# Patient Record
Sex: FEMALE | Race: WHITE | Employment: OTHER
[De-identification: names, ages, dates, MRNs, and addresses within clinical notes are randomized per-mention and may not be internally consistent; named-entity substitution may affect disease eponyms.]

---

## 2017-01-19 RX ORDER — ATORVASTATIN CALCIUM 10 MG/1
TABLET, FILM COATED ORAL
Qty: 90 TABLET | Refills: 1 | Status: SHIPPED | OUTPATIENT
Start: 2017-01-19 | End: 2017-07-18 | Stop reason: SDUPTHER

## 2017-02-01 ENCOUNTER — TELEPHONE (OUTPATIENT)
Dept: CASE MANAGEMENT | Age: 62
End: 2017-02-01

## 2017-03-01 ENCOUNTER — OFFICE VISIT (OUTPATIENT)
Dept: GYNECOLOGIC ONCOLOGY | Facility: CLINIC | Age: 62
End: 2017-03-01

## 2017-03-01 ENCOUNTER — TELEPHONE (OUTPATIENT)
Dept: CASE MANAGEMENT | Age: 62
End: 2017-03-01

## 2017-03-01 VITALS
WEIGHT: 166.2 LBS | HEART RATE: 85 BPM | SYSTOLIC BLOOD PRESSURE: 102 MMHG | OXYGEN SATURATION: 98 % | BODY MASS INDEX: 32.63 KG/M2 | DIASTOLIC BLOOD PRESSURE: 66 MMHG | TEMPERATURE: 97.8 F | HEIGHT: 60 IN | RESPIRATION RATE: 16 BRPM

## 2017-03-01 DIAGNOSIS — C54.1 ENDOMETRIAL CANCER (HCC): Primary | ICD-10-CM

## 2017-03-01 DIAGNOSIS — R91.1 PULMONARY NODULE SEEN ON IMAGING STUDY: ICD-10-CM

## 2017-03-01 PROCEDURE — 3014F SCREEN MAMMO DOC REV: CPT | Performed by: NURSE PRACTITIONER

## 2017-03-01 PROCEDURE — G8484 FLU IMMUNIZE NO ADMIN: HCPCS | Performed by: NURSE PRACTITIONER

## 2017-03-01 PROCEDURE — 1036F TOBACCO NON-USER: CPT | Performed by: NURSE PRACTITIONER

## 2017-03-01 PROCEDURE — G8427 DOCREV CUR MEDS BY ELIG CLIN: HCPCS | Performed by: NURSE PRACTITIONER

## 2017-03-01 PROCEDURE — 3017F COLORECTAL CA SCREEN DOC REV: CPT | Performed by: NURSE PRACTITIONER

## 2017-03-01 PROCEDURE — G8417 CALC BMI ABV UP PARAM F/U: HCPCS | Performed by: NURSE PRACTITIONER

## 2017-03-01 PROCEDURE — 99213 OFFICE O/P EST LOW 20 MIN: CPT | Performed by: NURSE PRACTITIONER

## 2017-03-01 ASSESSMENT — ENCOUNTER SYMPTOMS
CONSTIPATION: 0
ABDOMINAL PAIN: 0
ANAL BLEEDING: 0
COUGH: 0
BLOOD IN STOOL: 0
TROUBLE SWALLOWING: 0
ABDOMINAL DISTENTION: 0
SHORTNESS OF BREATH: 0

## 2017-04-03 ENCOUNTER — TELEPHONE (OUTPATIENT)
Dept: CASE MANAGEMENT | Age: 62
End: 2017-04-03

## 2017-04-18 ENCOUNTER — HOSPITAL ENCOUNTER (OUTPATIENT)
Dept: ULTRASOUND IMAGING | Age: 62
Discharge: HOME | End: 2017-04-18
Payer: COMMERCIAL

## 2017-04-18 DIAGNOSIS — D51.3 OTHER DIETARY VITAMIN B12 DEFICIENCY ANEMIA: ICD-10-CM

## 2017-04-18 DIAGNOSIS — Z79.899 POLYPHARMACY: ICD-10-CM

## 2017-04-18 DIAGNOSIS — R97.8 ABNORMAL TUMOR MARKERS: ICD-10-CM

## 2017-04-18 DIAGNOSIS — K90.89: ICD-10-CM

## 2017-04-18 DIAGNOSIS — C38.0: ICD-10-CM

## 2017-04-18 PROCEDURE — 76536 US EXAM OF HEAD AND NECK: CPT

## 2017-05-05 ENCOUNTER — HOSPITAL ENCOUNTER (OUTPATIENT)
Dept: ULTRASOUND IMAGING | Age: 62
Discharge: HOME | End: 2017-05-05
Payer: COMMERCIAL

## 2017-05-05 VITALS
HEART RATE: 78 BPM | RESPIRATION RATE: 13 BRPM | DIASTOLIC BLOOD PRESSURE: 81 MMHG | OXYGEN SATURATION: 96 % | SYSTOLIC BLOOD PRESSURE: 138 MMHG

## 2017-05-05 DIAGNOSIS — E04.1 THYROID NODULE: ICD-10-CM

## 2017-05-05 PROCEDURE — 88172 CYTP DX EVAL FNA 1ST EA SITE: CPT

## 2017-05-05 PROCEDURE — 88173 CYTOPATH EVAL FNA REPORT: CPT

## 2017-05-05 PROCEDURE — 60100 BIOPSY OF THYROID: CPT

## 2017-05-05 PROCEDURE — 88305 TISSUE EXAM BY PATHOLOGIST: CPT

## 2017-05-05 PROCEDURE — 88177 CYTP FNA EVAL EA ADDL: CPT

## 2017-05-08 LAB — SURGICAL PATHOLOGY REPORT: NORMAL

## 2017-06-08 ENCOUNTER — HOSPITAL ENCOUNTER (OUTPATIENT)
Age: 62
Setting detail: SPECIMEN
Discharge: HOME | End: 2017-06-08
Payer: COMMERCIAL

## 2017-06-08 ENCOUNTER — OFFICE VISIT (OUTPATIENT)
Dept: GYNECOLOGIC ONCOLOGY | Age: 62
End: 2017-06-08
Payer: COMMERCIAL

## 2017-06-08 VITALS
DIASTOLIC BLOOD PRESSURE: 84 MMHG | BODY MASS INDEX: 33.42 KG/M2 | HEIGHT: 61 IN | OXYGEN SATURATION: 97 % | WEIGHT: 177 LBS | TEMPERATURE: 98 F | SYSTOLIC BLOOD PRESSURE: 119 MMHG | RESPIRATION RATE: 18 BRPM | HEART RATE: 81 BPM

## 2017-06-08 DIAGNOSIS — Z12.31 ENCOUNTER FOR SCREENING MAMMOGRAM FOR BREAST CANCER: ICD-10-CM

## 2017-06-08 DIAGNOSIS — Z85.42 HISTORY OF CANCER OF UTERINE BODY: Primary | ICD-10-CM

## 2017-06-08 PROCEDURE — 3014F SCREEN MAMMO DOC REV: CPT | Performed by: OBSTETRICS & GYNECOLOGY

## 2017-06-08 PROCEDURE — 3017F COLORECTAL CA SCREEN DOC REV: CPT | Performed by: OBSTETRICS & GYNECOLOGY

## 2017-06-08 PROCEDURE — 1036F TOBACCO NON-USER: CPT | Performed by: OBSTETRICS & GYNECOLOGY

## 2017-06-08 PROCEDURE — G8417 CALC BMI ABV UP PARAM F/U: HCPCS | Performed by: OBSTETRICS & GYNECOLOGY

## 2017-06-08 PROCEDURE — 99213 OFFICE O/P EST LOW 20 MIN: CPT | Performed by: OBSTETRICS & GYNECOLOGY

## 2017-06-08 PROCEDURE — 57100 BIOPSY VAGINAL MUCOSA SIMPLE: CPT | Performed by: OBSTETRICS & GYNECOLOGY

## 2017-06-08 PROCEDURE — G8427 DOCREV CUR MEDS BY ELIG CLIN: HCPCS | Performed by: OBSTETRICS & GYNECOLOGY

## 2017-06-08 ASSESSMENT — ENCOUNTER SYMPTOMS
GASTROINTESTINAL NEGATIVE: 1
ABDOMINAL PAIN: 0
EYES NEGATIVE: 1
BLOOD IN STOOL: 0
ABDOMINAL DISTENTION: 0
SHORTNESS OF BREATH: 0
RESPIRATORY NEGATIVE: 1
ALLERGIC/IMMUNOLOGIC NEGATIVE: 1

## 2017-06-12 ENCOUNTER — TELEPHONE (OUTPATIENT)
Dept: GYNECOLOGIC ONCOLOGY | Age: 62
End: 2017-06-12

## 2017-06-12 LAB — SURGICAL PATHOLOGY REPORT: NORMAL

## 2017-07-11 ENCOUNTER — HOSPITAL ENCOUNTER (OUTPATIENT)
Dept: MAMMOGRAPHY | Age: 62
Discharge: HOME | End: 2017-07-11
Payer: COMMERCIAL

## 2017-07-11 DIAGNOSIS — Z85.42 HISTORY OF CANCER OF UTERINE BODY: ICD-10-CM

## 2017-07-11 DIAGNOSIS — Z12.31 ENCOUNTER FOR SCREENING MAMMOGRAM FOR BREAST CANCER: ICD-10-CM

## 2017-07-11 PROCEDURE — 77063 BREAST TOMOSYNTHESIS BI: CPT

## 2017-07-18 RX ORDER — ATORVASTATIN CALCIUM 10 MG/1
TABLET, FILM COATED ORAL
Qty: 90 TABLET | Refills: 0 | Status: SHIPPED | OUTPATIENT
Start: 2017-07-18 | End: 2017-10-17 | Stop reason: SDUPTHER

## 2017-10-02 RX ORDER — RANITIDINE 150 MG/1
TABLET ORAL
Qty: 180 TABLET | Refills: 1 | Status: SHIPPED | OUTPATIENT
Start: 2017-10-02 | End: 2018-03-06 | Stop reason: SDUPTHER

## 2017-10-02 RX ORDER — IBUPROFEN 800 MG/1
TABLET ORAL
Qty: 270 TABLET | Refills: 1 | Status: SHIPPED | OUTPATIENT
Start: 2017-10-02 | End: 2018-03-06 | Stop reason: SDUPTHER

## 2017-10-17 RX ORDER — ATORVASTATIN CALCIUM 10 MG/1
10 TABLET, FILM COATED ORAL DAILY
Qty: 90 TABLET | Refills: 3 | Status: SHIPPED | OUTPATIENT
Start: 2017-10-17 | End: 2018-03-06 | Stop reason: SDUPTHER

## 2017-11-10 ENCOUNTER — OFFICE VISIT (OUTPATIENT)
Dept: GYNECOLOGIC ONCOLOGY | Age: 62
End: 2017-11-10
Payer: COMMERCIAL

## 2017-11-10 VITALS
HEIGHT: 61 IN | BODY MASS INDEX: 35.61 KG/M2 | OXYGEN SATURATION: 96 % | SYSTOLIC BLOOD PRESSURE: 135 MMHG | HEART RATE: 79 BPM | DIASTOLIC BLOOD PRESSURE: 83 MMHG | WEIGHT: 188.6 LBS | TEMPERATURE: 97.3 F

## 2017-11-10 DIAGNOSIS — Z90.722 STATUS POST TOTAL HYSTERECTOMY AND BILATERAL SALPINGO-OOPHORECTOMY: ICD-10-CM

## 2017-11-10 DIAGNOSIS — Z08 ENCOUNTER FOR ROUTINE CANCER FOLLOW-UP: Primary | ICD-10-CM

## 2017-11-10 DIAGNOSIS — Z85.42 HISTORY OF CANCER OF UTERINE BODY: ICD-10-CM

## 2017-11-10 DIAGNOSIS — Z90.710 STATUS POST TOTAL HYSTERECTOMY AND BILATERAL SALPINGO-OOPHORECTOMY: ICD-10-CM

## 2017-11-10 DIAGNOSIS — R91.1 PULMONARY NODULE SEEN ON IMAGING STUDY: ICD-10-CM

## 2017-11-10 DIAGNOSIS — Z13.89 SCREENING FOR GENITOURINARY CONDITION: ICD-10-CM

## 2017-11-10 DIAGNOSIS — Z90.79 STATUS POST TOTAL HYSTERECTOMY AND BILATERAL SALPINGO-OOPHORECTOMY: ICD-10-CM

## 2017-11-10 PROCEDURE — 99213 OFFICE O/P EST LOW 20 MIN: CPT | Performed by: NURSE PRACTITIONER

## 2017-11-10 PROCEDURE — 3014F SCREEN MAMMO DOC REV: CPT | Performed by: NURSE PRACTITIONER

## 2017-11-10 PROCEDURE — G8427 DOCREV CUR MEDS BY ELIG CLIN: HCPCS | Performed by: NURSE PRACTITIONER

## 2017-11-10 PROCEDURE — 1036F TOBACCO NON-USER: CPT | Performed by: NURSE PRACTITIONER

## 2017-11-10 PROCEDURE — G8484 FLU IMMUNIZE NO ADMIN: HCPCS | Performed by: NURSE PRACTITIONER

## 2017-11-10 PROCEDURE — G8417 CALC BMI ABV UP PARAM F/U: HCPCS | Performed by: NURSE PRACTITIONER

## 2017-11-10 PROCEDURE — 3017F COLORECTAL CA SCREEN DOC REV: CPT | Performed by: NURSE PRACTITIONER

## 2017-11-10 ASSESSMENT — ENCOUNTER SYMPTOMS
CONSTIPATION: 0
SHORTNESS OF BREATH: 0
ABDOMINAL PAIN: 0
ABDOMINAL DISTENTION: 0
ANAL BLEEDING: 0
BLOOD IN STOOL: 0
TROUBLE SWALLOWING: 0
COUGH: 0

## 2017-11-10 NOTE — PROGRESS NOTES
Cheli Jarquin is a 57 y.o. female that presents today for:  No chief complaint on file. HPI:  HPI  57 y.o.   2 Para 2 woman, seen initially 10/6/16 at the request of Dr. Guero Brown for endometrial cancer. The patient developed postmenopausal bleeding which had persisted for 2 months. It started as a very light, sporadic blood tinged discharge that she noted when wiping after urination, ultimately she developed heavier bleeding. A pelvic ultrasound was obtained on 2016. The uterus measured 7.8 x 3.2 x 5.3 cm but the endometrial stripe was thickened at 2.5 cm. The ovaries were normal and there was no significant free fluid within the pelvis. An endometrial biopsy was obtained on 2016. This showed a high-grade mixed cell carcinoma comprised of 60% serous carcinoma and 40% clear cell carcinoma. A CT of the chest abdomen and pelvis was obtained on 2016. There was no evidence of metastatic disease in the chest, abdomen or pelvis. There was a nonspecific less than 4 mm left lower lobe pulmonary nodule and surveillance was recommended. She was counseled to undergo surgical staging.   On 10/19/16 she underwent a total laparoscopy hysterectomy, bilateral salpingo-oophorectomy,pelvic and para-aortic lymphadenectomy,omental biopsy, appendectomy, small bowel resection and extensive lysis of adhesions. 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      The patient had consultation with medical oncology and has completed six cycles of adjuvant chemotherapy. She  declined vaginal cuff brachytherapy. She has completed her adjuvant chemotherapy and underwent a CT of the chest, abdomen and pelvis on 2017.   Pulmonary and thyroid nodules were stable and there was no evidence of recurrent or persistent endometrial cancer.     An ultrasound guided biopsy of the thyroid nodule was children: N/A    Years of education: N/A     Social History Main Topics    Smoking status: Former Smoker     Types: Cigarettes     Quit date: 1/1/1991    Smokeless tobacco: Never Used    Alcohol use Yes      Comment: rare    Drug use: No    Sexual activity: Not on file     Other Topics Concern    Not on file     Social History Narrative    No narrative on file       Past [address] does contribute to today's presenting complaint. Current Outpatient Prescriptions   Medication Sig Dispense Refill    atorvastatin (LIPITOR) 10 MG tablet Take 1 tablet by mouth daily 90 tablet 3    ibuprofen (ADVIL;MOTRIN) 800 MG tablet TAKE 1 TABLET EVERY 8 HOURS AS NEEDED FOR PAIN 270 tablet 1    ranitidine (ZANTAC) 150 MG tablet TAKE 1 TABLET TWICE A  tablet 1     No current facility-administered medications for this visit. No Known Allergies    There were no vitals filed for this visit. Physical Examination:  Physical Exam   Constitutional: She is oriented to person, place, and time. She appears well-developed. No distress. HENT:   Head: Normocephalic and atraumatic. Cardiovascular: Normal rate and regular rhythm. No murmur heard. Pulmonary/Chest: Effort normal and breath sounds normal. She has no wheezes. Abdominal: Soft. Bowel sounds are normal. She exhibits no distension. There is no tenderness. Genitourinary:   Genitourinary Comments: Ovaries, tubes, uterus, cervix surgically removed, no palpable vaginal nodules, pelvic masses or tenderness, vaginal cuff is intact without erythema, induration, separation or granulation tissue, rectal-vaginal exam is unremarkable, the pt has normal female genitalia including vulva, urethra,vagina, Bartholin's and Goss's glands   Musculoskeletal: She exhibits no edema or deformity. Neurological: She is alert and oriented to person, place, and time. Skin: Skin is warm and dry. No rash noted. No erythema. Psychiatric: She has a normal mood and affect.  Her behavior is normal.         Assessment:     1. Encounter for routine cancer follow-up     2. History of cancer of uterine body     3. Pulmonary nodule seen on imaging study     4. Status post total hysterectomy and bilateral salpingo-oophorectomy            Plan:    No evidence of recurrent or persistent endometrial cancer found on exam, pt to continue with routine surveillance follow ups every 3-4 months. Repeat low dose Ct of the chest to monitor lung nodule before next follow up. The pt denies any unanswered questions and voices understanding and agreement with Plan of Care. Return in about 3 months (around 2/10/2018) for routine surveillance. No orders of the defined types were placed in this encounter. No orders of the defined types were placed in this encounter.          Electronically signed by Ki Haynes CNP on 11/10/2017 at 1:04 PM

## 2018-03-06 ENCOUNTER — OFFICE VISIT (OUTPATIENT)
Dept: FAMILY MEDICINE CLINIC | Age: 63
End: 2018-03-06
Payer: MEDICARE

## 2018-03-06 VITALS
HEIGHT: 61 IN | WEIGHT: 182 LBS | HEART RATE: 76 BPM | BODY MASS INDEX: 34.36 KG/M2 | SYSTOLIC BLOOD PRESSURE: 132 MMHG | DIASTOLIC BLOOD PRESSURE: 70 MMHG

## 2018-03-06 DIAGNOSIS — E78.00 PURE HYPERCHOLESTEROLEMIA: ICD-10-CM

## 2018-03-06 DIAGNOSIS — I10 ESSENTIAL HYPERTENSION: Primary | ICD-10-CM

## 2018-03-06 DIAGNOSIS — R55 NEAR SYNCOPE: ICD-10-CM

## 2018-03-06 DIAGNOSIS — M19.90 OSTEOARTHRITIS, UNSPECIFIED OSTEOARTHRITIS TYPE, UNSPECIFIED SITE: ICD-10-CM

## 2018-03-06 PROCEDURE — 1036F TOBACCO NON-USER: CPT | Performed by: FAMILY MEDICINE

## 2018-03-06 PROCEDURE — 4040F PNEUMOC VAC/ADMIN/RCVD: CPT | Performed by: FAMILY MEDICINE

## 2018-03-06 PROCEDURE — G8417 CALC BMI ABV UP PARAM F/U: HCPCS | Performed by: FAMILY MEDICINE

## 2018-03-06 PROCEDURE — 1123F ACP DISCUSS/DSCN MKR DOCD: CPT | Performed by: FAMILY MEDICINE

## 2018-03-06 PROCEDURE — G8400 PT W/DXA NO RESULTS DOC: HCPCS | Performed by: FAMILY MEDICINE

## 2018-03-06 PROCEDURE — 99213 OFFICE O/P EST LOW 20 MIN: CPT | Performed by: FAMILY MEDICINE

## 2018-03-06 PROCEDURE — 1090F PRES/ABSN URINE INCON ASSESS: CPT | Performed by: FAMILY MEDICINE

## 2018-03-06 PROCEDURE — G8484 FLU IMMUNIZE NO ADMIN: HCPCS | Performed by: FAMILY MEDICINE

## 2018-03-06 PROCEDURE — 3017F COLORECTAL CA SCREEN DOC REV: CPT | Performed by: FAMILY MEDICINE

## 2018-03-06 PROCEDURE — 3014F SCREEN MAMMO DOC REV: CPT | Performed by: FAMILY MEDICINE

## 2018-03-06 PROCEDURE — G8427 DOCREV CUR MEDS BY ELIG CLIN: HCPCS | Performed by: FAMILY MEDICINE

## 2018-03-06 RX ORDER — RANITIDINE 150 MG/1
TABLET ORAL
Qty: 180 TABLET | Refills: 3 | Status: SHIPPED | OUTPATIENT
Start: 2018-03-06 | End: 2019-02-07 | Stop reason: SDUPTHER

## 2018-03-06 RX ORDER — ATORVASTATIN CALCIUM 10 MG/1
10 TABLET, FILM COATED ORAL DAILY
Qty: 90 TABLET | Refills: 3 | Status: SHIPPED | OUTPATIENT
Start: 2018-03-06 | End: 2019-02-07 | Stop reason: SDUPTHER

## 2018-03-06 RX ORDER — IBUPROFEN 800 MG/1
TABLET ORAL
Qty: 270 TABLET | Refills: 3 | Status: SHIPPED | OUTPATIENT
Start: 2018-03-06 | End: 2019-02-07 | Stop reason: SDUPTHER

## 2018-03-06 ASSESSMENT — ENCOUNTER SYMPTOMS
SORE THROAT: 0
NAUSEA: 0
SHORTNESS OF BREATH: 0
SINUS PRESSURE: 0
COUGH: 0
VOMITING: 0
DIARRHEA: 0
BACK PAIN: 1

## 2018-03-06 ASSESSMENT — PATIENT HEALTH QUESTIONNAIRE - PHQ9
2. FEELING DOWN, DEPRESSED OR HOPELESS: 0
SUM OF ALL RESPONSES TO PHQ QUESTIONS 1-9: 0
SUM OF ALL RESPONSES TO PHQ9 QUESTIONS 1 & 2: 0
1. LITTLE INTEREST OR PLEASURE IN DOING THINGS: 0

## 2018-03-06 NOTE — PROGRESS NOTES
colonoscopy  02/01/2021    Lipid screen  03/29/2021    DTaP/Tdap/Td vaccine (2 - Td) 03/29/2026       Past Medical History:   Diagnosis Date    Hyperlipidemia     Hypertension     Umbilical hernia       Past Surgical History:   Procedure Laterality Date    ABDOMINAL EXPLORATION SURGERY  10/19/2016    small bowel resection    APPENDECTOMY  10/19/2016    [facility]    ENDOMETRIAL BIOPSY  09/12/146    high grade carcinoma-ref to     HYSTERECTOMY      SMALL INTESTINE SURGERY      DAWSON AND BSO  10/19/2016    with pelvic and aorta lymph node dissection, omenectomy    TONSILLECTOMY      UMBILICAL HERNIA REPAIR  2010    UPPER GASTROINTESTINAL ENDOSCOPY  2011     Family History   Problem Relation Age of Onset    Alzheimer's Disease Mother     Dementia Father     Alcohol Abuse Father     Alcohol Abuse Paternal Grandfather      Social History   Substance Use Topics    Smoking status: Former Smoker     Types: Cigarettes     Quit date: 1/1/1991    Smokeless tobacco: Never Used    Alcohol use Yes      Comment: rare      Current Outpatient Prescriptions   Medication Sig Dispense Refill    atorvastatin (LIPITOR) 10 MG tablet Take 1 tablet by mouth daily 90 tablet 3    ibuprofen (ADVIL;MOTRIN) 800 MG tablet TAKE 1 TABLET EVERY 8 HOURS AS NEEDED FOR PAIN 270 tablet 3    ranitidine (ZANTAC) 150 MG tablet TAKE 1 TABLET TWICE A  tablet 3    Cyanocobalamin (B-12 COMPLIANCE INJECTION IJ) Inject as directed       No current facility-administered medications for this visit. No Known Allergies      Subjective:   Review of Systems   Constitutional: Positive for fatigue and unexpected weight change. Negative for chills, diaphoresis and fever. HENT: Negative for congestion, sinus pressure and sore throat. Eyes: Negative for visual disturbance. Respiratory: Negative for cough and shortness of breath. Cardiovascular: Negative for chest pain and leg swelling. improve.     Orders Placed This Encounter   Procedures    XR FINGER LEFT (MIN 2 VIEWS)     Standing Status:   Future     Standing Expiration Date:   3/6/2019     Order Specific Question:   Reason for exam:     Answer:   Thumb MP joint pain    CBC Auto Differential     Standing Status:   Future     Standing Expiration Date:   3/6/2019    Comprehensive Metabolic Panel     Standing Status:   Future     Standing Expiration Date:   3/6/2019    Lipid Panel     Standing Status:   Future     Standing Expiration Date:   3/6/2019     Order Specific Question:   Is Patient Fasting?/# of Hours     Answer:   12 hour fast    Magnesium     Standing Status:   Future     Standing Expiration Date:   3/6/2019    External Referral To Hand Surgery     Referral Priority:   Routine     Referral Type:   Consult for Advice and Opinion     Referral Reason:   Specialty Services Required     Requested Specialty:   Hand Surgery     Number of Visits Requested:   1     Orders Placed This Encounter   Medications    atorvastatin (LIPITOR) 10 MG tablet     Sig: Take 1 tablet by mouth daily     Dispense:  90 tablet     Refill:  3    ibuprofen (ADVIL;MOTRIN) 800 MG tablet     Sig: TAKE 1 TABLET EVERY 8 HOURS AS NEEDED FOR PAIN     Dispense:  270 tablet     Refill:  3    ranitidine (ZANTAC) 150 MG tablet     Sig: TAKE 1 TABLET TWICE A DAY     Dispense:  180 tablet     Refill:  3

## 2018-03-08 ENCOUNTER — TELEPHONE (OUTPATIENT)
Dept: GYNECOLOGIC ONCOLOGY | Age: 63
End: 2018-03-08

## 2018-03-09 ENCOUNTER — OFFICE VISIT (OUTPATIENT)
Dept: GYNECOLOGIC ONCOLOGY | Age: 63
End: 2018-03-09
Payer: MEDICARE

## 2018-03-09 VITALS
HEIGHT: 61 IN | OXYGEN SATURATION: 100 % | WEIGHT: 182.4 LBS | BODY MASS INDEX: 34.44 KG/M2 | SYSTOLIC BLOOD PRESSURE: 129 MMHG | TEMPERATURE: 97.2 F | HEART RATE: 90 BPM | DIASTOLIC BLOOD PRESSURE: 90 MMHG

## 2018-03-09 DIAGNOSIS — Z90.710 S/P TOTAL HYSTERECTOMY AND BILATERAL SALPINGO-OOPHORECTOMY: Primary | ICD-10-CM

## 2018-03-09 DIAGNOSIS — Z08 ENCOUNTER FOR ROUTINE CANCER FOLLOW-UP: ICD-10-CM

## 2018-03-09 DIAGNOSIS — Z90.722 S/P TOTAL HYSTERECTOMY AND BILATERAL SALPINGO-OOPHORECTOMY: Primary | ICD-10-CM

## 2018-03-09 DIAGNOSIS — Z90.79 S/P TOTAL HYSTERECTOMY AND BILATERAL SALPINGO-OOPHORECTOMY: Primary | ICD-10-CM

## 2018-03-09 DIAGNOSIS — Z85.42 PERSONAL HISTORY OF MALIGNANT NEOPLASM OF UTERUS: ICD-10-CM

## 2018-03-09 PROCEDURE — 1036F TOBACCO NON-USER: CPT | Performed by: NURSE PRACTITIONER

## 2018-03-09 PROCEDURE — G8427 DOCREV CUR MEDS BY ELIG CLIN: HCPCS | Performed by: NURSE PRACTITIONER

## 2018-03-09 PROCEDURE — 4040F PNEUMOC VAC/ADMIN/RCVD: CPT | Performed by: NURSE PRACTITIONER

## 2018-03-09 PROCEDURE — 3014F SCREEN MAMMO DOC REV: CPT | Performed by: NURSE PRACTITIONER

## 2018-03-09 PROCEDURE — G8484 FLU IMMUNIZE NO ADMIN: HCPCS | Performed by: NURSE PRACTITIONER

## 2018-03-09 PROCEDURE — 1090F PRES/ABSN URINE INCON ASSESS: CPT | Performed by: NURSE PRACTITIONER

## 2018-03-09 PROCEDURE — 3017F COLORECTAL CA SCREEN DOC REV: CPT | Performed by: NURSE PRACTITIONER

## 2018-03-09 PROCEDURE — G8400 PT W/DXA NO RESULTS DOC: HCPCS | Performed by: NURSE PRACTITIONER

## 2018-03-09 PROCEDURE — G8417 CALC BMI ABV UP PARAM F/U: HCPCS | Performed by: NURSE PRACTITIONER

## 2018-03-09 PROCEDURE — 99213 OFFICE O/P EST LOW 20 MIN: CPT | Performed by: NURSE PRACTITIONER

## 2018-03-09 PROCEDURE — 1123F ACP DISCUSS/DSCN MKR DOCD: CPT | Performed by: NURSE PRACTITIONER

## 2018-03-09 ASSESSMENT — ENCOUNTER SYMPTOMS
TROUBLE SWALLOWING: 0
SHORTNESS OF BREATH: 0
COUGH: 0
BLOOD IN STOOL: 0
ABDOMINAL PAIN: 0
ANAL BLEEDING: 0
ABDOMINAL DISTENTION: 0
CONSTIPATION: 0

## 2018-03-09 NOTE — PROGRESS NOTES
Bernardo Pérez is a 70 y.o. female that presents today for:    Chief Complaint   Patient presents with    Follow-up     endometrial cancer surveillance       HPI:  HPI  70 y.o.   2 Para 2 woman, seen initially 10/6/16 at the request of Dr. Lynda Aguilar for endometrial cancer. The patient developed postmenopausal bleeding which had persisted for 2 months. It started as a very light, sporadic blood tinged discharge that she noted when wiping after urination, ultimately she developed heavier bleeding. A pelvic ultrasound was obtained on 2016. The uterus measured 7.8 x 3.2 x 5.3 cm but the endometrial stripe was thickened at 2.5 cm. The ovaries were normal and there was no significant free fluid within the pelvis. An endometrial biopsy was obtained on 2016. This showed a high-grade mixed cell carcinoma comprised of 60% serous carcinoma and 40% clear cell carcinoma. A CT of the chest abdomen and pelvis was obtained on 2016. There was no evidence of metastatic disease in the chest, abdomen or pelvis. There was a nonspecific less than 4 mm left lower lobe pulmonary nodule and surveillance was recommended. She was counseled to undergo surgical staging.     On 10/19/16 she underwent a total laparoscopy hysterectomy, bilateral salpingo-oophorectomy,pelvic and para-aortic lymphadenectomy,omental biopsy, appendectomy, small bowel resection and extensive lysis of adhesions. 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      The patient had consultation with medical oncology and has completed six cycles of adjuvant chemotherapy. She  declined vaginal cuff brachytherapy.  She has completed her adjuvant chemotherapy and underwent a CT of the chest, abdomen and pelvis on 2017.  Pulmonary and thyroid nodules were stable and there was no evidence of recurrent or persistent endometrial cancer.     An ultrasound guided biopsy of the thyroid nodule was performed on May 5, 2017.  This was benign, consistent with a benign follicular nodule.      Her last mammogram was in 7/11/17 and was negative her last colonoscopy was in 2011 and was negative.     She had a repeat Ct of the chest 12/05/17 and  That showed no changes. She was last seen 11/10/17 and is here today for a routine surveillance follow up. She reports 2 episode of light headiness and difficulty walking recently. She has already met with Dr. Laureano Salvador and is getting bloodwork done. She reports he believes she is having episodes of low BP. She has no GYN concerns or complaints. ROS:  Review of Systems   Constitutional: Negative for chills, fatigue and fever. HENT: Negative for congestion and trouble swallowing. Respiratory: Negative for cough and shortness of breath. Cardiovascular: Negative for chest pain and leg swelling. Gastrointestinal: Negative for abdominal distention, abdominal pain, anal bleeding, blood in stool and constipation. Genitourinary: Negative for difficulty urinating, dysuria, urgency, vaginal bleeding and vaginal discharge. Neurological: Positive for light-headedness (2 recent episodes). Negative for weakness. Psychiatric/Behavioral: Negative for confusion and dysphoric mood.        Past Medical History:   Diagnosis Date    Hyperlipidemia     Hypertension     Umbilical hernia        Past Surgical History:   Procedure Laterality Date    ABDOMINAL EXPLORATION SURGERY  10/19/2016    small bowel resection    APPENDECTOMY  10/19/2016    COLONOSCOPY  2011    [facility] ENDOMETRIAL BIOPSY  09/12/146    high grade carcinoma-ref to     HYSTERECTOMY      SMALL INTESTINE SURGERY      DAWSON AND BSO  10/19/2016    with pelvic and aorta lymph node dissection, omenectomy    TONSILLECTOMY      UMBILICAL HERNIA REPAIR  2010    UPPER GASTROINTESTINAL ENDOSCOPY  2011       Family History

## 2018-03-13 ENCOUNTER — HOSPITAL ENCOUNTER (OUTPATIENT)
Dept: GENERAL RADIOLOGY | Age: 63
Discharge: HOME | End: 2018-03-15
Payer: MEDICARE

## 2018-03-13 ENCOUNTER — HOSPITAL ENCOUNTER (OUTPATIENT)
Age: 63
Discharge: HOME | End: 2018-03-13
Payer: MEDICARE

## 2018-03-13 ENCOUNTER — HOSPITAL ENCOUNTER (OUTPATIENT)
Age: 63
Discharge: HOME | End: 2018-03-15
Payer: MEDICARE

## 2018-03-13 DIAGNOSIS — M19.90 OSTEOARTHRITIS, UNSPECIFIED OSTEOARTHRITIS TYPE, UNSPECIFIED SITE: ICD-10-CM

## 2018-03-13 DIAGNOSIS — I10 ESSENTIAL HYPERTENSION: ICD-10-CM

## 2018-03-13 DIAGNOSIS — E78.00 PURE HYPERCHOLESTEROLEMIA: ICD-10-CM

## 2018-03-13 DIAGNOSIS — R55 NEAR SYNCOPE: ICD-10-CM

## 2018-03-13 LAB
ABSOLUTE EOS #: 0.1 K/UL (ref 0–0.4)
ABSOLUTE IMMATURE GRANULOCYTE: ABNORMAL K/UL (ref 0–0.3)
ABSOLUTE LYMPH #: 1.9 K/UL (ref 1–4.8)
ABSOLUTE MONO #: 0.5 K/UL (ref 0.2–0.8)
ALBUMIN SERPL-MCNC: 4.2 G/DL (ref 3.5–5.2)
ALBUMIN/GLOBULIN RATIO: ABNORMAL (ref 1–2.5)
ALP BLD-CCNC: 30 U/L (ref 35–104)
ALT SERPL-CCNC: 15 U/L (ref 5–33)
ANION GAP SERPL CALCULATED.3IONS-SCNC: 10 MMOL/L (ref 9–17)
AST SERPL-CCNC: 17 U/L
BASOPHILS # BLD: 1 % (ref 0–2)
BASOPHILS ABSOLUTE: 0 K/UL (ref 0–0.2)
BILIRUB SERPL-MCNC: 0.36 MG/DL (ref 0.3–1.2)
BUN BLDV-MCNC: 27 MG/DL (ref 8–23)
BUN/CREAT BLD: 40 (ref 9–20)
CALCIUM SERPL-MCNC: 9.3 MG/DL (ref 8.6–10.4)
CHLORIDE BLD-SCNC: 103 MMOL/L (ref 98–107)
CHOLESTEROL/HDL RATIO: 3.1
CHOLESTEROL: 184 MG/DL
CO2: 29 MMOL/L (ref 20–31)
CREAT SERPL-MCNC: 0.68 MG/DL (ref 0.5–0.9)
DIFFERENTIAL TYPE: ABNORMAL
EOSINOPHILS RELATIVE PERCENT: 1 % (ref 1–4)
GFR AFRICAN AMERICAN: >60 ML/MIN
GFR NON-AFRICAN AMERICAN: >60 ML/MIN
GFR SERPL CREATININE-BSD FRML MDRD: ABNORMAL ML/MIN/{1.73_M2}
GFR SERPL CREATININE-BSD FRML MDRD: ABNORMAL ML/MIN/{1.73_M2}
GLUCOSE BLD-MCNC: 85 MG/DL (ref 70–99)
HCT VFR BLD CALC: 42.8 % (ref 36–46)
HDLC SERPL-MCNC: 59 MG/DL
HEMOGLOBIN: 14.1 G/DL (ref 12–16)
IMMATURE GRANULOCYTES: ABNORMAL %
LDL CHOLESTEROL: 94 MG/DL (ref 0–130)
LYMPHOCYTES # BLD: 35 % (ref 24–44)
MAGNESIUM: 2.2 MG/DL (ref 1.6–2.6)
MCH RBC QN AUTO: 30.8 PG (ref 26–34)
MCHC RBC AUTO-ENTMCNC: 32.9 G/DL (ref 31–37)
MCV RBC AUTO: 93.7 FL (ref 80–100)
MONOCYTES # BLD: 9 % (ref 1–7)
NRBC AUTOMATED: ABNORMAL PER 100 WBC
PDW BLD-RTO: 14.6 % (ref 11.5–14.5)
PLATELET # BLD: 244 K/UL (ref 130–400)
PLATELET ESTIMATE: ABNORMAL
PMV BLD AUTO: 8.1 FL (ref 6–12)
POTASSIUM SERPL-SCNC: 4.6 MMOL/L (ref 3.7–5.3)
RBC # BLD: 4.57 M/UL (ref 4–5.2)
RBC # BLD: ABNORMAL 10*6/UL
SEG NEUTROPHILS: 54 % (ref 36–66)
SEGMENTED NEUTROPHILS ABSOLUTE COUNT: 2.9 K/UL (ref 1.8–7.7)
SODIUM BLD-SCNC: 142 MMOL/L (ref 135–144)
TOTAL PROTEIN: 6.7 G/DL (ref 6.4–8.3)
TRIGL SERPL-MCNC: 154 MG/DL
VLDLC SERPL CALC-MCNC: ABNORMAL MG/DL (ref 1–30)
WBC # BLD: 5.4 K/UL (ref 3.5–11)
WBC # BLD: ABNORMAL 10*3/UL

## 2018-03-13 PROCEDURE — 73140 X-RAY EXAM OF FINGER(S): CPT

## 2018-03-13 PROCEDURE — 83735 ASSAY OF MAGNESIUM: CPT

## 2018-03-13 PROCEDURE — 80053 COMPREHEN METABOLIC PANEL: CPT

## 2018-03-13 PROCEDURE — 85025 COMPLETE CBC W/AUTO DIFF WBC: CPT

## 2018-03-13 PROCEDURE — 36415 COLL VENOUS BLD VENIPUNCTURE: CPT

## 2018-03-13 PROCEDURE — 80061 LIPID PANEL: CPT

## 2018-07-10 ENCOUNTER — OFFICE VISIT (OUTPATIENT)
Dept: GYNECOLOGIC ONCOLOGY | Age: 63
End: 2018-07-10
Payer: MEDICARE

## 2018-07-10 VITALS
DIASTOLIC BLOOD PRESSURE: 77 MMHG | SYSTOLIC BLOOD PRESSURE: 125 MMHG | HEIGHT: 60 IN | TEMPERATURE: 97.3 F | BODY MASS INDEX: 35.81 KG/M2 | OXYGEN SATURATION: 97 % | HEART RATE: 76 BPM | WEIGHT: 182.4 LBS

## 2018-07-10 DIAGNOSIS — Z12.31 ENCOUNTER FOR SCREENING MAMMOGRAM FOR BREAST CANCER: ICD-10-CM

## 2018-07-10 DIAGNOSIS — Z85.42 PERSONAL HISTORY OF MALIGNANT NEOPLASM OF UTERUS: ICD-10-CM

## 2018-07-10 DIAGNOSIS — Z90.722 S/P TOTAL HYSTERECTOMY AND BILATERAL SALPINGO-OOPHORECTOMY: ICD-10-CM

## 2018-07-10 DIAGNOSIS — Z08 ENCOUNTER FOR ROUTINE CANCER FOLLOW-UP: Primary | ICD-10-CM

## 2018-07-10 DIAGNOSIS — Z90.79 S/P TOTAL HYSTERECTOMY AND BILATERAL SALPINGO-OOPHORECTOMY: ICD-10-CM

## 2018-07-10 DIAGNOSIS — Z87.42 HISTORY OF ABNORMAL CERVICAL PAP SMEAR: ICD-10-CM

## 2018-07-10 DIAGNOSIS — Z90.710 S/P TOTAL HYSTERECTOMY AND BILATERAL SALPINGO-OOPHORECTOMY: ICD-10-CM

## 2018-07-10 PROCEDURE — 1036F TOBACCO NON-USER: CPT | Performed by: NURSE PRACTITIONER

## 2018-07-10 PROCEDURE — 99213 OFFICE O/P EST LOW 20 MIN: CPT | Performed by: NURSE PRACTITIONER

## 2018-07-10 PROCEDURE — 4040F PNEUMOC VAC/ADMIN/RCVD: CPT | Performed by: NURSE PRACTITIONER

## 2018-07-10 PROCEDURE — 1123F ACP DISCUSS/DSCN MKR DOCD: CPT | Performed by: NURSE PRACTITIONER

## 2018-07-10 PROCEDURE — 3017F COLORECTAL CA SCREEN DOC REV: CPT | Performed by: NURSE PRACTITIONER

## 2018-07-10 PROCEDURE — G8400 PT W/DXA NO RESULTS DOC: HCPCS | Performed by: NURSE PRACTITIONER

## 2018-07-10 PROCEDURE — G8417 CALC BMI ABV UP PARAM F/U: HCPCS | Performed by: NURSE PRACTITIONER

## 2018-07-10 PROCEDURE — 1090F PRES/ABSN URINE INCON ASSESS: CPT | Performed by: NURSE PRACTITIONER

## 2018-07-10 PROCEDURE — G8427 DOCREV CUR MEDS BY ELIG CLIN: HCPCS | Performed by: NURSE PRACTITIONER

## 2018-07-10 RX ORDER — LANOLIN ALCOHOL/MO/W.PET/CERES
1000 CREAM (GRAM) TOPICAL DAILY
COMMUNITY

## 2018-07-10 RX ORDER — M-VIT,TX,IRON,MINS/CALC/FOLIC 27MG-0.4MG
1 TABLET ORAL DAILY
COMMUNITY
End: 2021-10-21

## 2018-07-10 ASSESSMENT — ENCOUNTER SYMPTOMS
ABDOMINAL PAIN: 0
CONSTIPATION: 0
ABDOMINAL DISTENTION: 0
COUGH: 0
ANAL BLEEDING: 0
TROUBLE SWALLOWING: 0
SHORTNESS OF BREATH: 0
BLOOD IN STOOL: 0

## 2018-07-10 NOTE — PROGRESS NOTES
2010    UPPER GASTROINTESTINAL ENDOSCOPY  2011       Family History   Problem Relation Age of Onset    Alzheimer's Disease Mother     Dementia Father     Alcohol Abuse Father     Alcohol Abuse Paternal Grandfather        Social History     Social History    Marital status:      Spouse name: N/A    Number of children: N/A    Years of education: N/A     Social History Main Topics    Smoking status: Former Smoker     Types: Cigarettes     Quit date: 1/1/1991    Smokeless tobacco: Never Used    Alcohol use Yes      Comment: rare    Drug use: No    Sexual activity: Not Asked     Other Topics Concern    None     Social History Narrative    None       Past [address] does contribute to today's presenting complaint. Current Outpatient Prescriptions   Medication Sig Dispense Refill    Multiple Vitamins-Minerals (THERAPEUTIC MULTIVITAMIN-MINERALS) tablet Take 1 tablet by mouth daily      vitamin B-12 (CYANOCOBALAMIN) 1000 MCG tablet Take 1,000 mcg by mouth daily      atorvastatin (LIPITOR) 10 MG tablet Take 1 tablet by mouth daily 90 tablet 3    ibuprofen (ADVIL;MOTRIN) 800 MG tablet TAKE 1 TABLET EVERY 8 HOURS AS NEEDED FOR PAIN 270 tablet 3    ranitidine (ZANTAC) 150 MG tablet TAKE 1 TABLET TWICE A DAY (Patient taking differently: nightly TAKE 1 TABLET TWICE A DAY) 180 tablet 3     No current facility-administered medications for this visit. No Known Allergies    Vitals:    07/10/18 1049   BP: 125/77   Pulse: 76   Temp: 97.3 °F (36.3 °C)   TempSrc: Oral   SpO2: 97%   Weight: 182 lb 6.4 oz (82.7 kg)   Height: 5' (1.524 m)       Physical Examination:  Physical Exam   Constitutional: She is oriented to person, place, and time. She appears well-developed. No distress. HENT:   Head: Normocephalic and atraumatic. Cardiovascular: Normal rate and regular rhythm. No murmur heard. Pulmonary/Chest: Effort normal and breath sounds normal. She has no wheezes. Abdominal: Soft.  Bowel sounds are

## 2018-07-30 ENCOUNTER — HOSPITAL ENCOUNTER (OUTPATIENT)
Dept: MAMMOGRAPHY | Age: 63
Discharge: HOME | End: 2018-08-01
Payer: MEDICARE

## 2018-07-30 DIAGNOSIS — Z12.31 ENCOUNTER FOR SCREENING MAMMOGRAM FOR BREAST CANCER: ICD-10-CM

## 2018-07-30 PROCEDURE — 77063 BREAST TOMOSYNTHESIS BI: CPT

## 2019-01-04 ENCOUNTER — OFFICE VISIT (OUTPATIENT)
Dept: GYNECOLOGIC ONCOLOGY | Age: 64
End: 2019-01-04
Payer: MEDICARE

## 2019-01-04 VITALS
DIASTOLIC BLOOD PRESSURE: 101 MMHG | HEART RATE: 99 BPM | SYSTOLIC BLOOD PRESSURE: 188 MMHG | WEIGHT: 182.6 LBS | TEMPERATURE: 97.1 F | HEIGHT: 60 IN | BODY MASS INDEX: 35.85 KG/M2

## 2019-01-04 DIAGNOSIS — Z85.42 PERSONAL HISTORY OF MALIGNANT NEOPLASM OF UTERUS: Primary | ICD-10-CM

## 2019-01-04 PROCEDURE — G8417 CALC BMI ABV UP PARAM F/U: HCPCS | Performed by: OBSTETRICS & GYNECOLOGY

## 2019-01-04 PROCEDURE — G8484 FLU IMMUNIZE NO ADMIN: HCPCS | Performed by: OBSTETRICS & GYNECOLOGY

## 2019-01-04 PROCEDURE — 99214 OFFICE O/P EST MOD 30 MIN: CPT | Performed by: OBSTETRICS & GYNECOLOGY

## 2019-01-04 PROCEDURE — 4040F PNEUMOC VAC/ADMIN/RCVD: CPT | Performed by: OBSTETRICS & GYNECOLOGY

## 2019-01-04 PROCEDURE — 1123F ACP DISCUSS/DSCN MKR DOCD: CPT | Performed by: OBSTETRICS & GYNECOLOGY

## 2019-01-04 PROCEDURE — 1090F PRES/ABSN URINE INCON ASSESS: CPT | Performed by: OBSTETRICS & GYNECOLOGY

## 2019-01-04 PROCEDURE — G8400 PT W/DXA NO RESULTS DOC: HCPCS | Performed by: OBSTETRICS & GYNECOLOGY

## 2019-01-04 PROCEDURE — 3017F COLORECTAL CA SCREEN DOC REV: CPT | Performed by: OBSTETRICS & GYNECOLOGY

## 2019-01-04 PROCEDURE — G8427 DOCREV CUR MEDS BY ELIG CLIN: HCPCS | Performed by: OBSTETRICS & GYNECOLOGY

## 2019-01-04 PROCEDURE — 1101F PT FALLS ASSESS-DOCD LE1/YR: CPT | Performed by: OBSTETRICS & GYNECOLOGY

## 2019-01-04 PROCEDURE — 1036F TOBACCO NON-USER: CPT | Performed by: OBSTETRICS & GYNECOLOGY

## 2019-01-04 ASSESSMENT — ENCOUNTER SYMPTOMS
EYES NEGATIVE: 1
COUGH: 1
GASTROINTESTINAL NEGATIVE: 1

## 2019-02-07 ENCOUNTER — OFFICE VISIT (OUTPATIENT)
Dept: FAMILY MEDICINE CLINIC | Age: 64
End: 2019-02-07
Payer: MEDICARE

## 2019-02-07 VITALS
RESPIRATION RATE: 12 BRPM | HEART RATE: 76 BPM | WEIGHT: 185 LBS | BODY MASS INDEX: 36.13 KG/M2 | DIASTOLIC BLOOD PRESSURE: 88 MMHG | SYSTOLIC BLOOD PRESSURE: 160 MMHG

## 2019-02-07 DIAGNOSIS — E78.00 PURE HYPERCHOLESTEROLEMIA: ICD-10-CM

## 2019-02-07 DIAGNOSIS — I10 ESSENTIAL HYPERTENSION: Primary | ICD-10-CM

## 2019-02-07 DIAGNOSIS — H61.21 IMPACTED CERUMEN OF RIGHT EAR: ICD-10-CM

## 2019-02-07 PROCEDURE — G8417 CALC BMI ABV UP PARAM F/U: HCPCS | Performed by: FAMILY MEDICINE

## 2019-02-07 PROCEDURE — 1090F PRES/ABSN URINE INCON ASSESS: CPT | Performed by: FAMILY MEDICINE

## 2019-02-07 PROCEDURE — 1036F TOBACCO NON-USER: CPT | Performed by: FAMILY MEDICINE

## 2019-02-07 PROCEDURE — 3017F COLORECTAL CA SCREEN DOC REV: CPT | Performed by: FAMILY MEDICINE

## 2019-02-07 PROCEDURE — 99213 OFFICE O/P EST LOW 20 MIN: CPT | Performed by: FAMILY MEDICINE

## 2019-02-07 PROCEDURE — 1101F PT FALLS ASSESS-DOCD LE1/YR: CPT | Performed by: FAMILY MEDICINE

## 2019-02-07 PROCEDURE — G8428 CUR MEDS NOT DOCUMENT: HCPCS | Performed by: FAMILY MEDICINE

## 2019-02-07 PROCEDURE — G8400 PT W/DXA NO RESULTS DOC: HCPCS | Performed by: FAMILY MEDICINE

## 2019-02-07 PROCEDURE — G8484 FLU IMMUNIZE NO ADMIN: HCPCS | Performed by: FAMILY MEDICINE

## 2019-02-07 PROCEDURE — 4040F PNEUMOC VAC/ADMIN/RCVD: CPT | Performed by: FAMILY MEDICINE

## 2019-02-07 PROCEDURE — 1123F ACP DISCUSS/DSCN MKR DOCD: CPT | Performed by: FAMILY MEDICINE

## 2019-02-07 RX ORDER — RANITIDINE 150 MG/1
TABLET ORAL
Qty: 180 TABLET | Refills: 3 | Status: SHIPPED | OUTPATIENT
Start: 2019-02-07 | End: 2021-10-21

## 2019-02-07 RX ORDER — ATORVASTATIN CALCIUM 10 MG/1
10 TABLET, FILM COATED ORAL DAILY
Qty: 90 TABLET | Refills: 3 | Status: SHIPPED | OUTPATIENT
Start: 2019-02-07 | End: 2020-03-24 | Stop reason: SDUPTHER

## 2019-02-07 RX ORDER — IBUPROFEN 800 MG/1
TABLET ORAL
Qty: 270 TABLET | Refills: 3 | Status: SHIPPED | OUTPATIENT
Start: 2019-02-07 | End: 2020-05-21 | Stop reason: SDUPTHER

## 2019-02-07 ASSESSMENT — ENCOUNTER SYMPTOMS
SORE THROAT: 0
SHORTNESS OF BREATH: 0
NAUSEA: 0
COUGH: 0
SINUS PRESSURE: 0
DIARRHEA: 0
VOMITING: 0
BACK PAIN: 0
TROUBLE SWALLOWING: 1

## 2019-02-13 ENCOUNTER — HOSPITAL ENCOUNTER (OUTPATIENT)
Age: 64
Setting detail: SPECIMEN
Discharge: HOME | End: 2019-02-13
Payer: MEDICARE

## 2019-02-13 DIAGNOSIS — E78.00 PURE HYPERCHOLESTEROLEMIA: ICD-10-CM

## 2019-02-13 DIAGNOSIS — I10 ESSENTIAL HYPERTENSION: ICD-10-CM

## 2019-02-13 LAB
ABSOLUTE EOS #: 0.1 K/UL (ref 0–0.44)
ABSOLUTE IMMATURE GRANULOCYTE: <0.03 K/UL (ref 0–0.3)
ABSOLUTE LYMPH #: 2.54 K/UL (ref 1.1–3.7)
ABSOLUTE MONO #: 0.64 K/UL (ref 0.1–1.2)
ALBUMIN SERPL-MCNC: 4.1 G/DL (ref 3.5–5.2)
ALBUMIN/GLOBULIN RATIO: 1.4 (ref 1–2.5)
ALP BLD-CCNC: 26 U/L (ref 35–104)
ALT SERPL-CCNC: 16 U/L (ref 5–33)
ANION GAP SERPL CALCULATED.3IONS-SCNC: 11 MMOL/L (ref 9–17)
AST SERPL-CCNC: 18 U/L
BASOPHILS # BLD: 1 % (ref 0–2)
BASOPHILS ABSOLUTE: 0.05 K/UL (ref 0–0.2)
BILIRUB SERPL-MCNC: 0.55 MG/DL (ref 0.3–1.2)
BUN BLDV-MCNC: 26 MG/DL (ref 8–23)
BUN/CREAT BLD: ABNORMAL (ref 9–20)
CALCIUM SERPL-MCNC: 9.6 MG/DL (ref 8.6–10.4)
CHLORIDE BLD-SCNC: 109 MMOL/L (ref 98–107)
CHOLESTEROL/HDL RATIO: 2.9
CHOLESTEROL: 187 MG/DL
CO2: 26 MMOL/L (ref 20–31)
CREAT SERPL-MCNC: 0.65 MG/DL (ref 0.5–0.9)
DIFFERENTIAL TYPE: NORMAL
EOSINOPHILS RELATIVE PERCENT: 2 % (ref 1–4)
GFR AFRICAN AMERICAN: >60 ML/MIN
GFR NON-AFRICAN AMERICAN: >60 ML/MIN
GFR SERPL CREATININE-BSD FRML MDRD: ABNORMAL ML/MIN/{1.73_M2}
GFR SERPL CREATININE-BSD FRML MDRD: ABNORMAL ML/MIN/{1.73_M2}
GLUCOSE BLD-MCNC: 91 MG/DL (ref 70–99)
HCT VFR BLD CALC: 46.9 % (ref 36.3–47.1)
HDLC SERPL-MCNC: 65 MG/DL
HEMOGLOBIN: 14.5 G/DL (ref 11.9–15.1)
IMMATURE GRANULOCYTES: 0 %
LDL CHOLESTEROL: 98 MG/DL (ref 0–130)
LYMPHOCYTES # BLD: 39 % (ref 24–43)
MCH RBC QN AUTO: 31.4 PG (ref 25.2–33.5)
MCHC RBC AUTO-ENTMCNC: 30.9 G/DL (ref 28.4–34.8)
MCV RBC AUTO: 101.5 FL (ref 82.6–102.9)
MONOCYTES # BLD: 10 % (ref 3–12)
NRBC AUTOMATED: 0 PER 100 WBC
PDW BLD-RTO: 13.3 % (ref 11.8–14.4)
PLATELET # BLD: 272 K/UL (ref 138–453)
PLATELET ESTIMATE: NORMAL
PMV BLD AUTO: 11.7 FL (ref 8.1–13.5)
POTASSIUM SERPL-SCNC: 5.1 MMOL/L (ref 3.7–5.3)
RBC # BLD: 4.62 M/UL (ref 3.95–5.11)
RBC # BLD: NORMAL 10*6/UL
SEG NEUTROPHILS: 48 % (ref 36–65)
SEGMENTED NEUTROPHILS ABSOLUTE COUNT: 3.25 K/UL (ref 1.5–8.1)
SODIUM BLD-SCNC: 146 MMOL/L (ref 135–144)
TOTAL PROTEIN: 7 G/DL (ref 6.4–8.3)
TRIGL SERPL-MCNC: 122 MG/DL
VLDLC SERPL CALC-MCNC: NORMAL MG/DL (ref 1–30)
WBC # BLD: 6.6 K/UL (ref 3.5–11.3)
WBC # BLD: NORMAL 10*3/UL

## 2019-05-07 ENCOUNTER — OFFICE VISIT (OUTPATIENT)
Dept: FAMILY MEDICINE CLINIC | Age: 64
End: 2019-05-07
Payer: MEDICARE

## 2019-05-07 VITALS
DIASTOLIC BLOOD PRESSURE: 82 MMHG | HEART RATE: 64 BPM | WEIGHT: 189.2 LBS | BODY MASS INDEX: 36.95 KG/M2 | RESPIRATION RATE: 18 BRPM | OXYGEN SATURATION: 98 % | SYSTOLIC BLOOD PRESSURE: 130 MMHG

## 2019-05-07 DIAGNOSIS — M75.52 BURSITIS OF LEFT SHOULDER: Primary | ICD-10-CM

## 2019-05-07 PROCEDURE — 1123F ACP DISCUSS/DSCN MKR DOCD: CPT | Performed by: NURSE PRACTITIONER

## 2019-05-07 PROCEDURE — 3017F COLORECTAL CA SCREEN DOC REV: CPT | Performed by: NURSE PRACTITIONER

## 2019-05-07 PROCEDURE — 1036F TOBACCO NON-USER: CPT | Performed by: NURSE PRACTITIONER

## 2019-05-07 PROCEDURE — 4040F PNEUMOC VAC/ADMIN/RCVD: CPT | Performed by: NURSE PRACTITIONER

## 2019-05-07 PROCEDURE — 3288F FALL RISK ASSESSMENT DOCD: CPT | Performed by: NURSE PRACTITIONER

## 2019-05-07 PROCEDURE — 99213 OFFICE O/P EST LOW 20 MIN: CPT | Performed by: NURSE PRACTITIONER

## 2019-05-07 PROCEDURE — G8400 PT W/DXA NO RESULTS DOC: HCPCS | Performed by: NURSE PRACTITIONER

## 2019-05-07 PROCEDURE — G8510 SCR DEP NEG, NO PLAN REQD: HCPCS | Performed by: NURSE PRACTITIONER

## 2019-05-07 PROCEDURE — G8427 DOCREV CUR MEDS BY ELIG CLIN: HCPCS | Performed by: NURSE PRACTITIONER

## 2019-05-07 PROCEDURE — G8417 CALC BMI ABV UP PARAM F/U: HCPCS | Performed by: NURSE PRACTITIONER

## 2019-05-07 PROCEDURE — 1090F PRES/ABSN URINE INCON ASSESS: CPT | Performed by: NURSE PRACTITIONER

## 2019-05-07 RX ORDER — PREDNISONE 20 MG/1
TABLET ORAL
Qty: 18 TABLET | Refills: 0 | Status: SHIPPED | OUTPATIENT
Start: 2019-05-07 | End: 2020-09-16

## 2019-05-07 ASSESSMENT — ENCOUNTER SYMPTOMS
COLOR CHANGE: 0
COUGH: 0
NAUSEA: 0
ALLERGIC/IMMUNOLOGIC NEGATIVE: 1
RESPIRATORY NEGATIVE: 1
EYES NEGATIVE: 1
ABDOMINAL PAIN: 0
GASTROINTESTINAL NEGATIVE: 1
VOMITING: 0
SHORTNESS OF BREATH: 0

## 2019-05-07 ASSESSMENT — PATIENT HEALTH QUESTIONNAIRE - PHQ9
SUM OF ALL RESPONSES TO PHQ QUESTIONS 1-9: 0
SUM OF ALL RESPONSES TO PHQ9 QUESTIONS 1 & 2: 0
SUM OF ALL RESPONSES TO PHQ QUESTIONS 1-9: 0
1. LITTLE INTEREST OR PLEASURE IN DOING THINGS: 0
2. FEELING DOWN, DEPRESSED OR HOPELESS: 0

## 2019-05-07 NOTE — PROGRESS NOTES
[facility]  [address]  Dept: [phone]    Visit Information    Have you changed or started any medications since your last visit including any over-the-counter medicines, vitamins, or herbal medicines? no   Are you having any side effects from any of your medications? -  no  Have you stopped taking any of your medications? Is so, why? -  no    Have you seen any other physician or provider since your last visit? No  Have you had any other diagnostic tests since your last visit? No  Have you been seen in the emergency room and/or had an admission to a hospital since we last saw you? No  Have you had your routine dental cleaning in the past 6 months? no    Have you activated your [employer] account? If not, what are your barriers? Yes     Patient Care Team:  Abby Christy MD as PCP - General (Family Medicine)  Abby Christy MD as PCP - S Attributed Provider  Duy Covarrubias MD as Obstetrician (Gynecologic Oncology)  Hallie Ching DO as Consulting Physician (General Surgery)  Humble Forrest RN as   GARTH Ames CNP as Nurse Practitioner (Certified Nurse Practitioner)    Medical History Review  Past Medical, Family, and Social History reviewed and does not contribute to the patient presenting condition    Health Maintenance   Topic Date Due    Hepatitis C screen  1955    Shingles Vaccine (1 of 2) 01/18/2003    DEXA (modify frequency per FRAX score)  01/18/2018    Pneumococcal 65+ years Vaccine (1 of 2 - PCV13) 01/18/2018    Flu vaccine (Season Ended) 12/31/2020 (Originally 9/1/2019)    Breast cancer screen  07/30/2020    Colon cancer screen colonoscopy  02/01/2021    Lipid screen  02/13/2024    DTaP/Tdap/Td vaccine (2 - Td) 03/29/2026           Tere Hendricks is a 75 y.o. female who presents today for her medical conditions/complaintsas noted below.       Tere Hendricks is here today c/o Shoulder Pain (left dull/achy pain )      Past Medical History:   Diagnosis Date    Hyperlipidemia     Hypertension     Umbilical hernia       Past Surgical History:   Procedure Laterality Date    ABDOMINAL EXPLORATION SURGERY  10/19/2016    small bowel resection    APPENDECTOMY  10/19/2016    [facility]    ENDOMETRIAL BIOPSY      high grade carcinoma-ref to     HYSTERECTOMY      SMALL INTESTINE SURGERY      DAWSON AND BSO  10/19/2016    with pelvic and aorta lymph node dissection, omenectomy    TONSILLECTOMY      UMBILICAL HERNIA REPAIR      UPPER GASTROINTESTINAL ENDOSCOPY         Family History   Problem Relation Age of Onset    Alzheimer's Disease Mother     Dementia Father     Alcohol Abuse Father     Alcohol Abuse Paternal Grandfather        Social History     Tobacco Use    Smoking status: Former Smoker     Types: Cigarettes     Last attempt to quit: 1991     Years since quittin.3    Smokeless tobacco: Never Used   Substance Use Topics    Alcohol use: Yes     Comment: rare      Current Outpatient Medications   Medication Sig Dispense Refill    predniSONE (DELTASONE) 20 MG tablet 3 tabs po daily for 3 days, 2 tabs po daily for 3 days, 1 tab po daily for 3 days 18 tablet 0    atorvastatin (LIPITOR) 10 MG tablet Take 1 tablet by mouth daily 90 tablet 3    ibuprofen (ADVIL;MOTRIN) 800 MG tablet TAKE 1 TABLET EVERY 8 HOURS AS NEEDED FOR PAIN 270 tablet 3    ranitidine (ZANTAC) 150 MG tablet TAKE 1 TABLET TWICE A  tablet 3    Multiple Vitamins-Minerals (THERAPEUTIC MULTIVITAMIN-MINERALS) tablet Take 1 tablet by mouth daily      vitamin B-12 (CYANOCOBALAMIN) 1000 MCG tablet Take 1,000 mcg by mouth daily       No current facility-administered medications for this visit. No Known Allergies      HPI:     Shoulder Pain    The pain is present in the left shoulder. This is a new problem. The current episode started 1 to 4 weeks ago (2 weeks).  There has been no history of extremity trauma. The problem occurs daily. The problem has been unchanged. The quality of the pain is described as aching and dull. The pain is at a severity of 8/10. Pain severity now: pain does not radiate down arm or into chest. Pertinent negatives include no fever, inability to bear weight, itching, joint locking, joint swelling, limited range of motion, numbness, stiffness or tingling. Exacerbated by: certain movements bending over feels like it's pulling, reaching across  She has tried NSAIDS (Aleve / Ibuprofen ) for the symptoms. Her past medical history is significant for osteoarthritis. No history of surgery on shoulder in the past     Health Maintenance:      Subjective:     Review of Systems   Constitutional: Negative. Negative for appetite change, chills, fever and unexpected weight change. HENT: Negative. Eyes: Negative. Respiratory: Negative. Negative for cough and shortness of breath. Cardiovascular: Negative. Negative for chest pain. Gastrointestinal: Negative. Negative for abdominal pain, nausea and vomiting. Endocrine: Negative. Genitourinary: Negative. Musculoskeletal: Positive for arthralgias. Negative for joint swelling, neck pain, neck stiffness and stiffness. Skin: Negative. Negative for color change, itching, pallor, rash and wound. Allergic/Immunologic: Negative. Neurological: Negative. Negative for tingling, weakness and numbness. Hematological: Negative. Objective:     Vitals:    05/07/19 0918   BP: 130/82   Pulse: 64   Resp: 18   SpO2: 98%       Body mass index is 36.95 kg/m². Physical Exam   Constitutional: She is oriented to person, place, and time. She appears well-developed and well-nourished. Non-toxic appearance. She does not have a sickly appearance. No distress. HENT:   Head: Normocephalic and atraumatic.    Right Ear: External ear normal.   Left Ear: External ear normal.   Nose: Nose normal. Mouth/Throat: Oropharynx is clear and moist.   Eyes: Pupils are equal, round, and reactive to light. Conjunctivae are normal. Right eye exhibits no discharge. Left eye exhibits no discharge. No scleral icterus. Neck: Normal range of motion. Neck supple. No tracheal deviation present. Cardiovascular: Normal rate, regular rhythm, normal heart sounds and intact distal pulses. Exam reveals no gallop and no friction rub. No murmur heard. Pulses:       Radial pulses are 2+ on the right side, and 2+ on the left side. Pulmonary/Chest: Effort normal and breath sounds normal. No accessory muscle usage or stridor. No tachypnea. No respiratory distress. She has no decreased breath sounds. She has no wheezes. She has no rhonchi. She has no rales. Abdominal: Soft. Musculoskeletal: Normal range of motion. She exhibits no edema or deformity. Left shoulder: She exhibits tenderness (bursa) and pain. She exhibits normal range of motion, no bony tenderness, no swelling, no effusion, no deformity, no laceration, normal pulse and normal strength. Cervical back: She exhibits normal range of motion, no tenderness, no bony tenderness, no swelling and no pain. ROM appears normal  Drop arm/empty can negative  Hawkin's + for pain   Resisted external rotation + for posterior pain    Neurological: She is alert and oriented to person, place, and time. She has normal strength. She displays no atrophy and no tremor. She displays no seizure activity. Gait normal. GCS eye subscore is 4. GCS verbal subscore is 5. GCS motor subscore is 6. Skin: Skin is warm, dry and intact. No rash noted. She is not diaphoretic. No erythema. No pallor. Psychiatric: She has a normal mood and affect. Her speech is normal and behavior is normal. Judgment and thought content normal. Cognition and memory are normal.         Assessment:         1. Bursitis of left shoulder        Plan:     1.  Bursitis of left shoulder    - predniSONE (DELTASONE) 20 MG tablet; 3 tabs po daily for 3 days, 2 tabs po daily for 3 days, 1 tab po daily for 3 days  Dispense: 18 tablet; Refill: 0    Bursitis/impingement   Heating pad and ice as needed  Avoid aggravating activities  Can use NSAID/Tylenol for pain   May need injection - f/u with [facility] if pain persists despite above  Offered concurrent PT but declined at present due to work schedule       Discussed use, benefit, and side effects of prescribed medications. All patient questions answered. Pt voiced understanding. Reviewed health maintenance. Instructed to continue current medications, diet and exercise. Patient agreedwith treatment plan. Follow up as directed.      Electronically signed by GARTH Robledo CNP on 5/7/2019

## 2019-05-23 ENCOUNTER — TELEPHONE (OUTPATIENT)
Dept: FAMILY MEDICINE CLINIC | Age: 64
End: 2019-05-23

## 2019-05-31 ENCOUNTER — OFFICE VISIT (OUTPATIENT)
Dept: FAMILY MEDICINE CLINIC | Age: 64
End: 2019-05-31
Payer: MEDICARE

## 2019-05-31 VITALS
SYSTOLIC BLOOD PRESSURE: 136 MMHG | HEART RATE: 83 BPM | BODY MASS INDEX: 37.69 KG/M2 | DIASTOLIC BLOOD PRESSURE: 70 MMHG | WEIGHT: 193 LBS | OXYGEN SATURATION: 97 %

## 2019-05-31 DIAGNOSIS — S16.1XXD CERVICAL MYOFASCIAL STRAIN, SUBSEQUENT ENCOUNTER: Primary | ICD-10-CM

## 2019-05-31 PROCEDURE — 3017F COLORECTAL CA SCREEN DOC REV: CPT | Performed by: FAMILY MEDICINE

## 2019-05-31 PROCEDURE — 96372 THER/PROPH/DIAG INJ SC/IM: CPT | Performed by: FAMILY MEDICINE

## 2019-05-31 PROCEDURE — G8417 CALC BMI ABV UP PARAM F/U: HCPCS | Performed by: FAMILY MEDICINE

## 2019-05-31 PROCEDURE — 1123F ACP DISCUSS/DSCN MKR DOCD: CPT | Performed by: FAMILY MEDICINE

## 2019-05-31 PROCEDURE — G8427 DOCREV CUR MEDS BY ELIG CLIN: HCPCS | Performed by: FAMILY MEDICINE

## 2019-05-31 PROCEDURE — 1036F TOBACCO NON-USER: CPT | Performed by: FAMILY MEDICINE

## 2019-05-31 PROCEDURE — 99213 OFFICE O/P EST LOW 20 MIN: CPT | Performed by: FAMILY MEDICINE

## 2019-05-31 PROCEDURE — 4040F PNEUMOC VAC/ADMIN/RCVD: CPT | Performed by: FAMILY MEDICINE

## 2019-05-31 PROCEDURE — G8400 PT W/DXA NO RESULTS DOC: HCPCS | Performed by: FAMILY MEDICINE

## 2019-05-31 PROCEDURE — 1090F PRES/ABSN URINE INCON ASSESS: CPT | Performed by: FAMILY MEDICINE

## 2019-05-31 RX ORDER — METHYLPREDNISOLONE ACETATE 80 MG/ML
80 INJECTION, SUSPENSION INTRA-ARTICULAR; INTRALESIONAL; INTRAMUSCULAR; SOFT TISSUE ONCE
Status: COMPLETED | OUTPATIENT
Start: 2019-05-31 | End: 2019-05-31

## 2019-05-31 RX ORDER — TRAMADOL HYDROCHLORIDE 50 MG/1
50 TABLET ORAL EVERY 4 HOURS PRN
Qty: 42 TABLET | Refills: 0 | Status: SHIPPED | OUTPATIENT
Start: 2019-05-31 | End: 2019-06-07

## 2019-05-31 RX ADMIN — METHYLPREDNISOLONE ACETATE 80 MG: 80 INJECTION, SUSPENSION INTRA-ARTICULAR; INTRALESIONAL; INTRAMUSCULAR; SOFT TISSUE at 12:14

## 2019-05-31 ASSESSMENT — ENCOUNTER SYMPTOMS
COUGH: 0
VOMITING: 0
DIARRHEA: 0
SINUS PRESSURE: 0
BACK PAIN: 1
SORE THROAT: 0
SHORTNESS OF BREATH: 0
NAUSEA: 0

## 2019-05-31 NOTE — PROGRESS NOTES
Kwabena Lomeli is a 75 y.o. female who presents todayfor her medical conditions/complaints as noted below. Kwabena Lomeli is here today c/o  Last two weeks having left shoulder pain not responsive to prednisone taper.    :     Visit Information    Have you changed or started any medications since your last visit including any over-the-counter medicines, vitamins, or herbal medicines? no   Are you having any side effects from any of your medications? -  no  Have you stopped taking any of your medications? Is so, why? -  no    Have you seen any other physician or provider since your last visit? No  Have you had any other diagnostic tests since your last visit? No  Have you been seen in the emergency room and/or had an admission to a hospital since we last saw you? Yes - Records Obtained  Have you had your routine dental cleaning in the past 6 months? no    Have you activated your [employer] account? If not, what are your barriers?  Yes     Patient Care Team:  Geronimo Villagomez MD as PCP - General (Family Medicine)  Geronimo Villagomez MD as PCP - [facility] Provider  Seble Bowling MD as Obstetrician (Gynecologic Oncology)  Sapna Palomares DO as Consulting Physician (General Surgery)  Cosmo Dandy, RN as   GARTH Viera CNP as Nurse Practitioner (Certified Nurse Practitioner)    Medical History Review  Past Medical, Family, and Social History reviewed and does not contribute to the patient presenting condition    Health Maintenance   Topic Date Due    Hepatitis C screen  1955    Shingles Vaccine (1 of 2) 01/18/2003    DEXA (modify frequency per FRAX score)  01/18/2018    Flu vaccine (Season Ended) 12/31/2020 (Originally 9/1/2019)    Pneumococcal 65+ years Vaccine (1 of 2 - PCV13) 05/31/2021 (Originally 1/18/2018)    Breast cancer screen  07/30/2020    Colon cancer screen colonoscopy  02/01/2021    Lipid screen  02/13/2024    DTaP/Tdap/Td vaccine (2 - Td) this visit. No Known Allergies      Subjective:   Review of Systems   Constitutional: Negative for chills, diaphoresis and fever. HENT: Negative for congestion, sinus pressure and sore throat. Eyes: Negative for visual disturbance. Respiratory: Negative for cough and shortness of breath. Cardiovascular: Negative for chest pain and leg swelling. Gastrointestinal: Negative for diarrhea, nausea and vomiting. Genitourinary: Negative for dysuria, menstrual problem, urgency and vaginal discharge. Musculoskeletal: Positive for back pain and myalgias. Negative for arthralgias. Skin: Negative for rash. Neurological: Negative for weakness, numbness and headaches. Psychiatric/Behavioral: Positive for sleep disturbance.       :   /70   Pulse 83   Wt 193 lb (87.5 kg)   LMP  (Exact Date)   SpO2 97%   BMI 37.69 kg/m²     Physical Exam   Constitutional: She is oriented to person, place, and time. She appears well-developed and well-nourished. No distress. HENT:   Head: Normocephalic and atraumatic. Mouth/Throat: No oropharyngeal exudate. Eyes: No scleral icterus. Neck: Neck supple. Carotid bruit is not present. Cardiovascular: Exam reveals no gallop and no friction rub. No murmur heard. Pulmonary/Chest: No respiratory distress. She has no wheezes. She has no rales. She exhibits no tenderness. Musculoskeletal: She exhibits tenderness (tender scapular musculature cleansed and injected trigger point with 80 mg depo medrol and 1/2 cc 2% lidocaine). She exhibits no edema. Lymphadenopathy:     She has no cervical adenopathy. Neurological: She is alert and oriented to person, place, and time. No cranial nerve deficit. Skin: No rash noted. She is not diaphoretic. Psychiatric: She has a normal mood and affect. Her behavior is normal. Judgment and thought content normal.       Assessment:       Diagnosis Orders   1.  Cervical myofascial strain, subsequent encounter  traMADol (ULTRAM) 50 MG tablet         Plan:      Return if symptoms worsen or fail to improve. No orders of the defined types were placed in this encounter. Orders Placed This Encounter   Medications    traMADol (ULTRAM) 50 MG tablet     Sig: Take 1 tablet by mouth every 4 hours as needed for Pain for up to 7 days. Intended supply: 7 days. Take lowest dose possible to manage pain     Dispense:  42 tablet     Refill:  0     Reduce doses taken as pain becomes manageable    methylPREDNISolone acetate (DEPO-MEDROL) injection 80 mg     She is leaving for vacation next week upon her return if she is still having pain she is to call us for PT consult. She is going to have the TMST completed.

## 2019-07-23 ENCOUNTER — TELEPHONE (OUTPATIENT)
Dept: GYNECOLOGIC ONCOLOGY | Age: 64
End: 2019-07-23

## 2019-09-13 ENCOUNTER — OFFICE VISIT (OUTPATIENT)
Dept: GYNECOLOGIC ONCOLOGY | Age: 64
End: 2019-09-13
Payer: MEDICARE

## 2019-09-13 VITALS
HEIGHT: 60 IN | SYSTOLIC BLOOD PRESSURE: 194 MMHG | WEIGHT: 191 LBS | BODY MASS INDEX: 37.5 KG/M2 | DIASTOLIC BLOOD PRESSURE: 89 MMHG | OXYGEN SATURATION: 97 % | HEART RATE: 76 BPM

## 2019-09-13 DIAGNOSIS — Z12.31 SCREENING MAMMOGRAM, ENCOUNTER FOR: Primary | ICD-10-CM

## 2019-09-13 PROCEDURE — 99214 OFFICE O/P EST MOD 30 MIN: CPT | Performed by: OBSTETRICS & GYNECOLOGY

## 2019-09-13 PROCEDURE — G8427 DOCREV CUR MEDS BY ELIG CLIN: HCPCS | Performed by: OBSTETRICS & GYNECOLOGY

## 2019-09-13 PROCEDURE — G8400 PT W/DXA NO RESULTS DOC: HCPCS | Performed by: OBSTETRICS & GYNECOLOGY

## 2019-09-13 PROCEDURE — 1090F PRES/ABSN URINE INCON ASSESS: CPT | Performed by: OBSTETRICS & GYNECOLOGY

## 2019-09-13 PROCEDURE — 1036F TOBACCO NON-USER: CPT | Performed by: OBSTETRICS & GYNECOLOGY

## 2019-09-13 PROCEDURE — 4040F PNEUMOC VAC/ADMIN/RCVD: CPT | Performed by: OBSTETRICS & GYNECOLOGY

## 2019-09-13 PROCEDURE — G8417 CALC BMI ABV UP PARAM F/U: HCPCS | Performed by: OBSTETRICS & GYNECOLOGY

## 2019-09-13 PROCEDURE — 1123F ACP DISCUSS/DSCN MKR DOCD: CPT | Performed by: OBSTETRICS & GYNECOLOGY

## 2019-09-13 PROCEDURE — 3017F COLORECTAL CA SCREEN DOC REV: CPT | Performed by: OBSTETRICS & GYNECOLOGY

## 2019-09-13 ASSESSMENT — ENCOUNTER SYMPTOMS
GASTROINTESTINAL NEGATIVE: 1
EYES NEGATIVE: 1
RESPIRATORY NEGATIVE: 1

## 2019-09-13 NOTE — PROGRESS NOTES
[address] [facility], [facility], [address] [facility] [address] present for her endometrial cancer surveillance visit. HPI: Mrs. Melodie Toro is a 57-year-old,  2, para 2 female who was seen originally on 2016 by [facility] gynecologic oncology for endometrial cancer. She was referred by Dr. Kyree Butler, OB/GYN. She presented with postmenopausal bleeding 2 months prior. Her graph Dr. Kyree Butler ordered pelvic ultrasound on 2016 that revealed a thickening in the endometrium at 2.5 cm. An endometrial biopsy on 2016 revealed a \"high-grade mixed papillary serous (60%) and clear cell ( 40% )adenocarcinoma. Abdominal pelvic CT scans and chest CT scan was performed on 2016 and these revealed no evidence of metastatic disease although a nonspecific 4 mm left lower lobe pulmonary nodule was noted. Surveillance was recommended. On 10/19/2016 she underwent a laparoscopic hysterectomy, BSO no, pelvic and periaortic lymphadenectomies and omental biopsy as well as an appendectomy and a subsequent small bowel resection with extensive lysis of adhesions. Final pathology revealed a stage Ia, high-grade, mixed serous 90% and clear cell 10% adenocarcinoma. The patient had consultations with both medical oncology and radiation oncology. She had 6 cycles of adjuvant chemotherapy and declined the vaginal cuff brachii therapy. Following completion the patient had CT scans of the chest abdomen and pelvis on 2017. The patient's pulmonary and thyroid nodules were stable. There was no evidence of recurrence. The patient had a thyroid nodule biopsy via ultrasound on May 5, 2017 that was benign. Her last mammogram was 2017 and was negative. Her last colonoscopy was negative in . The patient has had follow-up CTs of the chest on 2017 and May 17, 2018 and January 15, 2019.   They have all

## 2019-10-15 ENCOUNTER — HOSPITAL ENCOUNTER (OUTPATIENT)
Dept: MAMMOGRAPHY | Age: 64
Discharge: HOME | End: 2019-10-17
Payer: MEDICARE

## 2019-10-15 DIAGNOSIS — Z12.31 SCREENING MAMMOGRAM, ENCOUNTER FOR: ICD-10-CM

## 2019-10-15 PROCEDURE — 77063 BREAST TOMOSYNTHESIS BI: CPT

## 2020-03-13 ENCOUNTER — OFFICE VISIT (OUTPATIENT)
Dept: GYNECOLOGIC ONCOLOGY | Age: 65
End: 2020-03-13
Payer: MEDICARE

## 2020-03-13 VITALS
WEIGHT: 191.6 LBS | BODY MASS INDEX: 36.17 KG/M2 | SYSTOLIC BLOOD PRESSURE: 142 MMHG | DIASTOLIC BLOOD PRESSURE: 82 MMHG | HEART RATE: 68 BPM | TEMPERATURE: 97.3 F | HEIGHT: 61 IN

## 2020-03-13 PROCEDURE — 99214 OFFICE O/P EST MOD 30 MIN: CPT | Performed by: PHYSICIAN ASSISTANT

## 2020-03-13 PROCEDURE — G8427 DOCREV CUR MEDS BY ELIG CLIN: HCPCS | Performed by: PHYSICIAN ASSISTANT

## 2020-03-13 PROCEDURE — 4040F PNEUMOC VAC/ADMIN/RCVD: CPT | Performed by: PHYSICIAN ASSISTANT

## 2020-03-13 PROCEDURE — G8400 PT W/DXA NO RESULTS DOC: HCPCS | Performed by: PHYSICIAN ASSISTANT

## 2020-03-13 PROCEDURE — G8417 CALC BMI ABV UP PARAM F/U: HCPCS | Performed by: PHYSICIAN ASSISTANT

## 2020-03-13 PROCEDURE — 1123F ACP DISCUSS/DSCN MKR DOCD: CPT | Performed by: PHYSICIAN ASSISTANT

## 2020-03-13 PROCEDURE — 1090F PRES/ABSN URINE INCON ASSESS: CPT | Performed by: PHYSICIAN ASSISTANT

## 2020-03-13 PROCEDURE — 1036F TOBACCO NON-USER: CPT | Performed by: PHYSICIAN ASSISTANT

## 2020-03-13 PROCEDURE — G8484 FLU IMMUNIZE NO ADMIN: HCPCS | Performed by: PHYSICIAN ASSISTANT

## 2020-03-13 PROCEDURE — 3017F COLORECTAL CA SCREEN DOC REV: CPT | Performed by: PHYSICIAN ASSISTANT

## 2020-03-13 RX ORDER — AMOXICILLIN 250 MG/1
CAPSULE ORAL EVERY 6 HOURS
COMMUNITY
Start: 2020-03-09 | End: 2020-09-16

## 2020-03-13 ASSESSMENT — ENCOUNTER SYMPTOMS: BACK PAIN: 1

## 2020-03-13 NOTE — PATIENT INSTRUCTIONS
1. Return to clinic in 6 months for follow up surveillance  2. Will continue to follow with Dr. Morena Crenshaw with CT scans and   3. You will be due for mammogram in October 2020, future order placed  4.  Encouraged to call or return to clinic sooner with questions or concerns

## 2020-03-13 NOTE — PROGRESS NOTES
limits < 5.5 units. She continues to follow with her medical oncologist Dr. Jenna Jaimes, most recently seen 2/10/2020. Plans for repeat CT chest abdomen pelvis planned for July 2020. She has follow with our practice and done well. She had biopsy taken of vaginal cuff lesion in June 8 2017, this resulted as benign granulation tissue. Today, she states she is feeling well. Denies abdominal or pelvic pain. Denies vaginal bleeding or irritation. She denies new lumps or bumps, no new aches or pains. No changes to her urination or bowel habits. Notes changes in her bowels every since her surgery with bowel resection. Pt denies blood in urine or stool. Pt blood pressure is mildly elevated. States she was previously on medication however was taken off due to intentional weight loss and exercise, she attends weight watchers. Pt denies chest pain, shortness of breath, headache, vision changes. History of tobacco use, pt states she stopped smoking in 1991. She is up to date on mammogram in October 2019, BIRADS-1. Pt is up to date on colonoscopy screening last obtained 2011, pt will be due for repeat colon cancer screening next year 2021. ROS:  I have personally reviewed and agree with the review of systems done by my ancillary staff in the [facility] documentation. Physical Exam:    Vitals:    03/13/20 1144 03/13/20 1145   BP: (!) 144/92 (!) 142/82   Pulse: 68    Temp: 97.3 °F (36.3 °C)    TempSrc: Oral    Weight: 191 lb 9.6 oz (86.9 kg)    Height: 5' 1\" (1.549 m)        General: well- appearing, no acute distress    HEENT: Thyroid normal size. No cervical or supraclavicular lymphadenopathy. Lungs: Clear to auscultate bilaterally to the bases    No CVA tenderness present bilaterally. Heart: Auscultation reveals regular rate and rhythm. No murmurs or gallops appreciated. Abdomen: Flat, soft. Diffusely non tender to deep palpation. Midline vertical scar present, no herniations. No hepatomegaly.  No splenomegaly. No masses or ascites. No inguinal lymphadenopathy bilaterally    Extremities: No edema, deformities, or calf tenderness bilaterally. Pelvic: The patient has normal female external genitalia including, vulva, urethra, vagina, perineum, Bartholin glands. Uterus, bilateral fallopian tubes and adnexae, and cervix are  surgically absent. Speculum examination reveals no blood or discharge in vaginal vault. The vaginal cuff well-intact with no signs of erythema, induration, separation, or granulation tissue. Bimanual examination: Bladder is non-tender, without mass. There are no palpable vaginal nodules and no other pelvic masses, tenderness or pathology noted. 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 She has undergone surgical treatment and adjuvant 6 cycles of chemotherapy completed in April 2017. Pt has been stable on surveillance with physical examinations CT scans and  tumor markers. No clinical evidence of recurrence or metastasis, therefore no changes to her care plan at this time. Will continue to follow with Dr. Marita Linton for scans and lab work. Her Surveillance plan is as follows:   1. Return to clinic in 6 months for follow up surveillance    2. Advised pt to call PCP and notify of elevated blood pressure readings, she was last seen in May 2019. 3. Plan to continue care with medical oncologist Dr. Marita Linton, will follow along with CT scans and .    4. Pt to have screening mammogram in October 2020    5.  Call or return to clinic sooner with questions or concerns     I spent 25 minutes providing care to

## 2020-03-24 RX ORDER — ATORVASTATIN CALCIUM 10 MG/1
10 TABLET, FILM COATED ORAL DAILY
Qty: 90 TABLET | Refills: 3 | Status: SHIPPED | OUTPATIENT
Start: 2020-03-24 | End: 2021-04-02 | Stop reason: SDUPTHER

## 2020-05-21 RX ORDER — IBUPROFEN 800 MG/1
TABLET ORAL
Qty: 270 TABLET | Refills: 3 | Status: SHIPPED | OUTPATIENT
Start: 2020-05-21 | End: 2021-07-08 | Stop reason: SDUPTHER

## 2020-05-21 NOTE — TELEPHONE ENCOUNTER
Next Visit Date:  Future Appointments   Date Time Provider Brandyn Marla   9/16/2020 11:00 AM [place], [place] [facility] [address] Maintenance   Topic Date Due    Hepatitis C screen  1955    Shingles Vaccine (1 of 2) 01/18/2003    DEXA (modify frequency per FRAX score)  01/18/2008    Annual Wellness Visit (AWV)  05/29/2019    Lipid screen  02/13/2020    Flu vaccine (Season Ended) 12/31/2020 (Originally 9/1/2020)    Pneumococcal 65+ years Vaccine (1 of 1 - PPSV23) 05/31/2021 (Originally 1/18/2018)    Colon cancer screen colonoscopy  02/01/2021    Breast cancer screen  10/15/2021    DTaP/Tdap/Td vaccine (2 - Td) 03/29/2026    Hepatitis A vaccine  Aged Out    Hepatitis B vaccine  Aged Out    Hib vaccine  Aged Out    Meningococcal (ACWY) vaccine  Aged Out       No results found for: LABA1C          ( goal A1C is < 7)   No results found for: LABMICR  LDL Cholesterol (mg/dL)   Date Value   02/13/2019 98   03/13/2018 94       (goal LDL is <100)   AST (U/L)   Date Value   02/13/2019 18     ALT (U/L)   Date Value   02/13/2019 16     BUN (mg/dL)   Date Value   02/13/2019 26 (H)     BP Readings from Last 3 Encounters:   03/13/20 (!) 142/82   09/13/19 (!) 194/89   05/31/19 136/70          (goal 120/80)    All Future Testing planned in [facility]  Lab Frequency Next Occurrence   SUE DIGITAL SCREEN W OR WO CAD BILATERAL Once 10/16/2020               Patient Active Problem List:     Hypertension     Hyperlipidemia     Numbness and tingling of right leg     Numbness in right leg     Meralgia paresthetica of right side     Endometrial cancer determined by uterine biopsy ([facility] [address]     10/19/16 TLH/BSO, pelvic and paraaortic lymphadenectomy, omental biopsy, appendectomy, small bowel resection day(s)

## 2020-09-16 ENCOUNTER — OFFICE VISIT (OUTPATIENT)
Dept: GYNECOLOGIC ONCOLOGY | Age: 65
End: 2020-09-16
Payer: MEDICARE

## 2020-09-16 VITALS
DIASTOLIC BLOOD PRESSURE: 97 MMHG | HEART RATE: 72 BPM | HEIGHT: 62 IN | TEMPERATURE: 97.7 F | SYSTOLIC BLOOD PRESSURE: 149 MMHG | BODY MASS INDEX: 33.97 KG/M2 | WEIGHT: 184.6 LBS

## 2020-09-16 PROCEDURE — G8427 DOCREV CUR MEDS BY ELIG CLIN: HCPCS | Performed by: PHYSICIAN ASSISTANT

## 2020-09-16 PROCEDURE — G8400 PT W/DXA NO RESULTS DOC: HCPCS | Performed by: PHYSICIAN ASSISTANT

## 2020-09-16 PROCEDURE — G8417 CALC BMI ABV UP PARAM F/U: HCPCS | Performed by: PHYSICIAN ASSISTANT

## 2020-09-16 PROCEDURE — 99214 OFFICE O/P EST MOD 30 MIN: CPT | Performed by: PHYSICIAN ASSISTANT

## 2020-09-16 PROCEDURE — 3017F COLORECTAL CA SCREEN DOC REV: CPT | Performed by: PHYSICIAN ASSISTANT

## 2020-09-16 PROCEDURE — 4040F PNEUMOC VAC/ADMIN/RCVD: CPT | Performed by: PHYSICIAN ASSISTANT

## 2020-09-16 PROCEDURE — 1123F ACP DISCUSS/DSCN MKR DOCD: CPT | Performed by: PHYSICIAN ASSISTANT

## 2020-09-16 PROCEDURE — 1090F PRES/ABSN URINE INCON ASSESS: CPT | Performed by: PHYSICIAN ASSISTANT

## 2020-09-16 PROCEDURE — 1036F TOBACCO NON-USER: CPT | Performed by: PHYSICIAN ASSISTANT

## 2020-09-16 ASSESSMENT — ENCOUNTER SYMPTOMS
CONSTIPATION: 1
BACK PAIN: 1

## 2020-09-16 NOTE — PROGRESS NOTES
recent  antigen level drawn in July 2020 was within normal limits < 5.5 units. Most recent CT chest abdomen pelvis planned July 7 2020 was overall unchanged from prior. No evidence for mass or adenopathy that is now. She is a former smoker, 15 pack/year. Cessation in 1990s. Today, she has no concerns. No new lumps or bumps. She denies abdominal or pelvic pain. Denies vaginal bleeding, discharge, irritation, or odor. She notes no changes to urination or bowel habits. Stable since her hysterectomy. She denies blood in urine or stool. She is ambulating at baseline. Her appetite is stable. She is due for mammogram screening. She is aware. Pt is up to date on colonoscopy screening last obtained 2011 however her oncologist is recommending follow up with gastroenterology sooner for possible repeat colonoscopy due to narrowing of colon  noted on CT scans. She is due to follow up with her PCP. ROS:  I have personally reviewed and agree with the review of systems done by my ancillary staff in the [facility] documentation. Physical Exam:    Vitals:    09/16/20 1041 09/16/20 1109   BP: (!) 158/97 (!) 149/97   Pulse: 75 72   Temp: 97.7 °F (36.5 °C)    TempSrc: Temporal    Weight: 184 lb 9.6 oz (83.7 kg)    Height: 5' 1.5\" (1.562 m)        General: well- appearing, no acute distress    HEENT: Thyroid normal size. No cervical or supraclavicular lymphadenopathy. Lungs: Clear to auscultate bilaterally to the bases    No CVA tenderness present bilaterally. Heart: Auscultation reveals regular rate and rhythm. No murmurs or gallops appreciated. Abdomen: Flat, soft. Diffusely non tender to deep palpation. Midline vertical scar present, no herniations. No hepatomegaly. No splenomegaly. No masses or ascites. No inguinal lymphadenopathy bilaterally    Extremities: No edema, deformities, or calf tenderness bilaterally.     Pelvic: The patient has normal female external genitalia including, vulva, urethra, her and answering her questions. The patient's pertinent images, labs, and previous records and procedures were reviewed.      Electronically signed by Veronica Christian PA-C on 9/16/2020 at 11:25 AM EDT

## 2020-09-16 NOTE — PATIENT INSTRUCTIONS
1. Return to clinic in 6 months for follow up surveillance  2. Encouraged to establish and follow up with PCP regarding elevated blood pressure readings and general wellness including mammogram and colonoscopy screenings  3. Continue to follow CT scans and  blood draws with Dr. Verona Morales  4.  Call or return to clinic sooner with any questions or concerns

## 2020-09-16 NOTE — PROGRESS NOTES
Review of Systems   Gastrointestinal: Positive for constipation (not new,since hernia repair). Genitourinary: Positive for frequency (not new). Musculoskeletal: Positive for back pain (chronic ). Hematological: Bruises/bleeds easily.

## 2020-12-17 ENCOUNTER — OFFICE VISIT (OUTPATIENT)
Dept: FAMILY MEDICINE CLINIC | Age: 65
End: 2020-12-17
Payer: MEDICARE

## 2020-12-17 VITALS
WEIGHT: 181.8 LBS | TEMPERATURE: 97.2 F | HEART RATE: 84 BPM | SYSTOLIC BLOOD PRESSURE: 132 MMHG | BODY MASS INDEX: 33.79 KG/M2 | DIASTOLIC BLOOD PRESSURE: 82 MMHG | OXYGEN SATURATION: 96 %

## 2020-12-17 PROCEDURE — G8427 DOCREV CUR MEDS BY ELIG CLIN: HCPCS | Performed by: FAMILY MEDICINE

## 2020-12-17 PROCEDURE — G8484 FLU IMMUNIZE NO ADMIN: HCPCS | Performed by: FAMILY MEDICINE

## 2020-12-17 PROCEDURE — 1123F ACP DISCUSS/DSCN MKR DOCD: CPT | Performed by: FAMILY MEDICINE

## 2020-12-17 PROCEDURE — 99214 OFFICE O/P EST MOD 30 MIN: CPT | Performed by: FAMILY MEDICINE

## 2020-12-17 PROCEDURE — G8510 SCR DEP NEG, NO PLAN REQD: HCPCS | Performed by: FAMILY MEDICINE

## 2020-12-17 PROCEDURE — 3288F FALL RISK ASSESSMENT DOCD: CPT | Performed by: FAMILY MEDICINE

## 2020-12-17 PROCEDURE — 1090F PRES/ABSN URINE INCON ASSESS: CPT | Performed by: FAMILY MEDICINE

## 2020-12-17 PROCEDURE — 4040F PNEUMOC VAC/ADMIN/RCVD: CPT | Performed by: FAMILY MEDICINE

## 2020-12-17 PROCEDURE — 3017F COLORECTAL CA SCREEN DOC REV: CPT | Performed by: FAMILY MEDICINE

## 2020-12-17 PROCEDURE — G8400 PT W/DXA NO RESULTS DOC: HCPCS | Performed by: FAMILY MEDICINE

## 2020-12-17 PROCEDURE — 1036F TOBACCO NON-USER: CPT | Performed by: FAMILY MEDICINE

## 2020-12-17 PROCEDURE — G8417 CALC BMI ABV UP PARAM F/U: HCPCS | Performed by: FAMILY MEDICINE

## 2020-12-17 PROCEDURE — 96372 THER/PROPH/DIAG INJ SC/IM: CPT | Performed by: FAMILY MEDICINE

## 2020-12-17 RX ORDER — METHYLPREDNISOLONE ACETATE 80 MG/ML
80 INJECTION, SUSPENSION INTRA-ARTICULAR; INTRALESIONAL; INTRAMUSCULAR; SOFT TISSUE ONCE
Status: COMPLETED | OUTPATIENT
Start: 2020-12-17 | End: 2020-12-17

## 2020-12-17 RX ORDER — PREDNISONE 20 MG/1
20 TABLET ORAL 2 TIMES DAILY
Qty: 10 TABLET | Refills: 0 | Status: SHIPPED | OUTPATIENT
Start: 2020-12-17 | End: 2020-12-22

## 2020-12-17 RX ORDER — CYCLOBENZAPRINE HCL 10 MG
10 TABLET ORAL NIGHTLY PRN
Qty: 10 TABLET | Refills: 0 | Status: SHIPPED | OUTPATIENT
Start: 2020-12-17 | End: 2020-12-27

## 2020-12-17 RX ADMIN — METHYLPREDNISOLONE ACETATE 80 MG: 80 INJECTION, SUSPENSION INTRA-ARTICULAR; INTRALESIONAL; INTRAMUSCULAR; SOFT TISSUE at 15:25

## 2020-12-17 SDOH — ECONOMIC STABILITY: INCOME INSECURITY: HOW HARD IS IT FOR YOU TO PAY FOR THE VERY BASICS LIKE FOOD, HOUSING, MEDICAL CARE, AND HEATING?: NOT HARD AT ALL

## 2020-12-17 SDOH — ECONOMIC STABILITY: FOOD INSECURITY: WITHIN THE PAST 12 MONTHS, YOU WORRIED THAT YOUR FOOD WOULD RUN OUT BEFORE YOU GOT MONEY TO BUY MORE.: NEVER TRUE

## 2020-12-17 SDOH — ECONOMIC STABILITY: TRANSPORTATION INSECURITY
IN THE PAST 12 MONTHS, HAS THE LACK OF TRANSPORTATION KEPT YOU FROM MEDICAL APPOINTMENTS OR FROM GETTING MEDICATIONS?: NO

## 2020-12-17 SDOH — ECONOMIC STABILITY: TRANSPORTATION INSECURITY
IN THE PAST 12 MONTHS, HAS LACK OF TRANSPORTATION KEPT YOU FROM MEETINGS, WORK, OR FROM GETTING THINGS NEEDED FOR DAILY LIVING?: NO

## 2020-12-17 SDOH — ECONOMIC STABILITY: FOOD INSECURITY: WITHIN THE PAST 12 MONTHS, THE FOOD YOU BOUGHT JUST DIDN'T LAST AND YOU DIDN'T HAVE MONEY TO GET MORE.: NEVER TRUE

## 2020-12-17 ASSESSMENT — PATIENT HEALTH QUESTIONNAIRE - PHQ9
2. FEELING DOWN, DEPRESSED OR HOPELESS: 0
SUM OF ALL RESPONSES TO PHQ QUESTIONS 1-9: 0
SUM OF ALL RESPONSES TO PHQ QUESTIONS 1-9: 0
1. LITTLE INTEREST OR PLEASURE IN DOING THINGS: 0
SUM OF ALL RESPONSES TO PHQ QUESTIONS 1-9: 0
SUM OF ALL RESPONSES TO PHQ9 QUESTIONS 1 & 2: 0

## 2020-12-17 NOTE — PROGRESS NOTES
Progress Note    Gala Dougherty is a 77 y.o.  female who presents today alone for evaluation of   Chief Complaint   Patient presents with    Pain     RT leg, buttock area. Occasionally on LT.  sxs: 6 months, getting worse           HPI:   Patient is here for same day visit today. Patient states she has had right leg pain for the past 2 weeks. She states she has had left leg pain for the past 6 mos. Patient states she works at [employer] and is on her feet frequently at work. Patient states sitting and standing makes it uncomfortable. Patient states the pain alternates from her left to her right. Patient denies recent injury or trauma. Patient states she did get a massage however this did not help. She states she has not had any relief with OTC therapy. 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 Patient denies saddle anesthesia. Patient denies urinary retention. Patient denies stool incontinence. Patient denies rash or redness. Patient rates the pain as moderate. Patient denies radiation into her whole leg and locates the pain to her buttock on both sides. Patient describes the pain as a dull ache.      PHQ-9 Total Score: 0 (12/17/2020  2:23 PM)      Health Maintenance Due   Topic Date Due    Hepatitis C screen  1955    Shingles Vaccine (1 of 2) 01/18/2003    DEXA (modify frequency per FRAX score)  01/18/2008    Annual Wellness Visit (AWV)  05/29/2019    Lipid screen  02/13/2020    Flu vaccine (1) 09/01/2020        Current Medications:     Current Outpatient Medications   Medication Sig Dispense Refill    predniSONE (DELTASONE) 20 MG tablet Take 1 tablet by mouth 2 times daily for 5 days 10 tablet 0    cyclobenzaprine (FLEXERIL) 10 MG tablet Take 1 tablet by mouth nightly as needed for Muscle spasms 10 tablet 0    ibuprofen (ADVIL;MOTRIN) 800 MG tablet TAKE 1 TABLET EVERY 8 HOURS AS NEEDED FOR PAIN 270 tablet 3  atorvastatin (LIPITOR) 10 MG tablet Take 1 tablet by mouth daily 90 tablet 3    ranitidine (ZANTAC) 150 MG tablet TAKE 1 TABLET TWICE A  tablet 3    vitamin B-12 (CYANOCOBALAMIN) 1000 MCG tablet Take 1,000 mcg by mouth daily      Multiple Vitamins-Minerals (THERAPEUTIC MULTIVITAMIN-MINERALS) tablet Take 1 tablet by mouth daily       Current Facility-Administered Medications   Medication Dose Route Frequency Provider Last Rate Last Admin    methylPREDNISolone acetate (DEPO-MEDROL) injection 80 mg  80 mg Intramuscular Once Payette Sinning, DO           Allergies:   No Known Allergies     Medical History:     Past Medical History:   Diagnosis Date    Hyperlipidemia     Hypertension     Umbilical hernia        Past Surgical History:   Procedure Laterality Date    ABDOMINAL EXPLORATION SURGERY  10/19/2016    small bowel resection    APPENDECTOMY  10/19/2016    [facility]OSCOPY  2011    [facility]    ENDOMETRIAL BIOPSY  09/12/146    high grade carcinoma-ref to     HYSTERECTOMY      SMALL INTESTINE SURGERY      DAWSON AND BSO  10/19/2016    with pelvic and aorta lymph node dissection, omenectomy    TONSILLECTOMY      UMBILICAL HERNIA REPAIR  2010    UPPER GASTROINTESTINAL ENDOSCOPY  2011       Family History   Problem Relation Age of Onset    Alzheimer's Disease Mother     Dementia Father     Alcohol Abuse Father     Alcohol Abuse Paternal Grandfather         Social History:     Social History     Socioeconomic History    Marital status:       Spouse name: Not on file    Number of children: Not on file    Years of education: Not on file    Highest education level: Not on file   Occupational History    Not on file   Social Needs    Financial resource strain: Not hard at all    Food insecurity     Worry: Never true     Inability: Never true   [employer] needs     Medical: No     Non-medical: No   Tobacco Use    Smoking status: Former Smoker     Types: Cigarettes Quit date: 1991     Years since quittin.9    Smokeless tobacco: Never Used   Substance and Sexual Activity    Alcohol use: Yes     Comment: rare    Drug use: No    Sexual activity: Yes   Lifestyle    Physical activity     Days per week: Not on file     Minutes per session: Not on file    Stress: Not on file   Relationships    Social connections     Talks on phone: Not on file     Gets together: Not on file     Attends Gnosticism service: Not on file     Active member of club or organization: Not on file     Attends meetings of clubs or organizations: Not on file     Relationship status: Not on file    Intimate partner violence     Fear of current or ex partner: Not on file     Emotionally abused: Not on file     Physically abused: Not on file     Forced sexual activity: Not on file   Other Topics Concern    Not on file   Social History Narrative    Not on file        ROS:     Constitutional: No fevers, chills, fatigue. ENT: No nasal congestion or sore throat  Respiratory: No difficulty in breathing or cough. Cardiovascular: No chest pain, palpitations or shortness of breath  Gastrointestinal: No abdominal pain or change in bowel movements. Genitourinary: No change in urinary frequency or dysuria. Skin: No rashes or skin lesions. Neurological: No weakness. No headaches. MSK: +left & right buttock pain         Last Filed Vitals:  /82   Pulse 84   Temp 97.2 °F (36.2 °C) (Temporal)   Wt 181 lb 12.8 oz (82.5 kg)   SpO2 96%   BMI 33.79 kg/m²      Physical Examination:     GENERAL APPEARANCE: in no acute distress, well developed, well nourished. HEAD: normocephalic, atraumatic. EYES: extraocular movement intact (EOMI), pupils equal, round, reactive to light and accommodation. EARS: normal, tympanic membrane intact, clear, auditory canal clear. NOSE: nares patent, no erythema, sinuses nontender bilaterally, no rhinorrhea. ORAL CAVITY: mucosa moist, no lesions. THROAT: clear, no mass, no exudate. NECK/THYROID: neck supple, full range of motion, no thyromegaly. HEART: no murmurs, regular rate and rhythm, S1, S2 normal.   LUNGS: clear to auscultation bilaterally, no wheezes, rales, rhonchi.    ABDOMEN: normal, bowel sounds present, soft, nontender, nondistended, no rebound guarding or rigidity  Musculoskeletal:   Hip: Inspection: no apparent swelling, ecchymosis         Palpation: no bone, groin or bursa tenderness          Range of Motion: Full without pain          Strength: 5/5 equal bilaterally; Piriformis: +tender to palpation on the right         Gait: no pain with weight bearing, able to toe and heel walk with good strength  Reflexes: 2/4      Recent Labs/ In Office Testing/ Radiograph review:     Hospital Outpatient Visit on 02/13/2019   Component Date Value Ref Range Status    WBC 02/13/2019 6.6  3.5 - 11.3 k/uL Final    RBC 02/13/2019 4.62  3.95 - 5.11 m/uL Final    Hemoglobin 02/13/2019 14.5  11.9 - 15.1 g/dL Final    Hematocrit 02/13/2019 46.9  36.3 - 47.1 % Final    MCV 02/13/2019 101.5  82.6 - 102.9 fL Final    MCH 02/13/2019 31.4  25.2 - 33.5 pg Final    MCHC 02/13/2019 30.9  28.4 - 34.8 g/dL Final    RDW 02/13/2019 13.3  11.8 - 14.4 % Final    Platelets 62/75/2558 272  138 - 453 k/uL Final    MPV 02/13/2019 11.7  8.1 - 13.5 fL Final    NRBC Automated 02/13/2019 0.0  0.0 per 100 WBC Final    Differential Type 02/13/2019 NOT REPORTED   Final    Seg Neutrophils 02/13/2019 48  36 - 65 % Final    Lymphocytes 02/13/2019 39  24 - 43 % Final    Monocytes 02/13/2019 10  3 - 12 % Final    Eosinophils % 02/13/2019 2  1 - 4 % Final    Basophils 02/13/2019 1  0 - 2 % Final    Immature Granulocytes 02/13/2019 0  0 % Final    Segs Absolute 02/13/2019 3.25  1.50 - 8.10 k/uL Final    Absolute Lymph # 02/13/2019 2.54  1.10 - 3.70 k/uL Final    Absolute Mono # 02/13/2019 0.64  0.10 - 1.20 k/uL Final  Absolute Eos # 02/13/2019 0.10  0.00 - 0.44 k/uL Final    Basophils Absolute 02/13/2019 0.05  0.00 - 0.20 k/uL Final    Absolute Immature Granulocyte 02/13/2019 <0.03  0.00 - 0.30 k/uL Final    WBC Morphology 02/13/2019 NOT REPORTED   Final    RBC Morphology 02/13/2019 NOT REPORTED   Final    Platelet Estimate 22/18/8052 NOT REPORTED   Final    Glucose 02/13/2019 91  70 - 99 mg/dL Final    BUN 02/13/2019 26* 8 - 23 mg/dL Final    CREATININE 02/13/2019 0.65  0.50 - 0.90 mg/dL Final    Bun/Cre Ratio 02/13/2019 NOT REPORTED  9 - 20 Final    Calcium 02/13/2019 9.6  8.6 - 10.4 mg/dL Final    Sodium 02/13/2019 146* 135 - 144 mmol/L Final    Potassium 02/13/2019 5.1  3.7 - 5.3 mmol/L Final    Chloride 02/13/2019 109* 98 - 107 mmol/L Final    CO2 02/13/2019 26  20 - 31 mmol/L Final    Anion Gap 02/13/2019 11  9 - 17 mmol/L Final    Alkaline Phosphatase 02/13/2019 26* 35 - 104 U/L Final    ALT 02/13/2019 16  5 - 33 U/L Final    AST 02/13/2019 18  <32 U/L Final    Total Bilirubin 02/13/2019 0.55  0.3 - 1.2 mg/dL Final    Total Protein 02/13/2019 7.0  6.4 - 8.3 g/dL Final    Alb 02/13/2019 4.1  3.5 - 5.2 g/dL Final    Albumin/Globulin Ratio 02/13/2019 1.4  1.0 - 2.5 Final    GFR Non- 02/13/2019 >60  >60 mL/min Final    GFR  02/13/2019 >60  >60 mL/min Final    GFR Comment 02/13/2019        Final    Comment: Average GFR for 61-76 years old:   80 mL/min/1.73sq m  Chronic Kidney Disease:   <60 mL/min/1.73sq m  Kidney failure:   <15 mL/min/1.73sq m              eGFR calculated using average adult body mass.  Additional eGFR calculator available at:        [employer]            GFR Staging 02/13/2019 NOT REPORTED   Final    Cholesterol 02/13/2019 187  <200 mg/dL Final    Comment:    Cholesterol Guidelines:      <200  Desirable   200-240  Borderline      >240  Undesirable         HDL 02/13/2019 65  >40 mg/dL Final    Comment: HDL Guidelines:    <40     Undesirable   40-59    Borderline    >59     Desirable         LDL Cholesterol 02/13/2019 98  0 - 130 mg/dL Final    Comment:    LDL Guidelines:     <100    Desirable   100-129   Near to/above Desirable   130-159   Borderline      >159   Undesirable     Direct (measured) LDL and calculated LDL are not interchangeable tests.  Chol/HDL Ratio 02/13/2019 2.9  <5 Final            Triglycerides 02/13/2019 122  <150 mg/dL Final    Comment:    Triglyceride Guidelines:     <150   Desirable   150-199  Borderline   200-499  High     >499   Very high   Based on AHA Guidelines for fasting triglyceride, October 2012.  VLDL 02/13/2019 NOT REPORTED  1 - 30 mg/dL Final       No results found for this visit on 12/17/20. Assessment/Plan:     Keaton was seen today for pain. Diagnoses and all orders for this visit:    Piriformis syndrome of right side  -     methylPREDNISolone acetate (DEPO-MEDROL) injection 80 mg  -     predniSONE (DELTASONE) 20 MG tablet; Take 1 tablet by mouth 2 times daily for 5 days  -     cyclobenzaprine (FLEXERIL) 10 MG tablet; Take 1 tablet by mouth nightly as needed for Muscle spasms    Buttock pain  -     XR HIP BILATERAL W AP PELVIS (2 VIEWS); Future  -     predniSONE (DELTASONE) 20 MG tablet; Take 1 tablet by mouth 2 times daily for 5 days  -     cyclobenzaprine (FLEXERIL) 10 MG tablet; Take 1 tablet by mouth nightly as needed for Muscle spasms    RX as above. Follow up on imaging. RTC precautions provided. All questions answered and addressed to patient satisfaction. Patient understands and agrees to the plan. The patient was evaluated and treated today based on the osteopathic principle that each person is a unit of body, mind, and spirit, the body is capable of self-regulation, self-healing, and health maintenance and that structure and function are reciprocally interrelated.      Follow-up: Return in about 6 weeks (around 1/28/2021) for AMV; 40 min appt.       Artem Voss D.O.

## 2020-12-26 ENCOUNTER — HOSPITAL ENCOUNTER (OUTPATIENT)
Dept: MAMMOGRAPHY | Age: 65
Discharge: HOME | End: 2020-12-28
Payer: MEDICARE

## 2020-12-26 PROCEDURE — 77063 BREAST TOMOSYNTHESIS BI: CPT

## 2020-12-28 ENCOUNTER — HOSPITAL ENCOUNTER (OUTPATIENT)
Age: 65
Discharge: HOME | End: 2020-12-30
Payer: MEDICARE

## 2020-12-28 ENCOUNTER — HOSPITAL ENCOUNTER (OUTPATIENT)
Dept: GENERAL RADIOLOGY | Age: 65
Discharge: HOME | End: 2020-12-30
Payer: MEDICARE

## 2020-12-28 PROCEDURE — 73521 X-RAY EXAM HIPS BI 2 VIEWS: CPT

## 2021-02-12 ENCOUNTER — OFFICE VISIT (OUTPATIENT)
Dept: FAMILY MEDICINE CLINIC | Age: 66
End: 2021-02-12
Payer: MEDICARE

## 2021-02-12 VITALS
DIASTOLIC BLOOD PRESSURE: 83 MMHG | HEART RATE: 92 BPM | WEIGHT: 186 LBS | OXYGEN SATURATION: 97 % | SYSTOLIC BLOOD PRESSURE: 130 MMHG | BODY MASS INDEX: 34.58 KG/M2 | TEMPERATURE: 97.2 F

## 2021-02-12 DIAGNOSIS — E78.5 DYSLIPIDEMIA: ICD-10-CM

## 2021-02-12 DIAGNOSIS — C54.1 ENDOMETRIAL CANCER DETERMINED BY UTERINE BIOPSY (HCC): ICD-10-CM

## 2021-02-12 DIAGNOSIS — Z71.82 EXERCISE COUNSELING: ICD-10-CM

## 2021-02-12 DIAGNOSIS — Z00.00 ROUTINE GENERAL MEDICAL EXAMINATION AT A HEALTH CARE FACILITY: Primary | ICD-10-CM

## 2021-02-12 DIAGNOSIS — G62.0 DRUG-INDUCED POLYNEUROPATHY (HCC): ICD-10-CM

## 2021-02-12 DIAGNOSIS — Z13.89 MULTIPHASIC SCREENING: ICD-10-CM

## 2021-02-12 DIAGNOSIS — Z71.3 DIETARY COUNSELING: ICD-10-CM

## 2021-02-12 DIAGNOSIS — N39.41 URGE INCONTINENCE: ICD-10-CM

## 2021-02-12 DIAGNOSIS — R73.9 HYPERGLYCEMIA: ICD-10-CM

## 2021-02-12 DIAGNOSIS — E55.9 VITAMIN D DEFICIENCY: ICD-10-CM

## 2021-02-12 PROCEDURE — 99214 OFFICE O/P EST MOD 30 MIN: CPT | Performed by: FAMILY MEDICINE

## 2021-02-12 PROCEDURE — G0438 PPPS, INITIAL VISIT: HCPCS | Performed by: FAMILY MEDICINE

## 2021-02-12 PROCEDURE — 1123F ACP DISCUSS/DSCN MKR DOCD: CPT | Performed by: FAMILY MEDICINE

## 2021-02-12 PROCEDURE — 4040F PNEUMOC VAC/ADMIN/RCVD: CPT | Performed by: FAMILY MEDICINE

## 2021-02-12 PROCEDURE — 3017F COLORECTAL CA SCREEN DOC REV: CPT | Performed by: FAMILY MEDICINE

## 2021-02-12 PROCEDURE — G8417 CALC BMI ABV UP PARAM F/U: HCPCS | Performed by: FAMILY MEDICINE

## 2021-02-12 PROCEDURE — G8484 FLU IMMUNIZE NO ADMIN: HCPCS | Performed by: FAMILY MEDICINE

## 2021-02-12 ASSESSMENT — PATIENT HEALTH QUESTIONNAIRE - PHQ9
SUM OF ALL RESPONSES TO PHQ QUESTIONS 1-9: 1
SUM OF ALL RESPONSES TO PHQ9 QUESTIONS 1 & 2: 1
1. LITTLE INTEREST OR PLEASURE IN DOING THINGS: 0

## 2021-02-12 ASSESSMENT — LIFESTYLE VARIABLES
HOW OFTEN DO YOU HAVE A DRINK CONTAINING ALCOHOL: 1
HAVE YOU OR SOMEONE ELSE BEEN INJURED AS A RESULT OF YOUR DRINKING: 0
HOW OFTEN DURING THE LAST YEAR HAVE YOU FOUND THAT YOU WERE NOT ABLE TO STOP DRINKING ONCE YOU HAD STARTED: 0
HOW OFTEN DURING THE LAST YEAR HAVE YOU BEEN UNABLE TO REMEMBER WHAT HAPPENED THE NIGHT BEFORE BECAUSE YOU HAD BEEN DRINKING: 0
AUDIT-C TOTAL SCORE: 1
HOW OFTEN DURING THE LAST YEAR HAVE YOU FAILED TO DO WHAT WAS NORMALLY EXPECTED FROM YOU BECAUSE OF DRINKING: 0
AUDIT TOTAL SCORE: 1

## 2021-02-12 NOTE — LETTER
[facility]  [address] [facility] [address]  Phone: [phone]  Fax: [phone]    Stephanie Bullock DO        February 12, 2021     Patient: Briana Reece   YOB: 1955   Date of Visit: 2/12/2021       To Whom It May Concern: It is my medical opinion that Favian Guzmán needs a stool for breaks at work. Patient should use this stool at least for 15 minuntes every hour at work. Dx: osteoarthritis    If you have any questions or concerns, please don't hesitate to call.     Sincerely,          Stephanie Bullock DO

## 2021-02-12 NOTE — PATIENT INSTRUCTIONS
A living will, also called a declaration, is a legal form. It tells your family and your doctor your wishes when you can't speak for yourself. It's used by the health professionals who will treat you as you near the end of your life or if you get seriously hurt or ill. If you put your wishes in writing, your loved ones and others will know what kind of care you want. They won't need to guess. This can ease your mind and be helpful to others. And you can change or cancel your living will at any time. A living will is not the same as an estate or property will. An estate will explains what you want to happen with your money and property after you die. How do you use it? A living will is used to describe the kinds of treatment or life support you want as you near the end of your life or if you get seriously hurt or ill. Keep these facts in mind about living kiran. Your living will is used only if you can't speak or make decisions for yourself. Most often, one or more doctors must certify that you can't speak or decide for yourself before your living will takes effect. If you get better and can speak for yourself again, you can accept or refuse any treatment. It doesn't matter what you said in your living will. Some states may limit your right to refuse treatment in certain cases. For example, you may need to clearly state in your living will that you don't want artificial hydration and nutrition, such as being fed through a tube. Is a living will a legal document? A living will is a legal document. Each state has its own laws about living kiran. And a living will may be called something else in your state. Here are some things to know about living kiran. You don't need an  to complete a living will. But legal advice can be helpful if your state's laws are unclear. It can also help if your health history is complicated or your family can't agree on what should be in your living will. Make sure that your family members and your health care agent have copies of your living will (also called a declaration). Give your doctor a copy of your living will. Ask him or her to keep it as part of your medical record. If you have more than one doctor, make sure that each one has a copy. Put a copy of your living will where it can be easily found. For example, some people may put a copy on their refrigerator door. If you are using a digital copy, be sure your doctor, family members, and health care agent know how to find and access it. Where can you learn more? Go to https://chpepiceweb.[employer]. org and sign in to your Crocs account. Enter X597 in the ObsEva box to learn more about \"Learning About Living Perroy. \"     If you do not have an account, please click on the \"Sign Up Now\" link. Current as of: December 9, 2019               Content Version: 12.6  © 3547-7857 [employer]. Care instructions adapted under license by [facility] ([facility]). If you have questions about a medical condition or this instruction, always ask your healthcare professional. [place] [address] any warranty or liability for your use of this information. Patient Education        Advance Directives: Care Instructions  Overview  An advance directive is a legal way to state your wishes at the end of your life. It tells your family and your doctor what to do if you can't say what you want. There are two main types of advance directives. You can change them any time your wishes change. Living will. This form tells your family and your doctor your wishes about life support and other treatment. The form is also called a declaration. Medical power of . This form lets you name a person to make treatment decisions for you when you can't speak for yourself. This person is called a health care agent (health care proxy, health care surrogate). The form is also called a durable power of  for health care. If you do not have an advance directive, decisions about your medical care may be made by a family member, or by a doctor or a  who doesn't know you. It may help to think of an advance directive as a gift to the people who care for you. If you have one, they won't have to make tough decisions by themselves. Follow-up care is a key part of your treatment and safety. Be sure to make and go to all appointments, and call your doctor if you are having problems. It's also a good idea to know your test results and keep a list of the medicines you take. What should you include in an advance directive? Many states have a unique advance directive form. (It may ask you to address specific issues.) Or you might use a universal form that's approved by many states. If your form doesn't tell you what to address, it may be hard to know what to include in your advance directive. Use the questions below to help you get started. Who do you want to make decisions about your medical care if you are not able to? What life-support measures do you want if you have a serious illness that gets worse over time or can't be cured? What are you most afraid of that might happen? (Maybe you're afraid of having pain, losing your independence, or being kept alive by machines.)  Where would you prefer to die? (Your home? A hospital? A nursing home?)  Do you want to donate your organs when you die? Do you want certain Church practices performed before you die? When should you call for help? Be sure to contact your doctor if you have any questions. Where can you learn more? Go to https://chpepiceweb.healthSmart Hydro Power. org and sign in to your [employer]ce account. Enter R264 in the eVendor Checkhire box to learn more about \"Advance Directives: Care Instructions. \"     If you do not have an account, please click on the \"Sign Up Now\" link. Current as of: December 9, 2019               Content Version: 12.6  © 2116-7716 Splendia. Care instructions adapted under license by [facility] ([facility]). If you have questions about a medical condition or this instruction, always ask your healthcare professional. [address] any warranty or liability for your use of this information. Learning About Obesity  What is obesity? Obesity means having a body mass index (BMI) of 30 or above. BMI is a number that is calculated from your weight and your height. How do you know if your weight is in the obesity range? To know if your weight is in the obesity range, your doctor looks at your body mass index (BMI) and waist size. BMI is a number that is calculated from your weight and your height. To figure out your BMI for yourself, you can use an online tool, such as http://www.severino.com/ on the [employer]. If your BMI is 30.0 or higher, it falls within the obesity range. Keep in mind that BMI and waist size are only guides. They are not tools to determine your ideal body weight. What causes obesity? When you take in more calories than you burn off, you gain weight. How you eat, how active you are, and other things affect how your body uses calories and whether you gain weight. If you have family members who have too much body fat, you may have inherited a tendency to gain weight. And your family also helps form your eating and lifestyle habits, which can lead to obesity. Also, our busy lives make it harder to plan and cook healthy meals. For many of us, it's easier to reach for prepared foods, go out to eat, or go to the drive-through. But these foods are often high in saturated fat and calories. Portions are often too large. What can you do to reach a healthy weight? Focus on health, not diets. Diets are hard to stay on and don't work in the long run. It is very hard to stay with a diet that includes lots of big changes in your eating habits. Instead of a diet, focus on lifestyle changes that will improve your health and achieve the right balance of energy and calories. To lose weight, you need to burn more calories than you take in. You can do it by eating healthy foods in reasonable amounts and becoming more active, even a little bit every day. Making small changes over time can add up to a lot. Make a plan for change. Many people have found that naming their reasons for change and staying focused on their plan can make a big difference. Work with your doctor to create a plan that is right for you. Ask yourself: Jon Mems are my personal, most powerful reasons for wanting this change? What will my life look like when I've made the change? \"  Set your long-term goal. Make it specific, such as \"I will lose x pounds. \"  Break your long-term goal into smaller, short-term goals. Make these small steps specific and within your reach, things you know you can do. These steps are what keep you going from day to day. Talk with your doctor about other weight-loss options. If you have a BMI in a certain range and have not been able to lose weight with diet and exercise, medicine or surgery may be an option for you. Before your doctor will prescribe medicines or surgery, he or she will probably want you to be more active and follow your healthy eating plan for a period of time. These habits are key lifelong changes for managing your weight, with or without other medical treatment. And these changes can help you avoid weight-related health problems. How can you stay on your plan for change? Be ready. Choose to start during a time when there are few events that might trigger slip-ups, like holidays, social events, and high-stress periods. Decide on your first few steps. Most people have more success when they make small changes, one step at a time. For example, you might switch a daily candy bar to a piece of fruit, walk 10 minutes more, or add more vegetables to a meal.  Line up your support people. Make sure you're not going to be alone as you make this change. Connect with people who understand how important it is to you. Ask family members and friends for help in keeping with your plan. And think about who could make it harder for you, and how to handle them. Try tracking. People who keep track of what they eat, feel, and do are better at losing weight. Try writing down things like:  What and how much you eat. How you feel before and after each meal.  Details about each meal (like eating out or at home, eating alone, or with friends or family). What you do to be active. Look and plan. As you track, look for patterns that you may want to change. Take note of: When you eat and whether you skip meals. How often you eat out. How many fruits and vegetables you eat. When you eat beyond feeling full. When and why you eat for reasons other than being hungry. When you stray from your plan, don't get upset. Figure out what made you slip up and how you can fix it. Can you take medicines or have surgery to lose weight? If you have a BMI in a certain range and have not been able to lose weight with diet and exercise, medicine or surgery may be an option for you. If you have a BMI of at least 30.0 (or a BMI of at least 27.0 and another health problem related to your weight), ask your doctor about weight-loss medicines. They work by making you feel less hungry, making you feel full more quickly, or changing how you digest fat. Medicines are used along with diet changes and more physical activity to help you make lasting changes. If you have a BMI of 40.0 or more (or a BMI of 35.0 or more and another health problem related to your weight), your doctor may talk with you about surgery. Weight-loss surgery has risks, and you will need to work with your doctor to compare the risk of having obesity with the risks of surgery. With any option you choose, you will still need to eat a healthy diet and get regular exercise. Follow-up care is a key part of your treatment and safety. Be sure to make and go to all appointments, and call your doctor if you are having problems. It's also a good idea to know your test results and keep a list of the medicines you take. Where can you learn more? Go to [email or web address]."[employer]ove[employer]. org and sign in to your Minutta account. Enter N111 in the [facility] box to learn more about \"Learning About Obesity. \"     If you do not have an account, please click on the \"Sign Up Now\" link. Current as of: December 11, 2019               Content Version: 12.6  © 0619-7121 [employer]. Care instructions adapted under license by [facility] ([facility]). If you have questions about a medical condition or this instruction, always ask your healthcare professional. [address] any warranty or liability for your use of this information.

## 2021-02-12 NOTE — PROGRESS NOTES
Medicare Annual Wellness Visit  Name: Lance Do Date: 2021   MRN: [id number] Sex: Female   Age: 74 y.o. Ethnicity: Non-/Non    : 1955 Race: Sophie Perez is here for Medicare AWV and Hyperlipidemia    Screenings for behavioral, psychosocial and functional/safety risks, and cognitive dysfunction are all negative except as indicated below. These results, as well as other patient data from the [address] form, are documented in Flowsheets linked to this Encounter. Patient is here for AMV and follow up on chronic conditions. Patient PMH GERD, dyslipidemia, endometrial cancer, and obesity. Patient PSH DAWSON/BSO, appendectomy, hernia repair, small intestine surgery, and tonsillectomy. Patient fhx mom and dad alzheimer's disease. Patient is a former smoker. Patient denies regular EtOH consumption. Patient denies SIG E CAPS. Patient denies SI/HI. Patient denies anxiety. Patient denies specific diet. Patient states she does walk a lot at [employer] at her job. Patient denies regular aerobic activity. Patient declines flu shot. She would like to sign up for COVID-19 vaccination. Patient states she does follow with oncology for follow up of her endometrial cancer. Patient will think about her options for colon cancer screening. Patient states she had a normal colonoscopy many years ago. Patient had normal mammogram 2020. Patient follows with GYN. Patient had DAWSON. She does have a pelvic exam twice yearly with GYN. Patient states she had a DEXA at [facility].    PHQ-9 Total Score: 1 (2021  3:25 PM)    Patient reports urge incontinence. Patient states this has been occurring for a few months. Patient denies blood in urine. Patient denies foul odor to her urine. Patient denies f/c/n/v/d. Patient denies flank pain. Patient denies rash. Patient reports hard stools which are occasionally difficult to release.  Patient denies blood in stool or dark tarry stools. No Known Allergies      Prior to Visit Medications    Medication Sig Taking?  Authorizing Provider   mirabegron (MYRBETRIQ) 25 MG TB24 Take 1 tablet by mouth daily Yes Alaina Vincent DO   ibuprofen (ADVIL;MOTRIN) 800 MG tablet TAKE 1 TABLET EVERY 8 HOURS AS NEEDED FOR PAIN Yes Dc Wellington MD   atorvastatin (LIPITOR) 10 MG tablet Take 1 tablet by mouth daily Yes Conor Floyd MD   ranitidine (ZANTAC) 150 MG tablet TAKE 1 TABLET TWICE A DAY Yes Dc Wellington MD   Multiple Vitamins-Minerals (THERAPEUTIC MULTIVITAMIN-MINERALS) tablet Take 1 tablet by mouth daily Yes Historical Provider, MD   vitamin B-12 (CYANOCOBALAMIN) 1000 MCG tablet Take 1,000 mcg by mouth daily Yes Historical Provider, MD         Past Medical History:   Diagnosis Date    Endometrial cancer ([address]     Hyperlipidemia     Hypertension     Umbilical hernia        Past Surgical History:   Procedure Laterality Date    ABDOMINAL EXPLORATION SURGERY  10/19/2016    small bowel resection    APPENDECTOMY  10/19/2016    [facility]    ENDOMETRIAL BIOPSY  09/12/146    high grade carcinoma-ref to     HYSTERECTOMY      SMALL INTESTINE SURGERY      DAWSON AND BSO  10/19/2016    with pelvic and aorta lymph node dissection, omenectomy    TONSILLECTOMY      UMBILICAL HERNIA REPAIR  2010    UPPER GASTROINTESTINAL ENDOSCOPY  2011         Family History   Problem Relation Age of Onset    Alzheimer's Disease Mother     Dementia Father     Alcohol Abuse Father     Alcohol Abuse Paternal Grandfather        CareTeam (Including outside providers/suppliers regularly involved in providing care):   Patient Care Team:  Alaina Vincent DO as PCP - General (Family Medicine)  Alaina Vincent DO as [facility] - [facility] Empaneled Provider  John Latham DO as Consulting Physician (General Surgery)  Nyasia Villalobos RN as   Vaughn Arnett MD as Consulting Physician (Obstetrics & Gynecology)  [facility], PA-C as Physician Assistant (Physician Assistant)  Karla Conway MD as Consulting Physician (Hematology and Oncology)    [facility] Readings from Last 3 Encounters:   02/12/21 186 lb (84.4 kg)   12/17/20 181 lb 12.8 oz (82.5 kg)   09/16/20 184 lb 9.6 oz (83.7 kg)     Vitals:    02/12/21 1525   BP: 130/83   Pulse: 92   Temp: 97.2 °F (36.2 °C)   TempSrc: Temporal   SpO2: 97%   Weight: 186 lb (84.4 kg)     Body mass index is 34.58 kg/m². Based upon direct observation of the patient, evaluation of cognition reveals recent and remote memory intact. ROS:    Constitutional: No fevers, chills, fatigue. ENT: No nasal congestion or sore throat  Respiratory: No difficulty in breathing or cough. Cardiovascular: No chest pain, palpitations or shortness of breath  Gastrointestinal: No abdominal pain; +hard stools  Genitourinary: no dysuria; +urinary incontinence  Skin: No rashes or skin lesions. Neurological: No weakness. No headaches. PE:    GENERAL APPEARANCE: in no acute distress, well developed, well nourished. HEAD: normocephalic, atraumatic. EYES: extraocular movement intact (EOMI), pupils equal, round, reactive to light and accommodation. EARS: normal, tympanic membrane intact, clear, auditory canal clear. NOSE: nares patent, no erythema, sinuses nontender bilaterally, no rhinorrhea. ORAL CAVITY: mucosa moist, no lesions. THROAT: clear, no mass, no exudate. NECK/THYROID: neck supple, full range of motion, no thyromegaly. HEART: no murmurs, regular rate and rhythm, S1, S2 normal.   LUNGS: clear to auscultation bilaterally, no wheezes, rales, rhonchi. ABDOMEN: normal, bowel sounds present, soft, nontender, nondistended, no rebound guarding or rigidity      Patient's complete Health Risk Assessment and screening values have been reviewed and are found in Flowsheets. The following problems were reviewed today and where indicated follow up appointments were made and/or referrals ordered.     Positive Risk Factor Screenings with Interventions:            General Health and ACP:  General  In general, how would you say your health is?: Good  In the past 7 days, have you experienced any of the following?  New or Increased Pain, New or Increased Fatigue, Loneliness, Social Isolation, Stress or Anger?: (!) Stress  Do you get the social and emotional support that you need?: Yes  Do you have a Living Will?: (!) No  Advance Directives     Power of [facility] Will ACP-Advance Directive ACP-Power of     Not on File Not on File Not on File Not on File      General Health Risk Interventions:  · Stress: regular exercise recommended- 3-5 times per week, 30-45 minutes per session   · Living will information provided in AVS    Health Habits/Nutrition:  Health Habits/Nutrition  Do you exercise for at least 20 minutes 2-3 times per week?: (!) No  Have you lost any weight without trying in the past 3 months?: No  Do you eat only one meal per day?: No  Have you seen the dentist within the past year?: Yes     Health Habits/Nutrition Interventions:  · Inadequate physical activity:  patient is not ready to increase his/her physical activity level at this time    Hearing/Vision:  No exam data present  Hearing/Vision  Do you or your family notice any trouble with your hearing that hasn't been managed with hearing aids?: (!) Yes  Do you have difficulty driving, watching TV, or doing any of your daily activities because of your eyesight?: No  Have you had an eye exam within the past year?: Yes  Hearing/Vision Interventions:  · Hearing concerns:  encouraged formal hearing exam    Safety:  Safety  Do you have working smoke detectors?: Yes  Have all throw rugs been removed or fastened?: (!) No  Do you have non-slip mats or surfaces in all bathtubs/showers?: (!) No  Do all of your stairways have a railing or banister?: (!) No  Are your doorways, halls and stairs free of clutter?: Yes  Do you always fasten your seatbelt when you are in a car?: Yes  Safety Interventions:  · discussed optimizing home to decrease fall risk     Personalized Preventive Plan   Current Health Maintenance Status  Immunization History   Administered Date(s) Administered    Tdap (Boostrix, Adacel) 03/29/2016        Health Maintenance   Topic Date Due    Hepatitis C screen  1955    COVID-19 Vaccine (1 of 2) 01/18/1969    Shingles Vaccine (1 of 2) 01/18/2003    DEXA (modify frequency per FRAX score)  01/18/2008    Annual Wellness Visit (AWV)  05/29/2019    Lipid screen  02/13/2020    Flu vaccine (1) 09/01/2020    Colon cancer screen colonoscopy  02/01/2021    Pneumococcal 65+ years Vaccine (1 of 1 - PPSV23) 05/31/2021 (Originally 1/18/2018)    Breast cancer screen  12/26/2022    DTaP/Tdap/Td vaccine (2 - Td) 03/29/2026    Hepatitis A vaccine  Aged Out    Hepatitis B vaccine  Aged Out    Hib vaccine  Aged Out    Meningococcal (ACWY) vaccine  Aged Out     Recommendations for [employer] Due: see orders and patient instructions/AVS.  . Recommended screening schedule for the next 5-10 years is provided to the patient in written form: see Patient Instructions/AVS.    Sigrid Laguna was seen today for medicare awv and hyperlipidemia. Diagnoses and all orders for this visit:    Routine general medical examination at a health care facility    BMI 34.0-34.9,adult    Exercise counseling    Dietary counseling  -      BMI ABOVE NORMAL F/U    Dyslipidemia  -     ALT; Future  -     AST; Future  -     CBC Auto Differential; Future  -     Lipid Panel; Future  -     Magnesium; Future  -     Renal Function Panel; Future  -     TSH with Reflex; Future    Hyperglycemia  -     Hemoglobin A1C; Future    Vitamin D deficiency  -     Vitamin D 25 Hydroxy; Future    Multiphasic screening  -     Hepatitis C Antibody; Future    Urge incontinence  -     Urinalysis with Microscopic; Future  -     Culture, Urine;  Future  -     mirabegron (MYRBETRIQ) 25 MG TB24; Take 1 tablet by mouth daily    Drug-induced polyneuropathy ([facility] [address]    Endometrial cancer determined by uterine biopsy ([facility] [address]            Follow up on labs. Encouraged well balanced diet. Encouraged 150 mins of aerobic activity weekly. Trial of myrbetriq. R/O UTI. Recommended 50-60 oz of water per day, probiotic daily, and daily fiber supplement for hard stools. Patient follows with GYN and oncology for her management and surveillance of her previous endometrial cancer. Alta Aragon DO    Return in about 6 months (around 8/12/2021) for chol; 20 min appt. BMI was elevated today, and weight loss plan recommended is : conventional weight loss.

## 2021-02-15 ENCOUNTER — HOSPITAL ENCOUNTER (OUTPATIENT)
Age: 66
Setting detail: SPECIMEN
Discharge: HOME | End: 2021-02-15
Payer: MEDICARE

## 2021-02-15 DIAGNOSIS — N39.41 URGE INCONTINENCE: ICD-10-CM

## 2021-02-15 DIAGNOSIS — R73.9 HYPERGLYCEMIA: ICD-10-CM

## 2021-02-15 DIAGNOSIS — E78.5 DYSLIPIDEMIA: ICD-10-CM

## 2021-02-15 DIAGNOSIS — E55.9 VITAMIN D DEFICIENCY: ICD-10-CM

## 2021-02-15 DIAGNOSIS — Z13.89 MULTIPHASIC SCREENING: ICD-10-CM

## 2021-02-15 LAB
-: ABNORMAL
ABSOLUTE EOS #: 0.13 K/UL (ref 0–0.44)
ABSOLUTE IMMATURE GRANULOCYTE: 0.03 K/UL (ref 0–0.3)
ABSOLUTE LYMPH #: 1.83 K/UL (ref 1.1–3.7)
ABSOLUTE MONO #: 0.7 K/UL (ref 0.1–1.2)
ALBUMIN SERPL-MCNC: 3.9 G/DL (ref 3.5–5.2)
ALT SERPL-CCNC: 12 U/L (ref 5–33)
AMORPHOUS: ABNORMAL
ANION GAP SERPL CALCULATED.3IONS-SCNC: 13 MMOL/L (ref 9–17)
AST SERPL-CCNC: 18 U/L
BACTERIA: ABNORMAL
BASOPHILS # BLD: 1 % (ref 0–2)
BASOPHILS ABSOLUTE: 0.05 K/UL (ref 0–0.2)
BILIRUBIN URINE: NEGATIVE
BUN BLDV-MCNC: 28 MG/DL (ref 8–23)
BUN/CREAT BLD: ABNORMAL (ref 9–20)
CALCIUM SERPL-MCNC: 9.4 MG/DL (ref 8.6–10.4)
CASTS UA: ABNORMAL /LPF (ref 0–2)
CHLORIDE BLD-SCNC: 109 MMOL/L (ref 98–107)
CHOLESTEROL/HDL RATIO: 2.8
CHOLESTEROL: 189 MG/DL
CO2: 23 MMOL/L (ref 20–31)
COLOR: YELLOW
CREAT SERPL-MCNC: 0.63 MG/DL (ref 0.5–0.9)
CRYSTALS, UA: ABNORMAL /HPF
CRYSTALS, UA: ABNORMAL /HPF
DIFFERENTIAL TYPE: ABNORMAL
EOSINOPHILS RELATIVE PERCENT: 2 % (ref 1–4)
EPITHELIAL CELLS UA: ABNORMAL /HPF (ref 0–5)
ESTIMATED AVERAGE GLUCOSE: 105 MG/DL
GFR AFRICAN AMERICAN: >60 ML/MIN
GFR NON-AFRICAN AMERICAN: >60 ML/MIN
GFR SERPL CREATININE-BSD FRML MDRD: ABNORMAL ML/MIN/{1.73_M2}
GFR SERPL CREATININE-BSD FRML MDRD: ABNORMAL ML/MIN/{1.73_M2}
GLUCOSE BLD-MCNC: 75 MG/DL (ref 70–99)
GLUCOSE URINE: NEGATIVE
HBA1C MFR BLD: 5.3 % (ref 4–6)
HCT VFR BLD CALC: 43 % (ref 36.3–47.1)
HDLC SERPL-MCNC: 67 MG/DL
HEMOGLOBIN: 13.1 G/DL (ref 11.9–15.1)
HEPATITIS C ANTIBODY: NONREACTIVE
IMMATURE GRANULOCYTES: 0 %
KETONES, URINE: NEGATIVE
LDL CHOLESTEROL: 98 MG/DL (ref 0–130)
LEUKOCYTE ESTERASE, URINE: ABNORMAL
LYMPHOCYTES # BLD: 23 % (ref 24–43)
MAGNESIUM: 2.2 MG/DL (ref 1.6–2.6)
MCH RBC QN AUTO: 29.8 PG (ref 25.2–33.5)
MCHC RBC AUTO-ENTMCNC: 30.5 G/DL (ref 28.4–34.8)
MCV RBC AUTO: 97.7 FL (ref 82.6–102.9)
MONOCYTES # BLD: 9 % (ref 3–12)
MUCUS: ABNORMAL
NITRITE, URINE: NEGATIVE
NRBC AUTOMATED: 0 PER 100 WBC
OTHER OBSERVATIONS UA: ABNORMAL
PDW BLD-RTO: 14 % (ref 11.8–14.4)
PH UA: 5.5 (ref 5–8)
PHOSPHORUS: 3.9 MG/DL (ref 2.6–4.5)
PLATELET # BLD: 278 K/UL (ref 138–453)
PLATELET ESTIMATE: ABNORMAL
PMV BLD AUTO: 11.6 FL (ref 8.1–13.5)
POTASSIUM SERPL-SCNC: 4.1 MMOL/L (ref 3.7–5.3)
PROTEIN UA: NEGATIVE
RBC # BLD: 4.4 M/UL (ref 3.95–5.11)
RBC # BLD: ABNORMAL 10*6/UL
RBC UA: ABNORMAL /HPF (ref 0–2)
RENAL EPITHELIAL, UA: ABNORMAL /HPF
SEG NEUTROPHILS: 65 % (ref 36–65)
SEGMENTED NEUTROPHILS ABSOLUTE COUNT: 5.41 K/UL (ref 1.5–8.1)
SODIUM BLD-SCNC: 145 MMOL/L (ref 135–144)
SPECIFIC GRAVITY UA: 1.03 (ref 1–1.03)
TRICHOMONAS: ABNORMAL
TRIGL SERPL-MCNC: 118 MG/DL
TSH SERPL DL<=0.05 MIU/L-ACNC: 1.23 MIU/L (ref 0.3–5)
TURBIDITY: ABNORMAL
URINE HGB: ABNORMAL
UROBILINOGEN, URINE: NORMAL
VITAMIN D 25-HYDROXY: 36.3 NG/ML (ref 30–100)
VLDLC SERPL CALC-MCNC: NORMAL MG/DL (ref 1–30)
WBC # BLD: 8.2 K/UL (ref 3.5–11.3)
WBC # BLD: ABNORMAL 10*3/UL
WBC UA: ABNORMAL /HPF (ref 0–5)
YEAST: ABNORMAL

## 2021-02-16 DIAGNOSIS — R31.29 OTHER MICROSCOPIC HEMATURIA: Primary | ICD-10-CM

## 2021-02-16 LAB
CULTURE: NORMAL
Lab: NORMAL
SPECIMEN DESCRIPTION: NORMAL

## 2021-03-10 ENCOUNTER — TELEPHONE (OUTPATIENT)
Dept: FAMILY MEDICINE CLINIC | Age: 66
End: 2021-03-10

## 2021-03-10 DIAGNOSIS — N39.41 URGE INCONTINENCE: ICD-10-CM

## 2021-03-10 NOTE — TELEPHONE ENCOUNTER
Leonarda Aguero is calling to request a refill on the following medication(s):    Medication Request:  Requested Prescriptions     Pending Prescriptions Disp Refills    mirabegron (MYRBETRIQ) 25 MG TB24 28 tablet 0     Sig: Take 1 tablet by mouth daily       Last Visit Date (If Applicable):  0/50/4045    Next Visit Date:    8/27/2021

## 2021-03-12 ENCOUNTER — TELEPHONE (OUTPATIENT)
Dept: FAMILY MEDICINE CLINIC | Age: 66
End: 2021-03-12

## 2021-03-17 ENCOUNTER — OFFICE VISIT (OUTPATIENT)
Dept: GYNECOLOGIC ONCOLOGY | Age: 66
End: 2021-03-17
Payer: MEDICARE

## 2021-03-17 ENCOUNTER — HOSPITAL ENCOUNTER (OUTPATIENT)
Age: 66
Setting detail: SPECIMEN
Discharge: HOME | End: 2021-03-17
Payer: MEDICARE

## 2021-03-17 ENCOUNTER — TELEPHONE (OUTPATIENT)
Dept: FAMILY MEDICINE CLINIC | Age: 66
End: 2021-03-17

## 2021-03-17 VITALS
SYSTOLIC BLOOD PRESSURE: 161 MMHG | HEART RATE: 74 BPM | WEIGHT: 189.8 LBS | DIASTOLIC BLOOD PRESSURE: 101 MMHG | BODY MASS INDEX: 34.93 KG/M2 | OXYGEN SATURATION: 100 % | HEIGHT: 62 IN

## 2021-03-17 DIAGNOSIS — N89.8 VAGINAL ODOR: ICD-10-CM

## 2021-03-17 DIAGNOSIS — Z98.890 S/P LAPAROSCOPY: ICD-10-CM

## 2021-03-17 DIAGNOSIS — C54.1 ENDOMETRIAL CANCER DETERMINED BY UTERINE BIOPSY (HCC): Primary | ICD-10-CM

## 2021-03-17 DIAGNOSIS — R19.4 ENCOUNTER FOR DIAGNOSTIC COLONOSCOPY DUE TO CHANGE IN BOWEL HABITS: ICD-10-CM

## 2021-03-17 LAB
DIRECT EXAM: NORMAL
Lab: NORMAL
SPECIMEN DESCRIPTION: NORMAL

## 2021-03-17 PROCEDURE — 1090F PRES/ABSN URINE INCON ASSESS: CPT | Performed by: PHYSICIAN ASSISTANT

## 2021-03-17 PROCEDURE — 3017F COLORECTAL CA SCREEN DOC REV: CPT | Performed by: PHYSICIAN ASSISTANT

## 2021-03-17 PROCEDURE — G8400 PT W/DXA NO RESULTS DOC: HCPCS | Performed by: PHYSICIAN ASSISTANT

## 2021-03-17 PROCEDURE — 1036F TOBACCO NON-USER: CPT | Performed by: PHYSICIAN ASSISTANT

## 2021-03-17 PROCEDURE — 4040F PNEUMOC VAC/ADMIN/RCVD: CPT | Performed by: PHYSICIAN ASSISTANT

## 2021-03-17 PROCEDURE — G8484 FLU IMMUNIZE NO ADMIN: HCPCS | Performed by: PHYSICIAN ASSISTANT

## 2021-03-17 PROCEDURE — G8428 CUR MEDS NOT DOCUMENT: HCPCS | Performed by: PHYSICIAN ASSISTANT

## 2021-03-17 PROCEDURE — 99214 OFFICE O/P EST MOD 30 MIN: CPT | Performed by: PHYSICIAN ASSISTANT

## 2021-03-17 PROCEDURE — G8417 CALC BMI ABV UP PARAM F/U: HCPCS | Performed by: PHYSICIAN ASSISTANT

## 2021-03-17 PROCEDURE — 1123F ACP DISCUSS/DSCN MKR DOCD: CPT | Performed by: PHYSICIAN ASSISTANT

## 2021-03-17 ASSESSMENT — ENCOUNTER SYMPTOMS
CONSTIPATION: 1
BACK PAIN: 1

## 2021-03-17 NOTE — TELEPHONE ENCOUNTER
Patient had elevated BP readings at one of her specialist office. Please call and have her make appt to discuss BP.

## 2021-03-17 NOTE — PATIENT INSTRUCTIONS
Will notify of vaginitis results once available    Return to clinic in 6 months     Referral placed for colonoscopy    Follow up with PCP regarding elevated blood pressure    Call or return to clinic sooner with questions or concerns

## 2021-03-17 NOTE — Clinical Note
COMPA two elevated blood pressure readings in office today, pt stable and asymptomatic. Advised to follow up at your discretion. Appreciate your care of our patients.

## 2021-03-17 NOTE — PROGRESS NOTES
Review of Systems   Gastrointestinal: Positive for constipation (having a hard time passing stool). Genitourinary: Positive for hematuria (PCP sent to urology). Has vaginal odor . Musculoskeletal: Positive for back pain (not new). All other systems reviewed and are negative.

## 2021-03-17 NOTE — PROGRESS NOTES
[address] [facility], [facility], [address] [facility] M[place] [address] present for her endometrial cancer surveillance visit. 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 Patient's pre operative  antigen level was elevated on two accounts, 72 units on 9/30/16 and 75 units on day of surgery 10/19/16. Final pathology revealed \"high grade mixed cell carcinoma, (90% serous carcinoma and 10% clear cell carcinoma) with inner one-haly myometrial invasion (0.3 CM depth of invasion where myometrium measures 0.8 CM in total thickness). Bilateral fallopian tubes and ovaries negative for malignancy. Bilateral pelvic and para-aortic negative for malignancy. Omentum, appendix, and portion of small bowel negative for malignancy. Pelvic washings were negative for malignancy. FIGO Stage IA high grade mixed cell carcinoma. Per NCCN guidelines, she was recommended to received adjuvant chemotherapy and radiation therapy. She completed 6 cycles of carbo/taxol in April 2017 with Dr. Chio Hook at [facility]. She declined adjuvant radiation HDR therapy. Pt continues to follow with her medical oncologist Dr. Chio Hook. A CT chest abdomen pelvis obtained post chemotherapy revealed no evidence of metastatic disease, stable benign cyst on right lobe of liver, a 1.2 cm nodule on right thyroid, and tiny left lung nodule. Therefore she subsequently had FNA of thyroid in May 2017,and resulted as benign follicular nodule. Repeat CT chest abdomen pelvis in May 15 2018 was stable and unchanged. Most recent CT chest abdomen pelvis performed on July 7 2020 was overall unchanged from prior. She had a recent visit with her medical oncologist in January 2021, and her most  drawn on December 30 2020 was within normal limits < 5.5 units. No updates or changes to her surveillance plan per oncology note. She is now 4 1/2 years since her diagnosis and surgery for uterine cancer. Today,  She is doing well. She does have concerns of a vaginal odor she is unsure how long it has been present. She denies vaginal bleeding or discharge. She has no abdominal or pelvic pain. No new lumps or bumps. Denies dysuria or hematuria. States her bowel movements are chronic constipation and difficulty evacuating bowels. She is over due for colonoscopy secondary to narrowing of hepatic flexure noted on her prior CT scans by her medical oncologist. Last colonoscopy approximately 2011. She denies chest pain or shortness of breath. She is working with urology at this time for hematuria and she has a bladder scan and additional testing soon she states? Since her last visit, she has established care with PCP. She has a new diagnosis of degenerative changes in her hips. She was treated with steroids and states her pain has improved. She is up to date on mammogram screening in December 2020, BIRADS 1      ROS:  I have personally reviewed and agree with the review of systems done by my ancillary staff in the [facility] documentation. Physical Exam:    Vitals:    03/17/21 1104 03/17/21 1128   BP: (!) 169/92 (!) 161/101   Pulse: 74    SpO2: 100%    Weight: 189 lb 12.8 oz (86.1 kg)    Height: 5' 1.5\" (1.562 m)        General: anxious, yet well- appearing, no acute distress, alert and oriented x 3    HEENT: Thyroid normal size. No cervical or supraclavicular lymphadenopathy. Lungs: Clear to auscultate bilaterally to the bases    No CVA tenderness present bilaterally. Heart: Auscultation reveals regular rate and rhythm. No murmurs or gallops appreciated. Abdomen: Obese, soft.  Diffusely non tender to deep palpation. Midline vertical scar present, no herniations. No detectable organomegaly. No masses or ascites palpable. No inguinal lymphadenopathy bilaterally    Extremities: No edema, deformities, or calf tenderness bilaterally. Pelvic: The patient has normal female external genitalia including, vulva, urethra, vagina, perineum, Bartholin glands. Uterus, bilateral fallopian tubes and adnexae, and cervix are  surgically absent. Speculum examination reveals no blood in vaginal vault. There is scant discharge, vaginitis swab obtained. The vaginal cuff well-intact with no signs of erythema, induration, separation, or granulation tissue. Bimanual examination: Bladder is non-tender, without mass. There are no palpable vaginal nodules and no other pelvic masses, tenderness or pathology noted. 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 She is s/p DAWSON/BSO and staging biopsies, followed by completion of 6 cycles of carbo/taxol in April 2017. Today, she is now 4 1/2 year anniversary since surgery. Most recent  remains within normal < 5.5 units in December 2020. There is no clinical evidence of recurrence or metastasis, therefore she is to remain on surveillance guidelines at this time    Her Surveillance plan is as follows:   1. Will notify of vaginitis swab once results available     2. Return to clinic in 6 months for follow up surveillance, and at this time she will be 5 year anniversary and seen on yearly basis    3. Continue care with medical oncologist Dr. Verenice Arreaga, will follow  as ordered.      4. Referral placed for colonoscopy evaluation    5. Strongly encouraged to monitor blood pressure and follow up with PCP regarding elevation at today's visit. Will route note to her medical provider. Patient asymptomatic today. I spent 35 minutes providing care to the patient and >50% of the time was spent counseling and coordinating care, discussing the patient's current situation, reviewing her options, counseling and education her and answering her questions. The patient's pertinent images, labs, and previous records and procedures were reviewed.      Electronically signed by Tiffanie Gary PA-C on 3/17/2020 at 11:35 AM EDT

## 2021-03-19 ENCOUNTER — TELEPHONE (OUTPATIENT)
Dept: SURGERY | Age: 66
End: 2021-03-19

## 2021-03-19 NOTE — TELEPHONE ENCOUNTER
Please send script for SuTabs to patient pharmacy.   Colonoscopy scheduled for 4/9/21 @ 10:00 am.  Instructions and coupon sent to patient 3/19/21

## 2021-03-22 ENCOUNTER — OFFICE VISIT (OUTPATIENT)
Dept: FAMILY MEDICINE CLINIC | Age: 66
End: 2021-03-22
Payer: MEDICARE

## 2021-03-22 VITALS
OXYGEN SATURATION: 98 % | SYSTOLIC BLOOD PRESSURE: 136 MMHG | TEMPERATURE: 98.2 F | DIASTOLIC BLOOD PRESSURE: 82 MMHG | HEART RATE: 90 BPM | WEIGHT: 190.4 LBS | BODY MASS INDEX: 35.39 KG/M2

## 2021-03-22 DIAGNOSIS — I10 HTN, GOAL BELOW 130/80: Primary | ICD-10-CM

## 2021-03-22 PROCEDURE — 1090F PRES/ABSN URINE INCON ASSESS: CPT | Performed by: FAMILY MEDICINE

## 2021-03-22 PROCEDURE — 1036F TOBACCO NON-USER: CPT | Performed by: FAMILY MEDICINE

## 2021-03-22 PROCEDURE — G8417 CALC BMI ABV UP PARAM F/U: HCPCS | Performed by: FAMILY MEDICINE

## 2021-03-22 PROCEDURE — 4040F PNEUMOC VAC/ADMIN/RCVD: CPT | Performed by: FAMILY MEDICINE

## 2021-03-22 PROCEDURE — 3017F COLORECTAL CA SCREEN DOC REV: CPT | Performed by: FAMILY MEDICINE

## 2021-03-22 PROCEDURE — 99213 OFFICE O/P EST LOW 20 MIN: CPT | Performed by: FAMILY MEDICINE

## 2021-03-22 PROCEDURE — G8484 FLU IMMUNIZE NO ADMIN: HCPCS | Performed by: FAMILY MEDICINE

## 2021-03-22 PROCEDURE — G8400 PT W/DXA NO RESULTS DOC: HCPCS | Performed by: FAMILY MEDICINE

## 2021-03-22 PROCEDURE — G8427 DOCREV CUR MEDS BY ELIG CLIN: HCPCS | Performed by: FAMILY MEDICINE

## 2021-03-22 PROCEDURE — 1123F ACP DISCUSS/DSCN MKR DOCD: CPT | Performed by: FAMILY MEDICINE

## 2021-03-22 NOTE — PROGRESS NOTES
10/19/2016    COLONOSCOPY  [address]    ENDOMETRIAL BIOPSY      high grade carcinoma-ref to     HYSTERECTOMY      SMALL INTESTINE SURGERY      DAWSON AND BSO  10/19/2016    with pelvic and aorta lymph node dissection, omenectomy    TONSILLECTOMY      UMBILICAL HERNIA REPAIR  2010    UPPER GASTROINTESTINAL ENDOSCOPY         Family History   Problem Relation Age of Onset    Alzheimer's Disease Mother     Dementia Father     Alcohol Abuse Father     Alcohol Abuse Paternal Grandfather         Social History:     Social History     Socioeconomic History    Marital status:       Spouse name: Not on file    Number of children: Not on file    Years of education: Not on file    Highest education level: Not on file   Occupational History    Not on file   Social Needs    Financial resource strain: Not hard at all    Food insecurity     Worry: Never true     Inability: Never true   [employer] needs     Medical: No     Non-medical: No   Tobacco Use    Smoking status: Former Smoker     Packs/day: 1.00     Years: 10.00     Pack years: 10.00     Types: Cigarettes     Quit date: 1991     Years since quittin.2    Smokeless tobacco: Never Used   Substance and Sexual Activity    Alcohol use: Yes     Comment: rare    Drug use: No    Sexual activity: Yes   Lifestyle    Physical activity     Days per week: Not on file     Minutes per session: Not on file    Stress: Not on file   Relationships    Social connections     Talks on phone: Not on file     Gets together: Not on file     Attends Hinduism service: Not on file     Active member of club or organization: Not on file     Attends meetings of clubs or organizations: Not on file     Relationship status: Not on file    Intimate partner violence     Fear of current or ex partner: Not on file     Emotionally abused: Not on file     Physically abused: Not on file     Forced sexual activity: Not on file   Other Topics Concern    Not on file   Social History Narrative    Not on file        ROS:     Constitutional: No fevers, chills, fatigue. ENT: No nasal congestion or sore throat  Respiratory: No difficulty in breathing or cough. Cardiovascular: No chest pain, palpitations or shortness of breath  Gastrointestinal: No abdominal pain or change in bowel movements. Genitourinary: No change in urinary frequency or dysuria. Skin: No rashes or skin lesions. Neurological: No weakness. + headaches. Last Filed Vitals:  /82   Pulse 90   Temp 98.2 °F (36.8 °C)   Wt 190 lb 6.4 oz (86.4 kg)   SpO2 98%   BMI 35.39 kg/m²      Physical Examination:     GENERAL APPEARANCE: in no acute distress, well developed, well nourished. HEAD: normocephalic, atraumatic. EYES: extraocular movement intact (EOMI), pupils equal, round, reactive to light and accommodation. EARS: normal, tympanic membrane intact, clear, auditory canal clear. NOSE: nares patent, no erythema, sinuses nontender bilaterally, no rhinorrhea. ORAL CAVITY: mucosa moist, no lesions. THROAT: clear, no mass, no exudate. NECK/THYROID: neck supple, full range of motion, no thyromegaly. HEART: no murmurs, regular rate and rhythm, S1, S2 normal.   LUNGS: clear to auscultation bilaterally, no wheezes, rales, rhonchi. ABDOMEN: normal, bowel sounds present, soft, nontender, nondistended, no rebound guarding or rigidity    Recent Labs/ In Office Testing/ Radiograph review:     Hospital Outpatient Visit on 03/17/2021   Component Date Value Ref Range Status    Specimen Description 03/17/2021 . VAGINA   Final    Special Requests 03/17/2021 NOT REPORTED   Final    Direct Exam 03/17/2021 NEGATIVE for Candida sp.    Final    Direct Exam 03/17/2021 NEGATIVE for Trichomonas vaginalis   Final    Direct Exam 03/17/2021 NEGATIVE for Gardnerella vaginalis   Final    Direct Exam 03/17/2021 Method of testing is a DNA probe intended for detection and identification of Candida species, Gardnerella vaginalis, and Trichomonas vaginalis nucleic acid in vaginal fluid specimens from patients with symptoms of vaginitis/vaginosis. Final       No results found for this visit on 03/22/21. Assessment/Plan:     Keaton was seen today for blood pressure check. Diagnoses and all orders for this visit:    HTN, goal below 130/80    Will have patient log her BP at home and call with readings in one month. Marginally controlled in the office today. Asked patient to avoid OTC stimulants/sodium. All questions answered and addressed to patient satisfaction. Patient understands and agrees to the plan. The patient was evaluated and treated today based on the osteopathic principle that each person is a unit of body, mind, and spirit, the body is capable of self-regulation, self-healing, and health maintenance and that structure and function are reciprocally interrelated. Follow-up:   Return for keep scheduled appt.       Grisel Boggs D.O.

## 2021-04-02 ENCOUNTER — TELEPHONE (OUTPATIENT)
Dept: SURGERY | Age: 66
End: 2021-04-02

## 2021-04-02 RX ORDER — ATORVASTATIN CALCIUM 10 MG/1
10 TABLET, FILM COATED ORAL DAILY
Qty: 90 TABLET | Refills: 1 | Status: SHIPPED | OUTPATIENT
Start: 2021-04-02 | End: 2021-09-29

## 2021-04-02 NOTE — TELEPHONE ENCOUNTER
Patient called stating she is having a colonoscopy on 4/9/2021. She went to  her SuTabs from her pharmacy, but they do not have a prescription for her. Kamlesh Richards sent her instructions and a coupon on 3/19/2021. [facility] Could you send in a prescription for the SuTabs?

## 2021-04-02 NOTE — TELEPHONE ENCOUNTER
Chio Sharma is calling to request a refill on the following medication(s):    Medication Request:  Requested Prescriptions     Pending Prescriptions Disp Refills    atorvastatin (LIPITOR) 10 MG tablet 90 tablet 1     Sig: Take 1 tablet by mouth daily       Last Visit Date (If Applicable):  9/92/0233    Next Visit Date:    8/27/2021

## 2021-04-05 ENCOUNTER — HOSPITAL ENCOUNTER (OUTPATIENT)
Dept: LAB | Age: 66
Setting detail: SPECIMEN
Discharge: HOME | End: 2021-04-05
Payer: MEDICARE

## 2021-04-05 DIAGNOSIS — Z01.818 PREOP TESTING: Primary | ICD-10-CM

## 2021-04-05 PROCEDURE — U0003 INFECTIOUS AGENT DETECTION BY NUCLEIC ACID (DNA OR RNA); SEVERE ACUTE RESPIRATORY SYNDROME CORONAVIRUS 2 (SARS-COV-2) (CORONAVIRUS DISEASE [COVID-19]), AMPLIFIED PROBE TECHNIQUE, MAKING USE OF HIGH THROUGHPUT TECHNOLOGIES AS DESCRIBED BY CMS-2020-01-R: HCPCS

## 2021-04-05 PROCEDURE — U0005 INFEC AGEN DETEC AMPLI PROBE: HCPCS

## 2021-04-06 ENCOUNTER — ANESTHESIA EVENT (OUTPATIENT)
Dept: OPERATING ROOM | Age: 66
End: 2021-04-06
Payer: MEDICARE

## 2021-04-06 LAB
SARS-COV-2: NORMAL
SARS-COV-2: NOT DETECTED
SOURCE: NORMAL

## 2021-04-06 NOTE — PROGRESS NOTES
Preoperative Instructions:    Stop eating solid foods as per bowel prep instructions. Stop drinking clear liquids at midnight the night prior to your surgery. Arrive at the surgery center (3rd entrance) on ___4-0-57____________ by __[id number]_____________. Please stop any blood thinning medications as directed by your surgeon or prescribing physician. Failure to stop certain medications may interfere with your scheduled surgery. These may include: Aspirin, Coumadin, Plavix, NSAIDS (Motrin, Aleve, Advil, Mobic, Celebrex), Eliquis, Pradaxa, Xarelto, Fish oil, and herbal supplements. You may continue the rest of your medications through the night before surgery unless instructed otherwise. Day of surgery please take only the following medication(s) with a small sip of water:None      Please complete bowel prep as directed. Call Dr. Shahid Holguin office if no results or unable to compete prep. Reminders:  -If you are going home the day of your procedure, you will need a family member or friend to stay here or nearby available by phone during the procedure and drive you home after your procedure. Your  must be 25years of age or older and able to sign off on your discharge instructions.    -If you are going home the same day of your surgery, someone must remain with you for the first 24 hours after your surgery if you receive sedation or anesthesia.      -Please do not wear any jewelery or body piercing the day of surgery

## 2021-04-09 ENCOUNTER — HOSPITAL ENCOUNTER (OUTPATIENT)
Age: 66
Setting detail: OUTPATIENT SURGERY
Discharge: HOME | End: 2021-04-09
Attending: SURGERY | Admitting: SURGERY
Payer: MEDICARE

## 2021-04-09 ENCOUNTER — ANESTHESIA (OUTPATIENT)
Dept: OPERATING ROOM | Age: 66
End: 2021-04-09
Payer: MEDICARE

## 2021-04-09 VITALS
DIASTOLIC BLOOD PRESSURE: 68 MMHG | SYSTOLIC BLOOD PRESSURE: 126 MMHG | TEMPERATURE: 96 F | RESPIRATION RATE: 18 BRPM | HEIGHT: 62 IN | HEART RATE: 76 BPM | WEIGHT: 186 LBS | BODY MASS INDEX: 34.23 KG/M2 | OXYGEN SATURATION: 100 %

## 2021-04-09 VITALS
SYSTOLIC BLOOD PRESSURE: 115 MMHG | RESPIRATION RATE: 15 BRPM | OXYGEN SATURATION: 96 % | DIASTOLIC BLOOD PRESSURE: 55 MMHG

## 2021-04-09 PROCEDURE — 6360000002 HC RX W HCPCS: Performed by: SPECIALIST

## 2021-04-09 PROCEDURE — 7100000011 HC PHASE II RECOVERY - ADDTL 15 MIN: Performed by: SURGERY

## 2021-04-09 PROCEDURE — 2709999900 HC NON-CHARGEABLE SUPPLY: Performed by: SURGERY

## 2021-04-09 PROCEDURE — 2580000003 HC RX 258: Performed by: ANESTHESIOLOGY

## 2021-04-09 PROCEDURE — 3700000000 HC ANESTHESIA ATTENDED CARE: Performed by: SURGERY

## 2021-04-09 PROCEDURE — 45385 COLONOSCOPY W/LESION REMOVAL: CPT | Performed by: SURGERY

## 2021-04-09 PROCEDURE — 7100000010 HC PHASE II RECOVERY - FIRST 15 MIN: Performed by: SURGERY

## 2021-04-09 PROCEDURE — 3609010400 HC COLONOSCOPY POLYPECTOMY HOT BIOPSY: Performed by: SURGERY

## 2021-04-09 PROCEDURE — 88305 TISSUE EXAM BY PATHOLOGIST: CPT

## 2021-04-09 PROCEDURE — 2500000003 HC RX 250 WO HCPCS: Performed by: SPECIALIST

## 2021-04-09 PROCEDURE — 3700000001 HC ADD 15 MINUTES (ANESTHESIA): Performed by: SURGERY

## 2021-04-09 RX ORDER — ONDANSETRON 2 MG/ML
4 INJECTION INTRAMUSCULAR; INTRAVENOUS
Status: DISCONTINUED | OUTPATIENT
Start: 2021-04-09 | End: 2021-04-09 | Stop reason: HOSPADM

## 2021-04-09 RX ORDER — LIDOCAINE HYDROCHLORIDE 10 MG/ML
INJECTION, SOLUTION EPIDURAL; INFILTRATION; INTRACAUDAL; PERINEURAL PRN
Status: DISCONTINUED | OUTPATIENT
Start: 2021-04-09 | End: 2021-04-09 | Stop reason: SDUPTHER

## 2021-04-09 RX ORDER — SODIUM CHLORIDE 0.9 % (FLUSH) 0.9 %
10 SYRINGE (ML) INJECTION EVERY 12 HOURS SCHEDULED
Status: DISCONTINUED | OUTPATIENT
Start: 2021-04-09 | End: 2021-04-09 | Stop reason: HOSPADM

## 2021-04-09 RX ORDER — PROMETHAZINE HYDROCHLORIDE 25 MG/ML
6.25 INJECTION, SOLUTION INTRAMUSCULAR; INTRAVENOUS
Status: DISCONTINUED | OUTPATIENT
Start: 2021-04-09 | End: 2021-04-09 | Stop reason: HOSPADM

## 2021-04-09 RX ORDER — SODIUM CHLORIDE, SODIUM LACTATE, POTASSIUM CHLORIDE, CALCIUM CHLORIDE 600; 310; 30; 20 MG/100ML; MG/100ML; MG/100ML; MG/100ML
INJECTION, SOLUTION INTRAVENOUS CONTINUOUS
Status: DISCONTINUED | OUTPATIENT
Start: 2021-04-09 | End: 2021-04-09 | Stop reason: HOSPADM

## 2021-04-09 RX ORDER — HYDRALAZINE HYDROCHLORIDE 20 MG/ML
5 INJECTION INTRAMUSCULAR; INTRAVENOUS EVERY 10 MIN PRN
Status: DISCONTINUED | OUTPATIENT
Start: 2021-04-09 | End: 2021-04-09 | Stop reason: HOSPADM

## 2021-04-09 RX ORDER — DIPHENHYDRAMINE HYDROCHLORIDE 50 MG/ML
12.5 INJECTION INTRAMUSCULAR; INTRAVENOUS
Status: DISCONTINUED | OUTPATIENT
Start: 2021-04-09 | End: 2021-04-09 | Stop reason: HOSPADM

## 2021-04-09 RX ORDER — FENTANYL CITRATE 50 UG/ML
25 INJECTION, SOLUTION INTRAMUSCULAR; INTRAVENOUS EVERY 5 MIN PRN
Status: DISCONTINUED | OUTPATIENT
Start: 2021-04-09 | End: 2021-04-09 | Stop reason: HOSPADM

## 2021-04-09 RX ORDER — MORPHINE SULFATE 1 MG/ML
1 INJECTION, SOLUTION EPIDURAL; INTRATHECAL; INTRAVENOUS EVERY 5 MIN PRN
Status: DISCONTINUED | OUTPATIENT
Start: 2021-04-09 | End: 2021-04-09 | Stop reason: HOSPADM

## 2021-04-09 RX ORDER — SODIUM CHLORIDE 9 MG/ML
INJECTION, SOLUTION INTRAVENOUS CONTINUOUS
Status: DISCONTINUED | OUTPATIENT
Start: 2021-04-09 | End: 2021-04-09 | Stop reason: HOSPADM

## 2021-04-09 RX ORDER — MEPERIDINE HYDROCHLORIDE 50 MG/ML
12.5 INJECTION INTRAMUSCULAR; INTRAVENOUS; SUBCUTANEOUS EVERY 5 MIN PRN
Status: DISCONTINUED | OUTPATIENT
Start: 2021-04-09 | End: 2021-04-09 | Stop reason: HOSPADM

## 2021-04-09 RX ORDER — HYDROCODONE BITARTRATE AND ACETAMINOPHEN 5; 325 MG/1; MG/1
1 TABLET ORAL PRN
Status: DISCONTINUED | OUTPATIENT
Start: 2021-04-09 | End: 2021-04-09 | Stop reason: HOSPADM

## 2021-04-09 RX ORDER — PROPOFOL 10 MG/ML
INJECTION, EMULSION INTRAVENOUS PRN
Status: DISCONTINUED | OUTPATIENT
Start: 2021-04-09 | End: 2021-04-09 | Stop reason: SDUPTHER

## 2021-04-09 RX ORDER — HYDROCODONE BITARTRATE AND ACETAMINOPHEN 5; 325 MG/1; MG/1
2 TABLET ORAL PRN
Status: DISCONTINUED | OUTPATIENT
Start: 2021-04-09 | End: 2021-04-09 | Stop reason: HOSPADM

## 2021-04-09 RX ORDER — SODIUM CHLORIDE 0.9 % (FLUSH) 0.9 %
10 SYRINGE (ML) INJECTION PRN
Status: DISCONTINUED | OUTPATIENT
Start: 2021-04-09 | End: 2021-04-09 | Stop reason: HOSPADM

## 2021-04-09 RX ADMIN — LIDOCAINE HYDROCHLORIDE 50 MG: 10 INJECTION, SOLUTION EPIDURAL; INFILTRATION; INTRACAUDAL; PERINEURAL at 09:51

## 2021-04-09 RX ADMIN — SODIUM CHLORIDE, POTASSIUM CHLORIDE, SODIUM LACTATE AND CALCIUM CHLORIDE: 600; 310; 30; 20 INJECTION, SOLUTION INTRAVENOUS at 09:24

## 2021-04-09 RX ADMIN — PROPOFOL 100 MG: 10 INJECTION, EMULSION INTRAVENOUS at 09:57

## 2021-04-09 RX ADMIN — PROPOFOL 100 MG: 10 INJECTION, EMULSION INTRAVENOUS at 09:52

## 2021-04-09 RX ADMIN — PROPOFOL 100 MG: 10 INJECTION, EMULSION INTRAVENOUS at 10:03

## 2021-04-09 RX ADMIN — SODIUM CHLORIDE, POTASSIUM CHLORIDE, SODIUM LACTATE AND CALCIUM CHLORIDE: 600; 310; 30; 20 INJECTION, SOLUTION INTRAVENOUS at 09:26

## 2021-04-09 ASSESSMENT — PULMONARY FUNCTION TESTS
PIF_VALUE: 1

## 2021-04-09 ASSESSMENT — PAIN SCALES - GENERAL
PAINLEVEL_OUTOF10: 0
PAINLEVEL_OUTOF10: 0

## 2021-04-09 NOTE — ANESTHESIA POSTPROCEDURE EVALUATION
POST- ANESTHESIA EVALUATION       Pt Name: Jason Haskins  MRN: [id number]  YOB: 1955  Date of evaluation: 4/9/2021  Time:  11:55 AM      /68   Pulse 76   Temp 96 °F (35.6 °C) (Temporal)   Resp 18   Ht 5' 1.5\" (1.562 m)   Wt 186 lb (84.4 kg)   SpO2 100%   BMI 34.58 kg/m²      Consciousness Level  Awake  Cardiopulmonary Status  Stable  Pain Adequately Treated YES  Nausea / Vomiting  NO  Adequate Hydration  YES  Anesthesia Related Complications NONE      Electronically signed by Dorothy Palomino MD on 4/9/2021 at 11:55 AM       Department of Anesthesiology  Postprocedure Note    Patient: Jason Haskins  MRN: [id number]  YOB: 1955  Date of evaluation: 4/9/2021  Time:  11:55 AM     Procedure Summary     Date: 04/09/21 Room / Location: [address]    Anesthesia Start: 0930 Anesthesia Stop: 1955    Procedure: COLONOSCOPY POLYPECTOMY HOT BIOPSY (N/A ) Diagnosis: (SCREEN)    Surgeons: Zeina Greer DO Responsible Provider: Dorothy Palomino MD    Anesthesia Type: MAC ASA Status: 3          Anesthesia Type: MAC    Lokesh Phase I: Lokesh Score: 10    Lokesh Phase II: Lokesh Score: 8    Last vitals: Reviewed and per EMR flowsheets.        Anesthesia Post Evaluation

## 2021-04-09 NOTE — H&P
[facility]      Patient's Name/ Date of Birth/ Gender: Bryant Way / 1955 [de-identified]74 y.o.) / female     Attending physician: Jeanie Rodriguez DO    CC: colorectal cancer screening    History of present Illness: Bryant Way is a 74 y.o. female, presents today for colonoscopy for CRC screening. Last cscope was 10yrs ago, pt denies any significant findings. Last meal was ~12hrs ago    Past Medical History:  has a past medical history of Endometrial cancer ([address] Hyperlipidemia, Hypertension, Obese, and Umbilical hernia. Past Surgical History:   Past Surgical History:   Procedure Laterality Date    ABDOMINAL EXPLORATION SURGERY  10/19/2016    small bowel resection    APPENDECTOMY  10/19/2016    COLONOSCOPY  2011    [facility] ENDOMETRIAL BIOPSY  09/12/146    high grade carcinoma-ref to     HYSTERECTOMY      SMALL INTESTINE SURGERY      DAWSON AND BSO  10/19/2016    with pelvic and aorta lymph node dissection, omenectomy    TONSILLECTOMY      UMBILICAL HERNIA REPAIR  2010    UPPER GASTROINTESTINAL ENDOSCOPY  2011       Social History:  reports that she quit smoking about 30 years ago. Her smoking use included cigarettes. She has a 10.00 pack-year smoking history. She has never used smokeless tobacco. She reports current alcohol use. She reports that she does not use drugs. Family History: family history includes Alcohol Abuse in her father and paternal grandfather; Alzheimer's Disease in her mother; Dementia in her father. Review of Systems:   General: Denies fever, chills, night sweats, weight loss, malaise, fatigue  HEENT: Denies sore throat, sinus problems, allergic rhinosinusitis  Card: Denies chest pain, palpitations, orthopnea/PND. Denies h/o murmurs  Pulm: Denies cough, shortness of breath, LEE  GI:  Chronic constipation,     : Denies polyuria, dysuria, hematuria  Endo: Denies diabetes, thyroid problems.   Heme: Denies anemia, h/o Rad/Fem/DP/PT  CNS: Moves all extremities, no gross focal motor deficits  Skin: No erythema or ulcerations         Assessment:    Bo Bautista is a 74 y.o. female presents for colonoscopy for colorectal screening     Plan:    1. To OR for colonoscopy. The risks include bleeding, infection, scarring, perforation, damage to surrounding tissues, need for further surgery in the future. The benefits, alternatives, complications and procedure details were explained to the patient, all questions were answered. 2. We discussed the possibility of aborting the procedure if her colon demonstrates significant stool burden, pt would like to proceed.           Tiffani Jones  4/9/2021

## 2021-04-09 NOTE — OP NOTE
Operative Note      Patient: Erickson Jackson  YOB: 1955  MRN: [id number]    Date of Procedure: 4/9/2021    Pre-Op Diagnosis: SCREEN    Post-Op Diagnosis:   Rectal polyp  Mild sigmoid diverticulosis  Internal hemorrhoids       Procedure(s):  COLORECTAL CANCER SCREENING, NOT HIGH RISK    Surgeon(s):  Katerine Arechiga DO    Assistant:   * No surgical staff found *    Anesthesia: Monitor Anesthesia Care    Estimated Blood Loss (mL): none    Complications: None    Specimens:   ID Type Source Tests Collected by Time Destination   A : RECTAL POLYP Tissue Tissue SURGICAL PATHOLOGY Katerine Arechiga DO 4/9/2021 1010        Implants:  * No implants in log *      Drains: * No LDAs found *    Findings: as above    Detailed Description of Procedure:       INDICATIONS FOR PROCEDURE:   The patient is a 74y.o. year old female with history of above preop diagnosis. Colonoscopy with possible biopsy or polypectomy was recommend, the risk, benefits, expected outcome, and alternatives to the procedure were explained. Risks included but are not limited to bleeding, infection, respiratory distress, hypotension, and perforation of the colon. The patient understands and is in agreement. PROCEDURE DETAILS:  Patient was brought to the endoscopy suite and placed in the left lateral recumbent position. A time out was completed verifying correct patient, procedure and equipment. Anesthesia was induced using MAC guidelines. A digital rectal exam was performed. The colonoscope was inserted into the rectum without difficulty and the scope was advanced to the cecum which was identified by anatomy and by palpation. Bowel prep was adequate. The scope was slowly withdrawn visualizing the cecum, ascending colon, transverse colon, proximal descending or rectosigmoid colon. The scope was withdrawn to the rectal vault and then retroflexed. The scope was then straightened and removed.  The patient tolerated the procedure well and was transferred to the recovery unit in stable condition. Findings:  Cecum/Ascending colon: normal    Transverse colon: normal    Descending/Sigmoid colon: scattered small mouth diverticula of the sigmoid colon    Rectum/Anus: 6mm polyp removed by hot snare. Internal hemorrhoids    Greater than 6 minutes was spent withdrawing the colonoscope. IMPRESSION/PLAN:   1. Increase dietary fiber and water  2.  Patient is advised to have a repeat colonoscopy in 5 years unless otherwise dictated by pathology, sooner if blood in the stool, unexplained weight loss, or change in the bowels          Electronically signed by Louise Toledo DO on 4/9/2021 at 10:11 AM

## 2021-04-09 NOTE — ANESTHESIA PRE PROCEDURE
leg R20.0    Meralgia paresthetica of right side G57.11    Endometrial cancer determined by uterine biopsy ([facility] C54.1    10/19/16 TLH/BSO, pelvic and paraaortic lymphadenectomy, omental biopsy, appendectomy, small bowel resection Z98.890    Drug-induced polyneuropathy ([facility] G62.0       Past Medical History:        Diagnosis Date    Endometrial cancer ([facility]     Hyperlipidemia     Hypertension     Obese     Umbilical hernia        Past Surgical History:        Procedure Laterality Date    ABDOMINAL EXPLORATION SURGERY  10/19/2016    small bowel resection    APPENDECTOMY  10/19/2016    COLONOSCOPY      [facility] ENDOMETRIAL BIOPSY      high grade carcinoma-ref to     HYSTERECTOMY      SMALL INTESTINE SURGERY      DAWSON AND BSO  10/19/2016    with pelvic and aorta lymph node dissection, omenectomy    TONSILLECTOMY      UMBILICAL HERNIA REPAIR      UPPER GASTROINTESTINAL ENDOSCOPY         Social History:    Social History     Tobacco Use    Smoking status: Former Smoker     Packs/day: 1.00     Years: 10.00     Pack years: 10.00     Types: Cigarettes     Quit date: 1991     Years since quittin.2    Smokeless tobacco: Never Used   Substance Use Topics    Alcohol use: Yes     Comment: rare                                Counseling given: Not Answered      Vital Signs (Current):   Vitals:    21 0849   BP: (!) 157/88   Pulse: 71   Resp: 18   Temp: 96.6 °F (35.9 °C)   TempSrc: Infrared   SpO2: 99%   Weight: 186 lb (84.4 kg)   Height: 5' 1.5\" (1.562 m)                                              BP Readings from Last 3 Encounters:   21 (!) 157/88   21 136/82   21 (!) 161/101       NPO Status: Time of last liquid consumption: 07(drank last of 16 oz of water with prep)                        Time of last solid consumption: (bagel with chicken salad pt states miss read instructions Dr Harry Cart informed pt would like to try will proceed per exam  breath sounds clear to auscultation                             Cardiovascular:    (+) hypertension: no interval change, hyperlipidemia        Rhythm: regular  Rate: normal                    Neuro/Psych:   (+) neuromuscular disease:,              ROS comment: Numbness and tingling of right leg  Numbness in right leg    Meralgia paresthetica of right side  Drug-induced polyneuropathy GI/Hepatic/Renal:             Endo/Other:    (+) malignancy/cancer. ROS comment: Endometrial cancer Abdominal:       Abdomen: soft. Vascular: negative vascular ROS. Anesthesia Plan      MAC     ASA 3             Anesthetic plan and risks discussed with patient. Plan discussed with CRNA.                   Kelly Rivera MD   4/9/2021

## 2021-04-13 LAB — SURGICAL PATHOLOGY REPORT: NORMAL

## 2021-06-18 ENCOUNTER — ANESTHESIA EVENT (OUTPATIENT)
Dept: OPERATING ROOM | Age: 66
End: 2021-06-18
Payer: MEDICARE

## 2021-06-18 ENCOUNTER — HOSPITAL ENCOUNTER (OUTPATIENT)
Age: 66
Setting detail: OUTPATIENT SURGERY
Discharge: HOME | End: 2021-06-18
Attending: UROLOGY | Admitting: UROLOGY
Payer: MEDICARE

## 2021-06-18 ENCOUNTER — ANESTHESIA (OUTPATIENT)
Dept: OPERATING ROOM | Age: 66
End: 2021-06-18
Payer: MEDICARE

## 2021-06-18 VITALS
HEIGHT: 61 IN | OXYGEN SATURATION: 100 % | WEIGHT: 190 LBS | SYSTOLIC BLOOD PRESSURE: 127 MMHG | TEMPERATURE: 96.8 F | BODY MASS INDEX: 35.87 KG/M2 | HEART RATE: 52 BPM | DIASTOLIC BLOOD PRESSURE: 80 MMHG | RESPIRATION RATE: 20 BRPM

## 2021-06-18 VITALS — DIASTOLIC BLOOD PRESSURE: 62 MMHG | OXYGEN SATURATION: 100 % | TEMPERATURE: 95.6 F | SYSTOLIC BLOOD PRESSURE: 90 MMHG

## 2021-06-18 DIAGNOSIS — G89.18 POST-OPERATIVE PAIN: Primary | ICD-10-CM

## 2021-06-18 PROCEDURE — 7100000000 HC PACU RECOVERY - FIRST 15 MIN: Performed by: UROLOGY

## 2021-06-18 PROCEDURE — 2720000010 HC SURG SUPPLY STERILE: Performed by: UROLOGY

## 2021-06-18 PROCEDURE — 88307 TISSUE EXAM BY PATHOLOGIST: CPT

## 2021-06-18 PROCEDURE — 2580000003 HC RX 258: Performed by: ANESTHESIOLOGY

## 2021-06-18 PROCEDURE — 6360000002 HC RX W HCPCS: Performed by: PHYSICIAN ASSISTANT

## 2021-06-18 PROCEDURE — 6360000002 HC RX W HCPCS: Performed by: NURSE ANESTHETIST, CERTIFIED REGISTERED

## 2021-06-18 PROCEDURE — 7100000001 HC PACU RECOVERY - ADDTL 15 MIN: Performed by: UROLOGY

## 2021-06-18 PROCEDURE — 6360000002 HC RX W HCPCS: Performed by: ANESTHESIOLOGY

## 2021-06-18 PROCEDURE — 2500000003 HC RX 250 WO HCPCS: Performed by: NURSE ANESTHETIST, CERTIFIED REGISTERED

## 2021-06-18 PROCEDURE — 3600000014 HC SURGERY LEVEL 4 ADDTL 15MIN: Performed by: UROLOGY

## 2021-06-18 PROCEDURE — 7100000040 HC SPAR PHASE II RECOVERY - FIRST 15 MIN: Performed by: UROLOGY

## 2021-06-18 PROCEDURE — 2709999900 HC NON-CHARGEABLE SUPPLY: Performed by: UROLOGY

## 2021-06-18 PROCEDURE — 3700000000 HC ANESTHESIA ATTENDED CARE: Performed by: UROLOGY

## 2021-06-18 PROCEDURE — 2580000003 HC RX 258: Performed by: UROLOGY

## 2021-06-18 PROCEDURE — 3700000001 HC ADD 15 MINUTES (ANESTHESIA): Performed by: UROLOGY

## 2021-06-18 PROCEDURE — 3600000004 HC SURGERY LEVEL 4 BASE: Performed by: UROLOGY

## 2021-06-18 PROCEDURE — 93005 ELECTROCARDIOGRAM TRACING: CPT

## 2021-06-18 RX ORDER — DEXAMETHASONE SODIUM PHOSPHATE 10 MG/ML
INJECTION INTRAMUSCULAR; INTRAVENOUS PRN
Status: DISCONTINUED | OUTPATIENT
Start: 2021-06-18 | End: 2021-06-18 | Stop reason: SDUPTHER

## 2021-06-18 RX ORDER — FENTANYL CITRATE 50 UG/ML
INJECTION, SOLUTION INTRAMUSCULAR; INTRAVENOUS PRN
Status: DISCONTINUED | OUTPATIENT
Start: 2021-06-18 | End: 2021-06-18 | Stop reason: SDUPTHER

## 2021-06-18 RX ORDER — HYDROCODONE BITARTRATE AND ACETAMINOPHEN 5; 325 MG/1; MG/1
1 TABLET ORAL EVERY 6 HOURS PRN
Qty: 6 TABLET | Refills: 0 | Status: SHIPPED | OUTPATIENT
Start: 2021-06-18 | End: 2021-06-20

## 2021-06-18 RX ORDER — OXYCODONE HYDROCHLORIDE AND ACETAMINOPHEN 5; 325 MG/1; MG/1
1 TABLET ORAL PRN
Status: DISCONTINUED | OUTPATIENT
Start: 2021-06-18 | End: 2021-06-18 | Stop reason: HOSPADM

## 2021-06-18 RX ORDER — PROPOFOL 10 MG/ML
INJECTION, EMULSION INTRAVENOUS PRN
Status: DISCONTINUED | OUTPATIENT
Start: 2021-06-18 | End: 2021-06-18 | Stop reason: SDUPTHER

## 2021-06-18 RX ORDER — DIPHENHYDRAMINE HYDROCHLORIDE 50 MG/ML
12.5 INJECTION INTRAMUSCULAR; INTRAVENOUS
Status: DISCONTINUED | OUTPATIENT
Start: 2021-06-18 | End: 2021-06-18 | Stop reason: HOSPADM

## 2021-06-18 RX ORDER — FENTANYL CITRATE 50 UG/ML
25 INJECTION, SOLUTION INTRAMUSCULAR; INTRAVENOUS EVERY 5 MIN PRN
Status: DISCONTINUED | OUTPATIENT
Start: 2021-06-18 | End: 2021-06-18 | Stop reason: HOSPADM

## 2021-06-18 RX ORDER — SODIUM CHLORIDE, SODIUM LACTATE, POTASSIUM CHLORIDE, CALCIUM CHLORIDE 600; 310; 30; 20 MG/100ML; MG/100ML; MG/100ML; MG/100ML
INJECTION, SOLUTION INTRAVENOUS CONTINUOUS
Status: DISCONTINUED | OUTPATIENT
Start: 2021-06-18 | End: 2021-06-18 | Stop reason: HOSPADM

## 2021-06-18 RX ORDER — LIDOCAINE HYDROCHLORIDE 10 MG/ML
INJECTION, SOLUTION EPIDURAL; INFILTRATION; INTRACAUDAL; PERINEURAL PRN
Status: DISCONTINUED | OUTPATIENT
Start: 2021-06-18 | End: 2021-06-18 | Stop reason: SDUPTHER

## 2021-06-18 RX ORDER — CEPHALEXIN 500 MG/1
500 CAPSULE ORAL 3 TIMES DAILY
Qty: 9 CAPSULE | Refills: 0 | Status: SHIPPED | OUTPATIENT
Start: 2021-06-18 | End: 2021-06-21

## 2021-06-18 RX ORDER — OXYCODONE HYDROCHLORIDE AND ACETAMINOPHEN 5; 325 MG/1; MG/1
2 TABLET ORAL PRN
Status: DISCONTINUED | OUTPATIENT
Start: 2021-06-18 | End: 2021-06-18 | Stop reason: HOSPADM

## 2021-06-18 RX ORDER — ONDANSETRON 2 MG/ML
INJECTION INTRAMUSCULAR; INTRAVENOUS PRN
Status: DISCONTINUED | OUTPATIENT
Start: 2021-06-18 | End: 2021-06-18 | Stop reason: SDUPTHER

## 2021-06-18 RX ORDER — ONDANSETRON 2 MG/ML
4 INJECTION INTRAMUSCULAR; INTRAVENOUS
Status: DISCONTINUED | OUTPATIENT
Start: 2021-06-18 | End: 2021-06-18 | Stop reason: HOSPADM

## 2021-06-18 RX ORDER — LABETALOL HYDROCHLORIDE 5 MG/ML
5 INJECTION, SOLUTION INTRAVENOUS EVERY 10 MIN PRN
Status: DISCONTINUED | OUTPATIENT
Start: 2021-06-18 | End: 2021-06-18 | Stop reason: HOSPADM

## 2021-06-18 RX ORDER — MAGNESIUM HYDROXIDE 1200 MG/15ML
LIQUID ORAL CONTINUOUS PRN
Status: COMPLETED | OUTPATIENT
Start: 2021-06-18 | End: 2021-06-18

## 2021-06-18 RX ORDER — MORPHINE SULFATE 2 MG/ML
2 INJECTION, SOLUTION INTRAMUSCULAR; INTRAVENOUS EVERY 5 MIN PRN
Status: DISCONTINUED | OUTPATIENT
Start: 2021-06-18 | End: 2021-06-18 | Stop reason: HOSPADM

## 2021-06-18 RX ORDER — MIDAZOLAM HYDROCHLORIDE 2 MG/2ML
0.5 INJECTION, SOLUTION INTRAMUSCULAR; INTRAVENOUS 4 TIMES DAILY PRN
Status: COMPLETED | OUTPATIENT
Start: 2021-06-18 | End: 2021-06-18

## 2021-06-18 RX ADMIN — SODIUM CHLORIDE, POTASSIUM CHLORIDE, SODIUM LACTATE AND CALCIUM CHLORIDE: 600; 310; 30; 20 INJECTION, SOLUTION INTRAVENOUS at 11:37

## 2021-06-18 RX ADMIN — MIDAZOLAM HYDROCHLORIDE 0.5 MG: 1 INJECTION, SOLUTION INTRAMUSCULAR; INTRAVENOUS at 11:25

## 2021-06-18 RX ADMIN — FENTANYL CITRATE 50 MCG: 50 INJECTION, SOLUTION INTRAMUSCULAR; INTRAVENOUS at 12:13

## 2021-06-18 RX ADMIN — LIDOCAINE HYDROCHLORIDE 50 MG: 10 INJECTION, SOLUTION EPIDURAL; INFILTRATION; INTRACAUDAL; PERINEURAL at 11:43

## 2021-06-18 RX ADMIN — DEXAMETHASONE SODIUM PHOSPHATE 10 MG: 10 INJECTION INTRAMUSCULAR; INTRAVENOUS at 12:01

## 2021-06-18 RX ADMIN — ONDANSETRON 4 MG: 2 INJECTION, SOLUTION INTRAMUSCULAR; INTRAVENOUS at 11:59

## 2021-06-18 RX ADMIN — MIDAZOLAM HYDROCHLORIDE 0.5 MG: 1 INJECTION, SOLUTION INTRAMUSCULAR; INTRAVENOUS at 11:36

## 2021-06-18 RX ADMIN — PROPOFOL INJECTABLE EMULSION 200 MG: 10 INJECTION, EMULSION INTRAVENOUS at 11:43

## 2021-06-18 RX ADMIN — MIDAZOLAM HYDROCHLORIDE 0.5 MG: 1 INJECTION, SOLUTION INTRAMUSCULAR; INTRAVENOUS at 11:19

## 2021-06-18 RX ADMIN — FENTANYL CITRATE 50 MCG: 50 INJECTION, SOLUTION INTRAMUSCULAR; INTRAVENOUS at 11:59

## 2021-06-18 RX ADMIN — MIDAZOLAM HYDROCHLORIDE 0.5 MG: 1 INJECTION, SOLUTION INTRAMUSCULAR; INTRAVENOUS at 11:09

## 2021-06-18 RX ADMIN — CEFAZOLIN 2000 MG: 10 INJECTION, POWDER, FOR SOLUTION INTRAVENOUS at 11:50

## 2021-06-18 ASSESSMENT — PULMONARY FUNCTION TESTS
PIF_VALUE: 13
PIF_VALUE: 12
PIF_VALUE: 0
PIF_VALUE: 13
PIF_VALUE: 13
PIF_VALUE: 0
PIF_VALUE: 0
PIF_VALUE: 1
PIF_VALUE: 13
PIF_VALUE: 12
PIF_VALUE: 12
PIF_VALUE: 20
PIF_VALUE: 12
PIF_VALUE: 0
PIF_VALUE: 13
PIF_VALUE: 11
PIF_VALUE: 12
PIF_VALUE: 13
PIF_VALUE: 0
PIF_VALUE: 12
PIF_VALUE: 1
PIF_VALUE: 0
PIF_VALUE: 6
PIF_VALUE: 0
PIF_VALUE: 12
PIF_VALUE: 13
PIF_VALUE: 12
PIF_VALUE: 12
PIF_VALUE: 13
PIF_VALUE: 11
PIF_VALUE: 13
PIF_VALUE: 0
PIF_VALUE: 1
PIF_VALUE: 12
PIF_VALUE: 13
PIF_VALUE: 12

## 2021-06-18 ASSESSMENT — PAIN SCALES - GENERAL
PAINLEVEL_OUTOF10: 0

## 2021-06-18 ASSESSMENT — ENCOUNTER SYMPTOMS: DYSPNEA ACTIVITY LEVEL: AFTER AMBULATING 1 FLIGHT OF STAIRS

## 2021-06-18 ASSESSMENT — LIFESTYLE VARIABLES: SMOKING_STATUS: 0

## 2021-06-18 ASSESSMENT — PAIN - FUNCTIONAL ASSESSMENT: PAIN_FUNCTIONAL_ASSESSMENT: 0-10

## 2021-06-18 NOTE — H&P
Pre-op History and Physical  Héctor Emery PA-C    Patient:  Teri Collins  MRN: [id number]  YOB: 1955    HISTORY OF PRESENT ILLNESS:     The patient is a 74 y.o. female who presents with history of hematuria and bladder tumor. Here for Cystoscopy, TURBT. Patient's old records, notes and chart reviewed and summarized above. Héctor Emery PA-C independently reviewed the images and verified the radiology reports from:    No results found. Past Medical History:    Past Medical History:   Diagnosis Date    Endometrial cancer ([address]     Hyperlipidemia     Hypertension     Obese     Umbilical hernia        Past Surgical History:    Past Surgical History:   Procedure Laterality Date    ABDOMINAL EXPLORATION SURGERY  10/19/2016    small bowel resection    APPENDECTOMY  10/19/2016    [facility]    COLONOSCOPY N/A 04/09/2021    : COLORECTAL CANCER SCREENING, NOT HIGH RISK (N/A )    COLONOSCOPY N/A 4/9/2021    COLONOSCOPY POLYPECTOMY HOT BIOPSY performed by Cedric Lackey DO at [facility]  09/12/146    high grade carcinoma-ref to     HYSTERECTOMY      SMALL INTESTINE SURGERY      DAWSON AND BSO  10/19/2016    with pelvic and aorta lymph node dissection, omenectomy    TONSILLECTOMY      UMBILICAL HERNIA REPAIR  2010    UPPER GASTROINTESTINAL ENDOSCOPY  2011       Medications Prior to Admission:    Prior to Admission medications    Medication Sig Start Date End Date Taking? Authorizing Provider   atorvastatin (LIPITOR) 10 MG tablet Take 1 tablet by mouth daily 4/2/21   Yovani Hdez DO   Sodium Sulfate-Mag Sulfate-KCl 8428-390-320 MG TABS Day before scope take 12 tabs with water over 20min then drink 32oz water over the next 2hrs. 8hrs before scope: repeat same process.  4/2/21   Cedric Lackey DO   ibuprofen (ADVIL;MOTRIN) 800 MG tablet TAKE 1 TABLET EVERY 8 HOURS AS NEEDED FOR PAIN 5/21/20   Kip Eduardo MD ranitidine (ZANTAC) 150 MG tablet TAKE 1 TABLET TWICE A DAY  Patient taking differently: TAKES 1 TABLET  DAILY 19   Amy Pagan MD   Multiple Vitamins-Minerals (THERAPEUTIC MULTIVITAMIN-MINERALS) tablet Take 1 tablet by mouth daily    Historical Provider, MD   vitamin B-12 (CYANOCOBALAMIN) 1000 MCG tablet Take 1,000 mcg by mouth daily    Historical Provider, MD       Allergies:  Patient has no known allergies. Social History:    Social History     Socioeconomic History    Marital status:      Spouse name: Not on file    Number of children: Not on file    Years of education: Not on file    Highest education level: Not on file   Occupational History    Not on file   Tobacco Use    Smoking status: Former Smoker     Packs/day: 1.00     Years: 10.00     Pack years: 10.00     Types: Cigarettes     Quit date: 1991     Years since quittin.4    Smokeless tobacco: Never Used   Substance and Sexual Activity    Alcohol use: Yes     Comment: rare    Drug use: No    Sexual activity: Yes   Other Topics Concern    Not on file   Social History Narrative    Not on file     Social Determinants of Health     Financial Resource Strain: Low Risk     Difficulty of Paying Living Expenses: Not hard at all   Food Insecurity: No Food Insecurity    Worried About [address] in the Last Year: Never true    [address] in the Last Year: Never true   Transportation Needs: No Transportation Needs    Lack of Transportation (Medical): No    Lack of Transportation (Non-Medical):  No   Physical Activity:     Days of Exercise per Week:     Minutes of Exercise per Session:    Stress:     Feeling of Stress :    Social Connections:     Frequency of Communication with Friends and Family:     Frequency of Social Gatherings with Friends and Family:     Attends Buddhist Services:     Active Member of Clubs or Organizations:     Attends Club or Organization Meetings:     Marital Status: biopsy ([facility]    10/19/16 TLH/BSO, pelvic and paraaortic lymphadenectomy, omental biopsy, appendectomy, small bowel resection    [facility] ([facility]       Plan: Cystoscopy, transurethral resection of bladder tumor in OR today. Consent obtained    The patient was counseled at length about the risks of jelly Covid-19 during their perioperative period and any recovery window from their procedure. The patient was made aware that jelly Covid-19  may worsen their prognosis for recovering from their procedure  and lend to a higher morbidity and/or mortality risk. All material risks, benefits, and reasonable alternatives including postponing the procedure were discussed. The patient does wish to proceed with the procedure at this time.         Justina Morgan PA-C  9:34 AM 6/18/2021

## 2021-06-18 NOTE — ANESTHESIA POSTPROCEDURE EVALUATION
Department of Anesthesiology  Postprocedure Note    Patient: Valerie Bowie  MRN: [id number]  YOB: 1955  Date of evaluation: 6/18/2021  Time:  12:54 PM     Procedure Summary     Date: 06/18/21 Room / Location: [address]    Anesthesia Start: 1137 Anesthesia Stop: 1217    Procedure: CYSTOSCOPY TRANSURETHRAL RESECTION BLADDER TUMOR WITH GYRUS (N/A ) Diagnosis: (BLADDER TUMOR)    Surgeons: Dejan Toscano MD Responsible Provider: Sammi Li MD    Anesthesia Type: general ASA Status: 2          Anesthesia Type: general    Lokesh Phase I: Lokesh Score: 10    Lokesh Phase II:      Last vitals: Reviewed and per EMR flowsheets.        Anesthesia Post Evaluation    Patient location during evaluation: PACU  Patient participation: complete - patient participated  Level of consciousness: awake and alert  Pain score: 0  Nausea & Vomiting: no nausea  Cardiovascular status: hemodynamically stable  Respiratory status: room air
18

## 2021-06-18 NOTE — PROGRESS NOTES
Keflex script called to [facility] pharmacy on [facility]

## 2021-06-18 NOTE — ANESTHESIA PRE PROCEDURE
Department of Anesthesiology  Preprocedure Note       Name:  James Laguna   Age:  74 y.o.  :  1955                                          MRN:  [id number]         Date:  2021      Surgeon: Colleen Hillman):  Talia Key MD    Procedure: Procedure(s):  CYSTOSCOPY TRANSURETHRAL RESECTION BLADDER TUMOR WITH GYRUS    Medications prior to admission:   Prior to Admission medications    Medication Sig Start Date End Date Taking? Authorizing Provider   cephALEXin (KEFLEX) 500 MG capsule Take 1 capsule by mouth 3 times daily for 3 days 21 Yes Ling Duron PA-C   HYDROcodone-acetaminophen (NORCO) 5-325 MG per tablet Take 1 tablet by mouth every 6 hours as needed for Pain for up to 2 days.  May take 2 tablets if needed 21 Yes Nova Gentile PA-C   NONFORMULARY Take 1 tablet by mouth every other day Fiber tabs   Yes Historical Provider, MD   atorvastatin (LIPITOR) 10 MG tablet Take 1 tablet by mouth daily 21  Yes Marleny Brown DO   ranitidine (ZANTAC) 150 MG tablet TAKE 1 TABLET TWICE A DAY  Patient taking differently: TAKES 1 TABLET  DAILY 19  Yes Dc Wellington MD   vitamin B-12 (CYANOCOBALAMIN) 1000 MCG tablet Take 1,000 mcg by mouth daily   Yes Historical Provider, MD   ibuprofen (ADVIL;MOTRIN) 800 MG tablet TAKE 1 TABLET EVERY 8 HOURS AS NEEDED FOR PAIN 20   Janine Mcdonnell MD   Multiple Vitamins-Minerals (THERAPEUTIC MULTIVITAMIN-MINERALS) tablet Take 1 tablet by mouth daily    Historical Provider, MD       Current medications:    Current Facility-Administered Medications   Medication Dose Route Frequency Provider Last Rate Last Admin    ceFAZolin (ANCEF) 2000 mg in dextrose 5 % 50 mL IVPB  2,000 mg Intravenous On Call to [address] [place], [place] [address]Jessie PA-C           Allergies:  No Known Allergies    Problem List:    Patient Active Problem List   Diagnosis Code    Hypertension I10    Hyperlipidemia E78.5    Numbness and tingling of right leg R20.0, R20.2    Numbness in right leg R20.0    Meralgia paresthetica of right side G57.11    Endometrial cancer determined by uterine biopsy (HCC) C54.1    10/19/16 TLH/BSO, pelvic and paraaortic lymphadenectomy, omental biopsy, appendectomy, small bowel resection Z98.890    Drug-induced polyneuropathy ([facility] G62.0       Past Medical History:        Diagnosis Date    [facility] ([facility]     Hyperlipidemia     Hypertension     Obese     Umbilical hernia        Past Surgical History:        Procedure Laterality Date    ABDOMINAL EXPLORATION SURGERY  10/19/2016    small bowel resection    APPENDECTOMY  10/19/2016    [facility]      [facility]    COLONOSCOPY N/A 2021    : COLORECTAL CANCER SCREENING, NOT HIGH RISK (N/A )    COLONOSCOPY N/A 2021    COLONOSCOPY POLYPECTOMY HOT BIOPSY performed [facility] Tiff Craig DO [facility]      high grade carcinoma-ref to     HYSTERECTOMY      SMALL INTESTINE SURGERY      DAWSON AND BSO  10/19/2016    with pelvic and aorta lymph node dissection, omenectomy    TONSILLECTOMY      UMBILICAL HERNIA REPAIR      UPPER GASTROINTESTINAL ENDOSCOPY         Social History:    Social History     Tobacco Use    Smoking status: Former Smoker     Packs/day: 1.00     Years: 10.00     Pack years: 10.00     Types: Cigarettes     Quit date: 1991     Years since quittin.4    Smokeless tobacco: Never Used   Substance Use Topics    Alcohol use: Yes     Comment: rare                                Counseling given: Not Answered      Vital Signs (Current): There were no vitals filed for this visit.                                            BP Readings from Last 3 Encounters:   21 (!) 115/55   21 126/68   21 136/82       NPO Status: Time of last liquid consumption: 2200                        Time of last solid consumption: 1800                        Date of last liquid consumption: 21 Date of last solid food consumption: 06/17/21    BMI:   Wt Readings from Last 3 Encounters:   04/09/21 186 lb (84.4 kg)   03/22/21 190 lb 6.4 oz (86.4 kg)   03/17/21 189 lb 12.8 oz (86.1 kg)     There is no height or weight on file to calculate BMI.    CBC:   Lab Results   Component Value Date    WBC 8.2 02/15/2021    RBC 4.40 02/15/2021    HGB 13.1 02/15/2021    HCT 43.0 02/15/2021    MCV 97.7 02/15/2021    RDW 14.0 02/15/2021     02/15/2021       CMP:   Lab Results   Component Value Date     02/15/2021    K 4.1 02/15/2021     02/15/2021    CO2 23 02/15/2021    BUN 28 02/15/2021    CREATININE 0.63 02/15/2021    GFRAA >60 02/15/2021    LABGLOM >60 02/15/2021    GLUCOSE 75 02/15/2021    PROT 7.0 02/13/2019    CALCIUM 9.4 02/15/2021    BILITOT 0.55 02/13/2019    ALKPHOS 26 02/13/2019    AST 18 02/15/2021    ALT 12 02/15/2021       POC Tests: No results for input(s): POCGLU, POCNA, POCK, POCCL, POCBUN, POCHEMO, POCHCT in the last 72 hours.     Coags:   Lab Results   Component Value Date    PROTIME 10.3 10/19/2016    INR 1.0 10/19/2016    APTT 24.8 10/19/2016       HCG (If Applicable): No results found for: PREGTESTUR, PREGSERUM, HCG, HCGQUANT     ABGs: No results found for: PHART, PO2ART, VBI0JDZ, RXE4SRP, BEART, C4PYBVEM     Type & Screen (If Applicable):  No results found for: LABABO, LABRH    Drug/Infectious Status (If Applicable):  Lab Results   Component Value Date    HEPCAB NONREACTIVE 02/15/2021       COVID-19 Screening (If Applicable):   Lab Results   Component Value Date    COVID19 Not Detected 04/05/2021           Anesthesia Evaluation   no history of anesthetic complications:   Airway: Mallampati: II     Neck ROM: full   Dental:    (+) upper dentures and partials      Pulmonary:       (-) sleep apnea and not a current smoker                          ROS comment: 10 pk yrs   Cardiovascular:    (+) hypertension:, LEE: after ambulating 1 flight of stairs,     (-) CAD Neuro/Psych:   (+) neuromuscular disease:,    (-) seizures and CVA            ROS comment: neuropathy GI/Hepatic/Renal:   (+) GERD: well controlled,          ROS comment: Bladder tumor. Endo/Other:        (-) diabetes mellitus               Abdominal:           Vascular:                                      Anesthesia Plan      general     ASA 2       Induction: intravenous.                           Cameron Gonzales MD   6/18/2021

## 2021-06-18 NOTE — OP NOTE
Patient:  Ale Saldana  MRN: [id number]  YOB: 1955    FACILITY: [place], [place]    DATE: 6/18/2021    SURGEON: Johann Fisher MD     ASSISTANT: none    PREOPERATIVE DIAGNOSIS: BLADDER TUMOR    POSTOPERATIVE DIAGNOSIS: bladder    PROCEDURE PERFORMED:     1. Cystoscopy  2. Transurethral resection of bladder tumor small    ANESTHESIA: General    ESTIMATED BLOOD LOSS: 0 ml    COMPLICATIONS: None immediate    DRAINS: no catheter    SPECIMENS:   ID Type Source Tests Collected by Time Destination   A : BLADDER TUMOR Tissue Bladder SURGICAL PATHOLOGY Richi Chow MD 6/18/2021 1205        INDICATIONS FOR PROCEDURE:  The patient is a 74 y.o. female who presents today with BLADDER TUMOR here for CYSTOSCOPY TRANSURETHRAL RESECTION BLADDER TUMOR WITH GYRUS. After risks, benefits and alternatives of the procedure were discussed with the patient, the patient elected to proceed. DETAILS OF THE PROCEDURE:  The patient was correctly identified in the preoperative holding area. she was brought back to the operating room and placed in the dorsal lithotomy position. EPC cuffs were on, in place, and fully functional. General endotracheal anesthesia was administered. she was given antibiotic prophylaxis. The patient was then prepped and draped in the usual sterile fashion. After appropriate time-out was performed with all parties agreeing, the visual obturator was inserted into the bladder. A thorough and complete cystoscopy was then performed which showed tumor just lateral to right ureteral orifice. The largest tumor was 1cm in size. The ureteral orifices were patent in the orthotopic location. The resectoscope was then inserted and used to resect the tumors. The resection did appear to obtain muscle. There did not appear to be residual tumor. All bleeding areas were fulgurated and hemostasis was visualized. The bladder was then drained and the cystoscope was removed.  We did not insert a catheter. The patient tolerated the procedure well and was sent to PACU for postoperative monitoring. DISPOSITION:  The patient was discharged home in stable condition      Follow up: Will arrange appropriate follow up with [facility] for pathology results .

## 2021-06-19 LAB
EKG ATRIAL RATE: 64 BPM
EKG P AXIS: 29 DEGREES
EKG P-R INTERVAL: 162 MS
EKG Q-T INTERVAL: 456 MS
EKG QRS DURATION: 98 MS
EKG QTC CALCULATION (BAZETT): 470 MS
EKG R AXIS: 9 DEGREES
EKG T AXIS: -4 DEGREES
EKG VENTRICULAR RATE: 64 BPM

## 2021-06-19 PROCEDURE — 93010 ELECTROCARDIOGRAM REPORT: CPT | Performed by: INTERNAL MEDICINE

## 2021-06-21 LAB — SURGICAL PATHOLOGY REPORT: NORMAL

## 2021-07-08 RX ORDER — IBUPROFEN 800 MG/1
TABLET ORAL
Qty: 270 TABLET | Refills: 3 | Status: SHIPPED | OUTPATIENT
Start: 2021-07-08 | End: 2021-10-21 | Stop reason: SDUPTHER

## 2021-07-08 NOTE — TELEPHONE ENCOUNTER
Anum Rosa is calling to request a refill on the following medication(s):    Medication Request:  Requested Prescriptions     Pending Prescriptions Disp Refills    ibuprofen (ADVIL;MOTRIN) 800 MG tablet 270 tablet 3     Sig: TAKE 1 TABLET EVERY 8 HOURS AS NEEDED FOR PAIN       Last Visit Date (If Applicable):  5/54/0513 In office    Next Visit Date:    8/27/2021

## 2021-09-08 ENCOUNTER — TELEPHONE (OUTPATIENT)
Dept: FAMILY MEDICINE CLINIC | Age: 66
End: 2021-09-08

## 2021-09-08 NOTE — TELEPHONE ENCOUNTER
----- Message from [place], [place] sent at 9/8/2021  2:36 PM EDT -----  Subject: Appointment Request    Reason for Call: Routine (Patient Request) No Script    QUESTIONS  Type of Appointment? Established Patient  Reason for appointment request? Available appointments did not meet   patient need  Additional Information for Provider? Pt is needing to make an appt to   discuss her left knee, states that she has had some on going issues and   the only time she is able to come in is this week Friday 9/10 or she is   off 9/16 and 9/17. Pt can be reached at the number below to schedule   accordingly. Thank you so much!  ---------------------------------------------------------------------------  --------------  CALL BACK INFO  What is the best way for the office to contact you? OK to leave message on   voicemail  Preferred Call Back Phone Number? [phone]  ---------------------------------------------------------------------------  --------------  SCRIPT ANSWERS  Relationship to Patient? Self  (Is the patient requesting to see the provider for a procedure?)? No  (Is the patient requesting to see the provider urgently  today or   tomorrow. )? No  Have you been diagnosed with, awaiting test results for, or told that you   are suspected of having COVID-19 (Coronavirus)? (If patient has tested   negative or was tested as a requirement for work, school, or travel and   not based on symptoms, answer no)? No  Do you currently have flu-like symptoms including fever or chills, cough,   shortness of breath, difficulty breathing, or new loss of taste or smell? No  Have you had close contact with someone with COVID-19 in the last 14 days? No  (Service Expert  click yes below to proceed with [employer] As Usual   Scheduling)?  Yes

## 2021-09-17 ENCOUNTER — OFFICE VISIT (OUTPATIENT)
Dept: FAMILY MEDICINE CLINIC | Age: 66
End: 2021-09-17
Payer: MEDICARE

## 2021-09-17 ENCOUNTER — TELEPHONE (OUTPATIENT)
Dept: SURGERY | Age: 66
End: 2021-09-17

## 2021-09-17 ENCOUNTER — OFFICE VISIT (OUTPATIENT)
Dept: GYNECOLOGIC ONCOLOGY | Age: 66
End: 2021-09-17
Payer: MEDICARE

## 2021-09-17 VITALS
TEMPERATURE: 97.2 F | DIASTOLIC BLOOD PRESSURE: 70 MMHG | BODY MASS INDEX: 37.79 KG/M2 | SYSTOLIC BLOOD PRESSURE: 110 MMHG | OXYGEN SATURATION: 97 % | HEART RATE: 88 BPM | WEIGHT: 200 LBS

## 2021-09-17 VITALS
SYSTOLIC BLOOD PRESSURE: 177 MMHG | OXYGEN SATURATION: 97 % | BODY MASS INDEX: 37.87 KG/M2 | DIASTOLIC BLOOD PRESSURE: 94 MMHG | WEIGHT: 200.6 LBS | TEMPERATURE: 98.1 F | HEART RATE: 74 BPM | HEIGHT: 61 IN

## 2021-09-17 DIAGNOSIS — E78.5 DYSLIPIDEMIA: Primary | ICD-10-CM

## 2021-09-17 DIAGNOSIS — Z12.31 ENCOUNTER FOR SCREENING MAMMOGRAM FOR MALIGNANT NEOPLASM OF BREAST: ICD-10-CM

## 2021-09-17 DIAGNOSIS — C54.1 ENDOMETRIAL CANCER DETERMINED BY UTERINE BIOPSY (HCC): Primary | ICD-10-CM

## 2021-09-17 DIAGNOSIS — L98.9 SKIN LESION OF NECK: ICD-10-CM

## 2021-09-17 DIAGNOSIS — M17.12 PRIMARY OSTEOARTHRITIS OF LEFT KNEE: ICD-10-CM

## 2021-09-17 DIAGNOSIS — R73.9 HYPERGLYCEMIA: ICD-10-CM

## 2021-09-17 DIAGNOSIS — G89.29 CHRONIC PAIN OF LEFT KNEE: ICD-10-CM

## 2021-09-17 DIAGNOSIS — Z98.890 S/P LAPAROSCOPY: ICD-10-CM

## 2021-09-17 DIAGNOSIS — M25.562 CHRONIC PAIN OF LEFT KNEE: ICD-10-CM

## 2021-09-17 PROCEDURE — 1090F PRES/ABSN URINE INCON ASSESS: CPT | Performed by: PHYSICIAN ASSISTANT

## 2021-09-17 PROCEDURE — 1090F PRES/ABSN URINE INCON ASSESS: CPT | Performed by: FAMILY MEDICINE

## 2021-09-17 PROCEDURE — 99214 OFFICE O/P EST MOD 30 MIN: CPT | Performed by: PHYSICIAN ASSISTANT

## 2021-09-17 PROCEDURE — 1123F ACP DISCUSS/DSCN MKR DOCD: CPT | Performed by: FAMILY MEDICINE

## 2021-09-17 PROCEDURE — 3017F COLORECTAL CA SCREEN DOC REV: CPT | Performed by: FAMILY MEDICINE

## 2021-09-17 PROCEDURE — 4040F PNEUMOC VAC/ADMIN/RCVD: CPT | Performed by: PHYSICIAN ASSISTANT

## 2021-09-17 PROCEDURE — G8400 PT W/DXA NO RESULTS DOC: HCPCS | Performed by: PHYSICIAN ASSISTANT

## 2021-09-17 PROCEDURE — 20610 DRAIN/INJ JOINT/BURSA W/O US: CPT | Performed by: FAMILY MEDICINE

## 2021-09-17 PROCEDURE — G8400 PT W/DXA NO RESULTS DOC: HCPCS | Performed by: FAMILY MEDICINE

## 2021-09-17 PROCEDURE — G8427 DOCREV CUR MEDS BY ELIG CLIN: HCPCS | Performed by: FAMILY MEDICINE

## 2021-09-17 PROCEDURE — G8417 CALC BMI ABV UP PARAM F/U: HCPCS | Performed by: FAMILY MEDICINE

## 2021-09-17 PROCEDURE — 99214 OFFICE O/P EST MOD 30 MIN: CPT | Performed by: FAMILY MEDICINE

## 2021-09-17 PROCEDURE — 1123F ACP DISCUSS/DSCN MKR DOCD: CPT | Performed by: PHYSICIAN ASSISTANT

## 2021-09-17 PROCEDURE — 1036F TOBACCO NON-USER: CPT | Performed by: PHYSICIAN ASSISTANT

## 2021-09-17 PROCEDURE — 1036F TOBACCO NON-USER: CPT | Performed by: FAMILY MEDICINE

## 2021-09-17 PROCEDURE — G8427 DOCREV CUR MEDS BY ELIG CLIN: HCPCS | Performed by: PHYSICIAN ASSISTANT

## 2021-09-17 PROCEDURE — G8417 CALC BMI ABV UP PARAM F/U: HCPCS | Performed by: PHYSICIAN ASSISTANT

## 2021-09-17 PROCEDURE — 3017F COLORECTAL CA SCREEN DOC REV: CPT | Performed by: PHYSICIAN ASSISTANT

## 2021-09-17 PROCEDURE — 4040F PNEUMOC VAC/ADMIN/RCVD: CPT | Performed by: FAMILY MEDICINE

## 2021-09-17 RX ORDER — TRIAMCINOLONE ACETONIDE 40 MG/ML
40 INJECTION, SUSPENSION INTRA-ARTICULAR; INTRAMUSCULAR ONCE
Status: COMPLETED | OUTPATIENT
Start: 2021-09-17 | End: 2021-09-17

## 2021-09-17 RX ADMIN — TRIAMCINOLONE ACETONIDE 40 MG: 40 INJECTION, SUSPENSION INTRA-ARTICULAR; INTRAMUSCULAR at 15:46

## 2021-09-17 ASSESSMENT — ENCOUNTER SYMPTOMS
EYES NEGATIVE: 1
RESPIRATORY NEGATIVE: 1
CONSTIPATION: 1

## 2021-09-17 ASSESSMENT — PATIENT HEALTH QUESTIONNAIRE - PHQ9
2. FEELING DOWN, DEPRESSED OR HOPELESS: 0
SUM OF ALL RESPONSES TO PHQ QUESTIONS 1-9: 0
1. LITTLE INTEREST OR PLEASURE IN DOING THINGS: 0
SUM OF ALL RESPONSES TO PHQ QUESTIONS 1-9: 0
SUM OF ALL RESPONSES TO PHQ QUESTIONS 1-9: 0
SUM OF ALL RESPONSES TO PHQ9 QUESTIONS 1 & 2: 0

## 2021-09-17 NOTE — TELEPHONE ENCOUNTER
Patient has a referral in the chart for a colon screening. Please contact the patient at [phone]. Thank you.

## 2021-09-17 NOTE — PROGRESS NOTES
Progress Note    Anum Rosa is a 74 y.o.  female who presents today alone for evaluation of   Chief Complaint   Patient presents with    6 Month Follow-Up    Hypertension    Knee Pain     left , 1 month            HPI:   Patient is here for follow up on dyslipidemia, htn, and hyperglycemia. Patient states her home readings are between 130-140/70-80. Patient denies cp/sob/le edema/dizziness/lightheadedness/blurry va/ha. Patient denies PND/orthopnea. Patient reports left knee pain. Patient denies trauma or injury. Patient denies redness or swelling. Patient denies relief with OTC products. Patient states the pain has been present for at least 6 weeks. Patient states the pain is intermittent. Patient denies popping or clicking. Patient would like referral to dermatology for mole removal off of neck.     Health Maintenance Due   Topic Date Due    Shingles Vaccine (1 of 2) Never done    DEXA (modify frequency per FRAX score)  Never done    Pneumococcal 65+ years Vaccine (1 of 1 - PPSV23) Never done    Flu vaccine (1) Never done        Current Medications:     Current Outpatient Medications   Medication Sig Dispense Refill    ibuprofen (ADVIL;MOTRIN) 800 MG tablet TAKE 1 TABLET EVERY 8 HOURS AS NEEDED FOR PAIN 270 tablet 3    NONFORMULARY Take 1 tablet by mouth every other day Fiber tabs      atorvastatin (LIPITOR) 10 MG tablet Take 1 tablet by mouth daily 90 tablet 1    ranitidine (ZANTAC) 150 MG tablet TAKE 1 TABLET TWICE A DAY (Patient taking differently: Take 150 mg by mouth daily TAKES 1 TABLET  DAILY) 180 tablet 3    Multiple Vitamins-Minerals (THERAPEUTIC MULTIVITAMIN-MINERALS) tablet Take 1 tablet by mouth daily      vitamin B-12 (CYANOCOBALAMIN) 1000 MCG tablet Take 1,000 mcg by mouth daily       Current Facility-Administered Medications   Medication Dose Route Frequency Provider Last Rate Last Admin    triamcinolone acetonide (KENALOG-40) injection 40 mg  40 mg Intra-articular Once Katia Left, DO           Allergies:   No Known Allergies     Medical History:     Past Medical History:   Diagnosis Date    Endometrial cancer ([address]     Hyperlipidemia     Hypertension     Obese     Umbilical hernia        Past Surgical History:   Procedure Laterality Date    ABDOMINAL EXPLORATION SURGERY  10/19/2016    small bowel resection    APPENDECTOMY  10/19/2016    [facility]    COLONOSCOPY N/A 2021    : COLORECTAL CANCER SCREENING, NOT HIGH RISK (N/A )    COLONOSCOPY N/A 2021    COLONOSCOPY POLYPECTOMY HOT BIOPSY performed by Paras Hardwick DO at [address] [facility]  2021    CYSTOSCOPY TRANSURETHRAL RESECTION BLADDER TUMOR WITH GYRUS    CYSTOSCOPY N/A 2021    CYSTOSCOPY TRANSURETHRAL RESECTION BLADDER TUMOR WITH GYRUS performed by Cathie Daily MD at [facility]      high grade carcinoma-ref to     HYSTERECTOMY      SMALL INTESTINE SURGERY      DAWSON AND BSO  10/19/2016    with pelvic and aorta lymph node dissection, omenectomy    TONSILLECTOMY      UMBILICAL HERNIA REPAIR      UPPER GASTROINTESTINAL ENDOSCOPY         Family History   Problem Relation Age of Onset    Alzheimer's Disease Mother     Dementia Father     Alcohol Abuse Father     Alcohol Abuse Paternal Grandfather         Social History:     Social History     Socioeconomic History    Marital status:       Spouse name: Not on file    Number of children: Not on file    Years of education: Not on file    Highest education level: Not on file   Occupational History    Not on file   Tobacco Use    Smoking status: Former Smoker     Packs/day: 1.00     Years: 10.00     Pack years: 10.00     Types: Cigarettes     Quit date: 1991     Years since quittin.7    Smokeless tobacco: Never Used   Substance and Sexual Activity    Alcohol use: Yes     Comment: rare    Drug use: No    Sexual activity: Yes   Other Topics Concern    Not on file   Social History Narrative    Not on file     Social Determinants of Health     Financial Resource Strain: Low Risk     Difficulty of Paying Living Expenses: Not hard at all   Food Insecurity: No Food Insecurity    Worried About Running Out of Food in the Last Year: Never true    [address] in the Last Year: Never true   Transportation Needs: No Transportation Needs    Lack of Transportation (Medical): No    Lack of Transportation (Non-Medical): No   Physical Activity:     Days of Exercise per Week:     Minutes of Exercise per Session:    Stress:     Feeling of Stress :    Social Connections:     Frequency of Communication with Friends and Family:     Frequency of Social Gatherings with Friends and Family:     Attends [employer] Services:     Active Member of Clubs or Organizations:     Attends Club or Organization Meetings:     Marital Status:    Intimate Partner Violence:     Fear of Current or Ex-Partner:     Emotionally Abused:     Physically Abused:     Sexually Abused:         ROS:     Constitutional: No fevers, chills, fatigue. ENT: No nasal congestion or sore throat  Respiratory: No difficulty in breathing or cough. Cardiovascular: No chest pain, palpitations or shortness of breath  Gastrointestinal: No abdominal pain or change in bowel movements. Genitourinary: No change in urinary frequency or dysuria. Skin: No rashes; +mole neck  Neurological: No weakness. No headaches. MSK: +left knee pain         Last Filed Vitals:  /70 (Site: Left Upper Arm, Position: Sitting, Cuff Size: Large Adult)   Pulse 88   Temp 97.2 °F (36.2 °C) (Temporal)   Wt 200 lb (90.7 kg)   SpO2 97%   BMI 37.79 kg/m²      Physical Examination:     GENERAL APPEARANCE: in no acute distress, well developed, well nourished. HEAD: normocephalic, atraumatic. EYES: extraocular movement intact (EOMI), pupils equal, round, reactive to light and accommodation.    EARS: normal, GOWING, BLADDER TUMOR\" Pink-tan fragments, 0.5 x 0.3 x 0.2 cm  in aggregate. Entirely 1cs. tm      Microscopic Description  Sections of urothelial mucosa and portions of detrusor muscle are  included. There are areas of papillary architecture lined by  urothelium with intact umbrella cells and containing fibrovascular  cores, with mucosal cells less than 7 layers thick. Other fragments  show squamous mucosa without atypia, which may represent proximity to                            the trigone. The nested configuration also shows features consistent  with von Brunn's glands. There is no evidence of notable atypia. Slides were reviewed with additional pathologists (GIANCARLO, DAYANARA) who agree  with the diagnosis. SURGICAL PATHOLOGY CONSULTATION       Patient Name: Erasto Glez [facility] Rec: [id number]  Path Number: [id number]    [facility]  ANATOMIC PATHOLOGY  [address] [place] [address], [place] [address]. [place], [address] [place] [address] [place]  [phone]  Fax: [phone]         No results found for this visit on 09/17/21. Procedure note: left knee injection  Verbal consent obtained. Left knee cleaned in a sterile fashion with betadine. 1cc of 1% lidocaine without epi and 40mg kenalog in a 1:1 ratio injected in a lateral approach into the joint space. No resistance felt. Trace blood loss. Area cleaned with alcohol wipe and band aid applied. Pt tolerated procedure well. Assessment/Plan:     Keaton was seen today for 6 month follow-up, hypertension and knee pain. Diagnoses and all orders for this visit:    Dyslipidemia  -     ALT; Future  -     AST; Future  -     CBC Auto Differential; Future  -     Lipid Panel; Future  -     Magnesium; Future  -     Renal Function Panel; Future    Hyperglycemia  -     Hemoglobin A1C; Future    Chronic pain of left knee  -     XR KNEE LEFT (3 VIEWS);  Future  -     triamcinolone acetonide (KENALOG-40) injection 40 mg  -     [place] ARTHROCENTESIS

## 2021-09-17 NOTE — PATIENT INSTRUCTIONS
Return to clinic in 1 year    Follow up with medical oncologist as planned    Repeat  prior to your next appointment    Follow up with PCP for generalized wellness    Due for screening mammogram in December 2021

## 2021-09-17 NOTE — PROGRESS NOTES
[address] [facility], [facility], [address] [facility] Mook Pillai [address] present for her endometrial cancer surveillance visit. 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 Patients pre operative  antigen level was elevated on two accounts, 72 units on 9/30/16 and 75 units on day of surgery 10/19/16. Final pathology revealed \"high grade mixed cell carcinoma, (90% serous carcinoma and 10% clear cell carcinoma) with inner one-haly myometrial invasion (0.3 CM depth of invasion where myometrium measures 0.8 CM in total thickness). Bilateral fallopian tubes and ovaries negative for malignancy. Bilateral pelvic and para-aortic negative for malignancy. Omentum, appendix, and portion of small bowel negative for malignancy. Pelvic washings were negative for malignancy. FIGO Stage IA high grade mixed cell carcinoma. Per NCCN guidelines, she was recommended to received adjuvant chemotherapy and radiation therapy. She completed 6 cycles of carbo/taxol in April 2017 with Dr. Israel Jackson at [facility]. She declined adjuvant radiation HDR therapy. A CT chest abdomen pelvis obtained post chemotherapy revealed no evidence of metastatic disease, stable benign cyst on right lobe of liver, a 1.2 cm nodule on right thyroid, and tiny left lung nodule. Therefore she subsequently had FNA of thyroid in May 2017,and resulted as benign follicular nodule. Most recent CT chest abdomen pelvis performed on July 7 2020 was overall unchanged from prior.      She continues to follow with her medical oncologist Dr. Aviva Mixon in August 2021 and most recent  normal on 7/14/21 is within normal range at 9.6 units. She is now nearing 5 years since her diagnosis and surgery for uterine cancer. She is planned to see medical oncology in 6 months. Today, she is doing well. She states she is currently moving and this is adding to some stress. She sees her PCP Dr. Yonathan Byrne at 3 pm today. She will follow up on high blood pressure readings. She has had no abdominal or pelvic pain. No vaginal bleeding or discharge. No new lumps or bumps. Normal urination and bowel habits. Denies blood in urine or stool. She had resection of a \"bladder tumor\" with Dr. [facility] in June 2021 which was consistent with papillary cystitis. She is up to date on mammogram screening in December 2020, BIRADS 1. She is up to date on colonoscopy in April 2021. ROS:  I have personally reviewed and agree with the review of systems done by my ancillary staff in the [facility] documentation. Physical Exam:    Vitals:    09/17/21 1005 09/17/21 1012   BP: (!) 164/84 (!) 177/94   Site: Left Upper Arm Left Lower Arm   Position: Sitting Sitting   Cuff Size: Large Adult Medium Adult   Pulse: 74    Temp: 98.1 °F (36.7 °C)    SpO2: 97%    Weight: 200 lb 9.6 oz (91 kg)    Height: 5' 1\" (1.549 m)        General: well- appearing, no acute distress, alert and oriented x 3    HEENT: Thyroid normal size. No cervical or supraclavicular lymphadenopathy. Lungs: Clear to auscultate bilaterally to the bases    No CVA tenderness present bilaterally. Heart: Auscultation reveals regular rate and rhythm. No murmurs or gallops appreciated. Abdomen: Obese, soft. Diffusely non tender to deep palpation. Midline vertical scar present, no herniations. No detectable organomegaly. No masses or ascites palpable. No inguinal lymphadenopathy bilaterally    Extremities: No edema, deformities, or calf tenderness bilaterally. Pelvic: The patient has normal female external genitalia including, vulva, urethra, vagina, perineum, Bartholin glands. Uterus, bilateral fallopian tubes and adnexae, and cervix are surgically absent. Speculum examination reveals no blood in vaginal vault. There is scant discharge, vaginitis swab obtained. The vaginal cuff well-intact with no signs of erythema, induration, separation, or granulation tissue. Bimanual examination: Bladder is non-tender, without mass. There are no palpable vaginal nodules and no other pelvic masses, tenderness or pathology noted. 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 She is s/p DAWSON/BSO and staging biopsies, followed by completion of 6 cycles of carbo/taxol in April 2017. Today, she is now 5 year anniversary since surgery. She has follow up with her medical oncologist in 6 months. Most recent  remains within normal 9.6 units in July 2021. There is no clinical evidence of recurrence or metastasis, therefore she is to remain on surveillance guidelines at this time    Her Surveillance plan is as follows:   1. She is seeing her medical oncologist in 6 months therefore we will have patient return to clinic in 1 year for follow up surveillance since she is now 5 years since her uterine cancer surgery     2. Repeat  prior to next appointment    3. Call or return to clinic sooner with any questions or concerns     I spent 30 minutes providing care to the patient and >50% of the time was spent counseling and coordinating care, discussing the patient's current situation, reviewing her options, counseling and education her and answering her questions.   The patient's pertinent images, labs, and previous records and procedures were reviewed.      Electronically signed by Isai Maldonado PA-C on 9/17/2021 at 10:30 AM [place]

## 2021-09-23 ENCOUNTER — HOSPITAL ENCOUNTER (OUTPATIENT)
Dept: GENERAL RADIOLOGY | Age: 66
Discharge: HOME | End: 2021-09-25
Payer: MEDICARE

## 2021-09-23 ENCOUNTER — HOSPITAL ENCOUNTER (OUTPATIENT)
Age: 66
Discharge: HOME | End: 2021-09-23
Payer: MEDICARE

## 2021-09-23 ENCOUNTER — HOSPITAL ENCOUNTER (OUTPATIENT)
Age: 66
Discharge: HOME | End: 2021-09-25
Payer: MEDICARE

## 2021-09-23 DIAGNOSIS — R73.9 HYPERGLYCEMIA: ICD-10-CM

## 2021-09-23 DIAGNOSIS — M17.12 PRIMARY OSTEOARTHRITIS OF LEFT KNEE: ICD-10-CM

## 2021-09-23 DIAGNOSIS — M25.562 CHRONIC PAIN OF LEFT KNEE: ICD-10-CM

## 2021-09-23 DIAGNOSIS — E78.5 DYSLIPIDEMIA: ICD-10-CM

## 2021-09-23 DIAGNOSIS — G89.29 CHRONIC PAIN OF LEFT KNEE: ICD-10-CM

## 2021-09-23 LAB
ABSOLUTE EOS #: 0.11 K/UL (ref 0–0.44)
ABSOLUTE IMMATURE GRANULOCYTE: <0.03 K/UL (ref 0–0.3)
ABSOLUTE LYMPH #: 1.71 K/UL (ref 1.1–3.7)
ABSOLUTE MONO #: 0.72 K/UL (ref 0.1–1.2)
ALBUMIN SERPL-MCNC: 4.2 G/DL (ref 3.5–5.2)
ALT SERPL-CCNC: 11 U/L (ref 5–33)
ANION GAP SERPL CALCULATED.3IONS-SCNC: 15 MMOL/L (ref 9–17)
AST SERPL-CCNC: 15 U/L
BASOPHILS # BLD: 1 % (ref 0–2)
BASOPHILS ABSOLUTE: 0.06 K/UL (ref 0–0.2)
BUN BLDV-MCNC: 23 MG/DL (ref 8–23)
BUN/CREAT BLD: ABNORMAL (ref 9–20)
CALCIUM SERPL-MCNC: 9.5 MG/DL (ref 8.6–10.4)
CHLORIDE BLD-SCNC: 110 MMOL/L (ref 98–107)
CHOLESTEROL/HDL RATIO: 2.3
CHOLESTEROL: 159 MG/DL
CO2: 21 MMOL/L (ref 20–31)
CREAT SERPL-MCNC: 0.66 MG/DL (ref 0.5–0.9)
DIFFERENTIAL TYPE: NORMAL
EOSINOPHILS RELATIVE PERCENT: 2 % (ref 1–4)
ESTIMATED AVERAGE GLUCOSE: 100 MG/DL
GFR AFRICAN AMERICAN: >60 ML/MIN
GFR NON-AFRICAN AMERICAN: >60 ML/MIN
GFR SERPL CREATININE-BSD FRML MDRD: ABNORMAL ML/MIN/{1.73_M2}
GFR SERPL CREATININE-BSD FRML MDRD: ABNORMAL ML/MIN/{1.73_M2}
GLUCOSE BLD-MCNC: 91 MG/DL (ref 70–99)
HBA1C MFR BLD: 5.1 % (ref 4–6)
HCT VFR BLD CALC: 41.9 % (ref 36.3–47.1)
HDLC SERPL-MCNC: 70 MG/DL
HEMOGLOBIN: 13.1 G/DL (ref 11.9–15.1)
IMMATURE GRANULOCYTES: 0 %
LDL CHOLESTEROL: 73 MG/DL (ref 0–130)
LYMPHOCYTES # BLD: 25 % (ref 24–43)
MAGNESIUM: 2.2 MG/DL (ref 1.6–2.6)
MCH RBC QN AUTO: 29.5 PG (ref 25.2–33.5)
MCHC RBC AUTO-ENTMCNC: 31.3 G/DL (ref 28.4–34.8)
MCV RBC AUTO: 94.4 FL (ref 82.6–102.9)
MONOCYTES # BLD: 10 % (ref 3–12)
NRBC AUTOMATED: 0 PER 100 WBC
PDW BLD-RTO: 13.8 % (ref 11.8–14.4)
PHOSPHORUS: 3.8 MG/DL (ref 2.6–4.5)
PLATELET # BLD: 266 K/UL (ref 138–453)
PLATELET ESTIMATE: NORMAL
PMV BLD AUTO: 12 FL (ref 8.1–13.5)
POTASSIUM SERPL-SCNC: 5.2 MMOL/L (ref 3.7–5.3)
RBC # BLD: 4.44 M/UL (ref 3.95–5.11)
RBC # BLD: NORMAL 10*6/UL
SEG NEUTROPHILS: 62 % (ref 36–65)
SEGMENTED NEUTROPHILS ABSOLUTE COUNT: 4.32 K/UL (ref 1.5–8.1)
SODIUM BLD-SCNC: 146 MMOL/L (ref 135–144)
TRIGL SERPL-MCNC: 81 MG/DL
VLDLC SERPL CALC-MCNC: NORMAL MG/DL (ref 1–30)
WBC # BLD: 6.9 K/UL (ref 3.5–11.3)
WBC # BLD: NORMAL 10*3/UL

## 2021-09-23 PROCEDURE — 73562 X-RAY EXAM OF KNEE 3: CPT

## 2021-09-23 PROCEDURE — 80061 LIPID PANEL: CPT

## 2021-09-23 PROCEDURE — 83036 HEMOGLOBIN GLYCOSYLATED A1C: CPT

## 2021-09-23 PROCEDURE — 80069 RENAL FUNCTION PANEL: CPT

## 2021-09-23 PROCEDURE — 84460 ALANINE AMINO (ALT) (SGPT): CPT

## 2021-09-23 PROCEDURE — 85025 COMPLETE CBC W/AUTO DIFF WBC: CPT

## 2021-09-23 PROCEDURE — 84450 TRANSFERASE (AST) (SGOT): CPT

## 2021-09-23 PROCEDURE — 83735 ASSAY OF MAGNESIUM: CPT

## 2021-09-23 PROCEDURE — 36415 COLL VENOUS BLD VENIPUNCTURE: CPT

## 2021-09-29 RX ORDER — ATORVASTATIN CALCIUM 10 MG/1
TABLET, FILM COATED ORAL
Qty: 90 TABLET | Refills: 1 | Status: SHIPPED | OUTPATIENT
Start: 2021-09-29 | End: 2022-04-04 | Stop reason: SDUPTHER

## 2021-09-29 NOTE — TELEPHONE ENCOUNTER
Adan Woods is calling to request a refill on the following medication(s):    Medication Request:  Requested Prescriptions     Pending Prescriptions Disp Refills    atorvastatin (LIPITOR) 10 MG tablet [Pharmacy Med Name: ATORVASTATIN 10 MG TABLET] 90 tablet 1     Sig: TAKE ONE TABLET BY MOUTH DAILY       Last Visit Date (If Applicable):  4/16/1071    Next Visit Date:    12/17/2021

## 2021-10-07 ENCOUNTER — TELEPHONE (OUTPATIENT)
Dept: FAMILY MEDICINE CLINIC | Age: 66
End: 2021-10-07

## 2021-10-07 NOTE — TELEPHONE ENCOUNTER
Pt would like something for pain in her knees. She would like a \"mild pain pill\" she says when she gets up in the morning she is ok, but she works at [employer] and by the end of the day she can barely walk. She also wants to get a letter for a handicap placard.   Ok to wait til Monday for Dr Bridges Cordell

## 2021-10-21 ENCOUNTER — OFFICE VISIT (OUTPATIENT)
Dept: FAMILY MEDICINE CLINIC | Age: 66
End: 2021-10-21
Payer: MEDICARE

## 2021-10-21 VITALS
OXYGEN SATURATION: 98 % | WEIGHT: 197.4 LBS | HEART RATE: 68 BPM | SYSTOLIC BLOOD PRESSURE: 160 MMHG | BODY MASS INDEX: 37.3 KG/M2 | DIASTOLIC BLOOD PRESSURE: 90 MMHG

## 2021-10-21 DIAGNOSIS — M25.562 CHRONIC PAIN OF LEFT KNEE: Primary | ICD-10-CM

## 2021-10-21 DIAGNOSIS — K21.9 GASTROESOPHAGEAL REFLUX DISEASE WITHOUT ESOPHAGITIS: ICD-10-CM

## 2021-10-21 DIAGNOSIS — Z76.0 MEDICATION REFILL: ICD-10-CM

## 2021-10-21 DIAGNOSIS — G89.29 CHRONIC PAIN OF LEFT KNEE: Primary | ICD-10-CM

## 2021-10-21 DIAGNOSIS — M17.12 PRIMARY OSTEOARTHRITIS OF LEFT KNEE: ICD-10-CM

## 2021-10-21 PROCEDURE — 99214 OFFICE O/P EST MOD 30 MIN: CPT | Performed by: FAMILY MEDICINE

## 2021-10-21 PROCEDURE — G8400 PT W/DXA NO RESULTS DOC: HCPCS | Performed by: FAMILY MEDICINE

## 2021-10-21 PROCEDURE — 1123F ACP DISCUSS/DSCN MKR DOCD: CPT | Performed by: FAMILY MEDICINE

## 2021-10-21 PROCEDURE — 1036F TOBACCO NON-USER: CPT | Performed by: FAMILY MEDICINE

## 2021-10-21 PROCEDURE — 1090F PRES/ABSN URINE INCON ASSESS: CPT | Performed by: FAMILY MEDICINE

## 2021-10-21 PROCEDURE — 3017F COLORECTAL CA SCREEN DOC REV: CPT | Performed by: FAMILY MEDICINE

## 2021-10-21 PROCEDURE — 4040F PNEUMOC VAC/ADMIN/RCVD: CPT | Performed by: FAMILY MEDICINE

## 2021-10-21 PROCEDURE — G8417 CALC BMI ABV UP PARAM F/U: HCPCS | Performed by: FAMILY MEDICINE

## 2021-10-21 PROCEDURE — G8484 FLU IMMUNIZE NO ADMIN: HCPCS | Performed by: FAMILY MEDICINE

## 2021-10-21 PROCEDURE — G8427 DOCREV CUR MEDS BY ELIG CLIN: HCPCS | Performed by: FAMILY MEDICINE

## 2021-10-21 RX ORDER — IBUPROFEN 800 MG/1
TABLET ORAL
Qty: 270 TABLET | Refills: 3 | Status: SHIPPED | OUTPATIENT
Start: 2021-10-21 | End: 2022-04-08 | Stop reason: SDUPTHER

## 2021-10-21 RX ORDER — METHYLPREDNISOLONE 4 MG/1
TABLET ORAL
Qty: 1 KIT | Refills: 0 | Status: SHIPPED | OUTPATIENT
Start: 2021-10-21 | End: 2021-10-27

## 2021-10-21 RX ORDER — TRAMADOL HYDROCHLORIDE 50 MG/1
50 TABLET ORAL 2 TIMES DAILY PRN
Qty: 60 TABLET | Refills: 0 | Status: SHIPPED | OUTPATIENT
Start: 2021-10-21 | End: 2021-11-20

## 2021-10-21 RX ORDER — OMEPRAZOLE 20 MG/1
20 CAPSULE, DELAYED RELEASE ORAL
Qty: 180 CAPSULE | Refills: 1 | Status: SHIPPED | OUTPATIENT
Start: 2021-10-21 | End: 2022-04-08 | Stop reason: SDUPTHER

## 2021-10-21 NOTE — PROGRESS NOTES
Progress Note    Tim Pierce is a 74 y.o.  female who presents today alone for evaluation of   Chief Complaint   Patient presents with    Knee Pain     lt knee           HPI:   Patient is here for follow up on knee pain. Patient reports left knee pain. Patient denies trauma or injury. Patient denies redness or swelling. Patient denies relief with OTC products. Patient states the pain has been present for at least 6 weeks. Patient states the pain is intermittent. Patient denies popping or clicking. Patient did receive kenalog injection at last appt in left knee which did not provide her any relief. Patient is taking rx motrin without relief. Patient would like a rx for a handicap placard. Patient would like to switch to omeprazole from zantac as zantac was recalled. Patient needs refill of motrin. Health Maintenance Due   Topic Date Due    Shingles Vaccine (1 of 2) Never done    DEXA (modify frequency per FRAX score)  Never done    Pneumococcal 65+ years Vaccine (1 of 1 - PPSV23) Never done    Flu vaccine (1) Never done        Current Medications:     Current Outpatient Medications   Medication Sig Dispense Refill    FIBER COMPLETE PO Take by mouth      methylPREDNISolone (MEDROL DOSEPACK) 4 MG tablet Take by mouth. 1 kit 0    traMADol (ULTRAM) 50 MG tablet Take 1 tablet by mouth 2 times daily as needed for Pain for up to 30 days. Intended supply: 7 days. Take lowest dose possible to manage pain 60 tablet 0    omeprazole (PRILOSEC) 20 MG delayed release capsule Take 1 capsule by mouth 2 times daily (before meals) 180 capsule 1    ibuprofen (ADVIL;MOTRIN) 800 MG tablet TAKE 1 TABLET EVERY 8 HOURS AS NEEDED FOR PAIN 270 tablet 3    atorvastatin (LIPITOR) 10 MG tablet TAKE ONE TABLET BY MOUTH DAILY 90 tablet 1    vitamin B-12 (CYANOCOBALAMIN) 1000 MCG tablet Take 1,000 mcg by mouth daily       No current facility-administered medications for this visit.        Allergies:   No Known Allergies     Medical History:     Past Medical History:   Diagnosis Date    Endometrial cancer ([address]     Hyperlipidemia     Hypertension     Obese     Umbilical hernia        Past Surgical History:   Procedure Laterality Date    ABDOMINAL EXPLORATION SURGERY  10/19/2016    small bowel resection    APPENDECTOMY  10/19/2016    [facility]    COLONOSCOPY N/A 2021    : COLORECTAL CANCER SCREENING, NOT HIGH RISK (N/A )    COLONOSCOPY N/A 2021    COLONOSCOPY POLYPECTOMY HOT BIOPSY performed by Maura Manuel DO at [address] [facility]  2021    CYSTOSCOPY TRANSURETHRAL RESECTION BLADDER TUMOR WITH GYRUS    CYSTOSCOPY N/A 2021    CYSTOSCOPY TRANSURETHRAL RESECTION BLADDER TUMOR WITH GYRUS performed by Sindy Miles MD at [facility]      high grade carcinoma-ref to     HYSTERECTOMY      SMALL INTESTINE SURGERY      DAWSON AND BSO  10/19/2016    with pelvic and aorta lymph node dissection, omenectomy    TONSILLECTOMY      UMBILICAL HERNIA REPAIR      UPPER GASTROINTESTINAL ENDOSCOPY         Family History   Problem Relation Age of Onset    Alzheimer's Disease Mother     Dementia Father     Alcohol Abuse Father     Alcohol Abuse Paternal Grandfather         Social History:     Social History     Socioeconomic History    Marital status:       Spouse name: Not on file    Number of children: Not on file    Years of education: Not on file    Highest education level: Not on file   Occupational History    Not on file   Tobacco Use    Smoking status: Former Smoker     Packs/day: 1.00     Years: 10.00     Pack years: 10.00     Types: Cigarettes     Quit date: 1991     Years since quittin.8    Smokeless tobacco: Never Used   Substance and Sexual Activity    Alcohol use: Yes     Comment: rare    Drug use: No    Sexual activity: Yes   Other Topics Concern    Not on file   Social History Narrative    Not on file     Social Determinants of Health     Financial Resource Strain: Low Risk     Difficulty of Paying Living Expenses: Not hard at all   Food Insecurity: No Food Insecurity    Worried About Running Out of Food in the Last Year: Never true    [address] in the Last Year: Never true   Transportation Needs: No Transportation Needs    Lack of Transportation (Medical): No    Lack of Transportation (Non-Medical): No   Physical Activity:     Days of Exercise per Week:     Minutes of Exercise per Session:    Stress:     Feeling of Stress :    Social Connections:     Frequency of Communication with Friends and Family:     Frequency of Social Gatherings with Friends and Family:     Attends Jew Services:     Active Member of Clubs or Organizations:     Attends Club or Organization Meetings:     Marital Status:    Intimate Partner Violence:     Fear of Current or Ex-Partner:     Emotionally Abused:     Physically Abused:     Sexually Abused:         ROS:     Constitutional: No fevers, chills, fatigue. ENT: No nasal congestion or sore throat  Respiratory: No difficulty in breathing or cough. Cardiovascular: No chest pain, palpitations or shortness of breath  Gastrointestinal: No abdominal pain or change in bowel movements. Genitourinary: No change in urinary frequency or dysuria. Skin: No rashes or skin lesions  Neurological: No weakness. No headaches. MSK: +left knee pain         Last Filed Vitals:  BP (!) 160/90   Pulse 68   Wt 197 lb 6.4 oz (89.5 kg)   SpO2 98%   BMI 37.30 kg/m²      Physical Examination:     GENERAL APPEARANCE: in no acute distress, well developed, well nourished. HEAD: normocephalic, atraumatic. EYES: extraocular movement intact (EOMI), pupils equal, round, reactive to light and accommodation. EARS: normal, tympanic membrane intact, clear, auditory canal clear. NOSE: nares patent, no erythema, sinuses nontender bilaterally, no rhinorrhea. ORAL CAVITY: mucosa moist, no lesions. THROAT: clear, no mass, no exudate. NECK/THYROID: neck supple, full range of motion, no thyromegaly. HEART: no murmurs, regular rate and rhythm, S1, S2 normal.   LUNGS: clear to auscultation bilaterally, no wheezes, rales, rhonchi. ABDOMEN: normal, bowel sounds present, soft, nontender, nondistended, no rebound guarding or rigidity  LEFT KNEE: Normal-appearing, no edema or effusion, + tenderness to palpation of medial/lateral joint line, good stability, negative anterior/posterior drawer, negative varus and valgus strain, strength 5 out of 5 bilateral lower extremities  HIP: Negative Fabere      Recent Labs/ In Office Testing/ Radiograph review:     Hospital Outpatient Visit on 09/23/2021   Component Date Value Ref Range Status    Glucose 09/23/2021 91  70 - 99 mg/dL Final    BUN 09/23/2021 23  8 - 23 mg/dL Final    CREATININE 09/23/2021 0.66  0.50 - 0.90 mg/dL Final    Bun/Cre Ratio 09/23/2021 NOT REPORTED  9 - 20 Final    Calcium 09/23/2021 9.5  8.6 - 10.4 mg/dL Final    Albumin 09/23/2021 4.2  3.5 - 5.2 g/dL Final    Phosphorus 09/23/2021 3.8  2.6 - 4.5 mg/dL Final    Sodium 09/23/2021 146* 135 - 144 mmol/L Final    Potassium 09/23/2021 5.2  3.7 - 5.3 mmol/L Final    Chloride 09/23/2021 110* 98 - 107 mmol/L Final    CO2 09/23/2021 21  20 - 31 mmol/L Final    Anion Gap 09/23/2021 15  9 - 17 mmol/L Final    GFR Non- 09/23/2021 >60  >60 mL/min Final    GFR  09/23/2021 >60  >60 mL/min Final    GFR Comment 09/23/2021        Final    Comment: Average GFR for 61-76 years old:   80 mL/min/1.73sq m  Chronic Kidney Disease:   <60 mL/min/1.73sq m  Kidney failure:   <15 mL/min/1.73sq m              eGFR calculated using average adult body mass.  Additional eGFR calculator available at:        [email or web address]            GFR Staging 09/23/2021 NOT REPORTED   Final    Magnesium 09/23/2021 2.2  1.6 - 2.6 mg/dL Final    Cholesterol 09/23/2021 159  <200 mg/dL Final    Comment:    Cholesterol Guidelines:      <200  Desirable   200-240  Borderline      >240  Undesirable         HDL 09/23/2021 70  >40 mg/dL Final    Comment:    HDL Guidelines:    <40     Undesirable   40-59    Borderline    >59     Desirable         LDL Cholesterol 09/23/2021 73  0 - 130 mg/dL Final    Comment:    LDL Guidelines:     <100    Desirable   100-129   Near to/above Desirable   130-159   Borderline      >159   Undesirable     Direct (measured) LDL and calculated LDL are not interchangeable tests.  Chol/HDL Ratio 09/23/2021 2.3  <5 Final            Triglycerides 09/23/2021 81  <150 mg/dL Final    Comment:    Triglyceride Guidelines:     <150   Desirable   150-199  Borderline   200-499  High     >499   Very high   Based on AHA Guidelines for fasting triglyceride, October 2012.  VLDL 09/23/2021 NOT REPORTED  1 - 30 mg/dL Final    Hemoglobin A1C 09/23/2021 5.1  4.0 - 6.0 % Final    Estimated Avg Glucose 09/23/2021 100  mg/dL Final    Comment: The ADA and AACC recommend providing the estimated average glucose result to permit better   patient understanding of their HBA1c result.       WBC 09/23/2021 6.9  3.5 - 11.3 k/uL Final    RBC 09/23/2021 4.44  3.95 - 5.11 m/uL Final    Hemoglobin 09/23/2021 13.1  11.9 - 15.1 g/dL Final    Hematocrit 09/23/2021 41.9  36.3 - 47.1 % Final    MCV 09/23/2021 94.4  82.6 - 102.9 fL Final    MCH 09/23/2021 29.5  25.2 - 33.5 pg Final    MCHC 09/23/2021 31.3  28.4 - 34.8 g/dL Final    RDW 09/23/2021 13.8  11.8 - 14.4 % Final    Platelets 34/96/5455 266  138 - 453 k/uL Final    MPV 09/23/2021 12.0  8.1 - 13.5 fL Final    NRBC Automated 09/23/2021 0.0  0.0 per 100 WBC Final    Differential Type 09/23/2021 NOT REPORTED   Final    Seg Neutrophils 09/23/2021 62  36 - 65 % Final    Lymphocytes 09/23/2021 25  24 - 43 % Final    Monocytes 09/23/2021 10  3 - 12 % Final    Eosinophils % 09/23/2021 2  1 - 4 % Final    Basophils 09/23/2021 1  0 - 2 % Final    Immature Granulocytes 09/23/2021 0  0 % Final    Segs Absolute 09/23/2021 4.32  1.50 - 8.10 k/uL Final    Absolute Lymph # 09/23/2021 1.71  1.10 - 3.70 k/uL Final    Absolute Mono # 09/23/2021 0.72  0.10 - 1.20 k/uL Final    Absolute Eos # 09/23/2021 0.11  0.00 - 0.44 k/uL Final    Basophils Absolute 09/23/2021 0.06  0.00 - 0.20 k/uL Final    Absolute Immature Granulocyte 09/23/2021 <0.03  0.00 - 0.30 k/uL Final    WBC Morphology 09/23/2021 NOT REPORTED   Final    RBC Morphology 09/23/2021 NOT REPORTED   Final    Platelet Estimate 49/14/8370 NOT REPORTED   Final    AST 09/23/2021 15  <32 U/L Final    ALT 09/23/2021 11  5 - 33 U/L Final       No results found for this visit on 10/21/21. Assessment/Plan:     Keaton was seen today for knee pain. Diagnoses and all orders for this visit:    Chronic pain of left knee  -     methylPREDNISolone (MEDROL DOSEPACK) 4 MG tablet; Take by mouth. -     traMADol (ULTRAM) 50 MG tablet; Take 1 tablet by mouth 2 times daily as needed for Pain for up to 30 days. Intended supply: 7 days. Take lowest dose possible to manage pain    Primary osteoarthritis of left knee  -     methylPREDNISolone (MEDROL DOSEPACK) 4 MG tablet; Take by mouth. -     traMADol (ULTRAM) 50 MG tablet; Take 1 tablet by mouth 2 times daily as needed for Pain for up to 30 days. Intended supply: 7 days. Take lowest dose possible to manage pain    Gastroesophageal reflux disease without esophagitis  -     omeprazole (PRILOSEC) 20 MG delayed release capsule; Take 1 capsule by mouth 2 times daily (before meals)    Medication refill  -     ibuprofen (ADVIL;MOTRIN) 800 MG tablet; TAKE 1 TABLET EVERY 8 HOURS AS NEEDED FOR PAIN    Refill as above. Suspect BP elevated 2/2 pain. Will try medrol dose pack and tramadol for acute relief. If no improvement in one month will obtain MRI. Switch to PPI.     All questions answered and addressed to patient satisfaction. Patient understands and agrees to the plan. The patient was evaluated and treated today based on the osteopathic principle that each person is a unit of body, mind, and spirit, the body is capable of self-regulation, self-healing, and health maintenance and that structure and function are reciprocally interrelated. Follow-up:   Return in about 1 month (around 11/21/2021) for knee pain; 20 min appt.       Esther Singh D.O.

## 2021-10-21 NOTE — LETTER
[facility]  [address] [address]  Phone: [phone]  Fax: [phone]    Madhavi Mcneil DO         October 21, 2021     Patient: Marco A Miranda   YOB: 1955   Date of Visit: 10/21/2021       To Whom It May Concern: It is my medical opinion that Ramon Wtason requires a disability parking placard for the following reasons:  She cannot walk 200 feet without stopping to rest.  Duration of need: permanent    If you have any questions or concerns, please don't hesitate to call.     Sincerely,        Madhavi Mcneil DO

## 2022-03-10 ENCOUNTER — OFFICE VISIT (OUTPATIENT)
Dept: FAMILY MEDICINE CLINIC | Age: 67
End: 2022-03-10
Payer: MEDICARE

## 2022-03-10 VITALS
DIASTOLIC BLOOD PRESSURE: 92 MMHG | SYSTOLIC BLOOD PRESSURE: 154 MMHG | BODY MASS INDEX: 37.98 KG/M2 | HEART RATE: 78 BPM | WEIGHT: 201 LBS | OXYGEN SATURATION: 97 %

## 2022-03-10 DIAGNOSIS — R10.32 LEFT LOWER QUADRANT PAIN: Primary | ICD-10-CM

## 2022-03-10 DIAGNOSIS — R10.9 LEFT FLANK PAIN: ICD-10-CM

## 2022-03-10 PROCEDURE — G8427 DOCREV CUR MEDS BY ELIG CLIN: HCPCS | Performed by: STUDENT IN AN ORGANIZED HEALTH CARE EDUCATION/TRAINING PROGRAM

## 2022-03-10 PROCEDURE — 1090F PRES/ABSN URINE INCON ASSESS: CPT | Performed by: STUDENT IN AN ORGANIZED HEALTH CARE EDUCATION/TRAINING PROGRAM

## 2022-03-10 PROCEDURE — G8484 FLU IMMUNIZE NO ADMIN: HCPCS | Performed by: STUDENT IN AN ORGANIZED HEALTH CARE EDUCATION/TRAINING PROGRAM

## 2022-03-10 PROCEDURE — G8400 PT W/DXA NO RESULTS DOC: HCPCS | Performed by: STUDENT IN AN ORGANIZED HEALTH CARE EDUCATION/TRAINING PROGRAM

## 2022-03-10 PROCEDURE — G8417 CALC BMI ABV UP PARAM F/U: HCPCS | Performed by: STUDENT IN AN ORGANIZED HEALTH CARE EDUCATION/TRAINING PROGRAM

## 2022-03-10 PROCEDURE — 99214 OFFICE O/P EST MOD 30 MIN: CPT | Performed by: STUDENT IN AN ORGANIZED HEALTH CARE EDUCATION/TRAINING PROGRAM

## 2022-03-10 PROCEDURE — 1123F ACP DISCUSS/DSCN MKR DOCD: CPT | Performed by: STUDENT IN AN ORGANIZED HEALTH CARE EDUCATION/TRAINING PROGRAM

## 2022-03-10 PROCEDURE — 1036F TOBACCO NON-USER: CPT | Performed by: STUDENT IN AN ORGANIZED HEALTH CARE EDUCATION/TRAINING PROGRAM

## 2022-03-10 PROCEDURE — 3017F COLORECTAL CA SCREEN DOC REV: CPT | Performed by: STUDENT IN AN ORGANIZED HEALTH CARE EDUCATION/TRAINING PROGRAM

## 2022-03-10 PROCEDURE — 4040F PNEUMOC VAC/ADMIN/RCVD: CPT | Performed by: STUDENT IN AN ORGANIZED HEALTH CARE EDUCATION/TRAINING PROGRAM

## 2022-03-10 SDOH — ECONOMIC STABILITY: FOOD INSECURITY: WITHIN THE PAST 12 MONTHS, YOU WORRIED THAT YOUR FOOD WOULD RUN OUT BEFORE YOU GOT MONEY TO BUY MORE.: NEVER TRUE

## 2022-03-10 SDOH — ECONOMIC STABILITY: FOOD INSECURITY: WITHIN THE PAST 12 MONTHS, THE FOOD YOU BOUGHT JUST DIDN'T LAST AND YOU DIDN'T HAVE MONEY TO GET MORE.: NEVER TRUE

## 2022-03-10 ASSESSMENT — PATIENT HEALTH QUESTIONNAIRE - PHQ9
1. LITTLE INTEREST OR PLEASURE IN DOING THINGS: 0
SUM OF ALL RESPONSES TO PHQ9 QUESTIONS 1 & 2: 0
SUM OF ALL RESPONSES TO PHQ QUESTIONS 1-9: 0
2. FEELING DOWN, DEPRESSED OR HOPELESS: 0
SUM OF ALL RESPONSES TO PHQ QUESTIONS 1-9: 0

## 2022-03-10 ASSESSMENT — SOCIAL DETERMINANTS OF HEALTH (SDOH): HOW HARD IS IT FOR YOU TO PAY FOR THE VERY BASICS LIKE FOOD, HOUSING, MEDICAL CARE, AND HEATING?: NOT HARD AT ALL

## 2022-03-10 NOTE — PROGRESS NOTES
Subjective:  Keaton presents for   Chief Complaint   Patient presents with    Flank Pain     lt side radiates to the front for about 3 days        HPI   Here presenting with left-sided pain. States that she has left-sided flank pain, left lower quadrant pain and radiation into her groin. There is tenderness but no difficulty with urination. Denies any dysuria, hematuria, blood in stool or issues with bowel movements. Denies any fever or chills either. Patient Active Problem List   Diagnosis    Hypertension    Hyperlipidemia    Numbness and tingling of right leg    Numbness in right leg    Meralgia paresthetica of right side    Endometrial cancer determined by uterine biopsy ([facility] [address]    10/19/16 TLH/BSO, pelvic and paraaortic lymphadenectomy, omental biopsy, appendectomy, small bowel resection    Drug-induced polyneuropathy ([facility] [address]         Review of Systems   All other systems reviewed and are negative. Objective:  Physical Exam   Vitals:   Vitals:    03/10/22 0910   BP: (!) 154/92   Pulse: 78   SpO2: 97%   Weight: 201 lb (91.2 kg)     Wt Readings from Last 3 Encounters:   03/10/22 201 lb (91.2 kg)   10/21/21 197 lb 6.4 oz (89.5 kg)   09/17/21 200 lb (90.7 kg)     Ht Readings from Last 3 Encounters:   09/17/21 5' 1\" (1.549 m)   06/18/21 5' 1\" (1.549 m)   04/09/21 5' 1.5\" (1.562 m)     Body mass index is 37.98 kg/m². Physical Exam  Constitutional:       General: She is not in acute distress. Appearance: Normal appearance. She is obese. Cardiovascular:      Pulses: Normal pulses. Heart sounds: Normal heart sounds. Pulmonary:      Effort: Pulmonary effort is normal.      Breath sounds: Normal breath sounds. Abdominal:      General: Bowel sounds are normal. There is no distension. Palpations: Abdomen is soft. Tenderness: There is abdominal tenderness. There is left CVA tenderness. There is no right CVA tenderness, guarding or rebound.    Musculoskeletal:         General: Tenderness present. Skin:     General: Skin is warm. Neurological:      Mental Status: She is alert. Assessment/Plan:    Diagnosis Orders   1. Left lower quadrant pain  BUN & Creatinine    CT ABDOMEN PELVIS WO CONTRAST Additional Contrast? None   2. Left flank pain  BUN & Creatinine    CT ABDOMEN PELVIS WO CONTRAST Additional Contrast? None        Return in about 4 weeks (around 4/7/2022). Left lower quadrant pain-has previously been diagnosed with diverticulitis and treated. There is concern for diverticulosis. Will obtain CT abdomen and pelvis with contrast.  Also obtain BUN and creatinine. Left flank pain-given that there is some groin pain and radiation there is also concern for nephrolithiasis. Obtain CT abdomen/pelvis without contrast for this.

## 2022-03-11 ENCOUNTER — HOSPITAL ENCOUNTER (OUTPATIENT)
Dept: CT IMAGING | Facility: CLINIC | Age: 67
Discharge: HOME | End: 2022-03-13
Payer: MEDICARE

## 2022-03-11 DIAGNOSIS — K57.90 DIVERTICULOSIS: Primary | ICD-10-CM

## 2022-03-11 DIAGNOSIS — R10.9 LEFT FLANK PAIN: ICD-10-CM

## 2022-03-11 DIAGNOSIS — R10.32 LEFT LOWER QUADRANT PAIN: ICD-10-CM

## 2022-03-11 PROCEDURE — 74176 CT ABD & PELVIS W/O CONTRAST: CPT

## 2022-03-11 RX ORDER — CIPROFLOXACIN 250 MG/1
250 TABLET, FILM COATED ORAL 2 TIMES DAILY
Qty: 14 TABLET | Refills: 0 | Status: SHIPPED | OUTPATIENT
Start: 2022-03-11 | End: 2022-03-18

## 2022-03-11 RX ORDER — METRONIDAZOLE 500 MG/1
500 TABLET ORAL 2 TIMES DAILY
Qty: 14 TABLET | Refills: 0 | Status: SHIPPED | OUTPATIENT
Start: 2022-03-11 | End: 2022-03-18

## 2022-04-04 RX ORDER — ATORVASTATIN CALCIUM 10 MG/1
10 TABLET, FILM COATED ORAL DAILY
Qty: 90 TABLET | Refills: 1 | Status: SHIPPED | OUTPATIENT
Start: 2022-04-04 | End: 2022-10-04 | Stop reason: SDUPTHER

## 2022-04-04 NOTE — TELEPHONE ENCOUNTER
Jennifer Treviño is calling to request a refill on the following medication(s):    Medication Request:  Requested Prescriptions     Pending Prescriptions Disp Refills    atorvastatin (LIPITOR) 10 MG tablet 90 tablet 1     Sig: Take 1 tablet by mouth daily       Last Visit Date (If Applicable):  13/61/2212    Next Visit Date:    4/8/2022

## 2022-04-08 ENCOUNTER — OFFICE VISIT (OUTPATIENT)
Dept: FAMILY MEDICINE CLINIC | Age: 67
End: 2022-04-08
Payer: MEDICARE

## 2022-04-08 ENCOUNTER — HOSPITAL ENCOUNTER (OUTPATIENT)
Age: 67
Setting detail: SPECIMEN
Discharge: HOME | End: 2022-04-08

## 2022-04-08 VITALS
OXYGEN SATURATION: 97 % | DIASTOLIC BLOOD PRESSURE: 88 MMHG | BODY MASS INDEX: 38.17 KG/M2 | SYSTOLIC BLOOD PRESSURE: 130 MMHG | HEART RATE: 67 BPM | WEIGHT: 202 LBS

## 2022-04-08 DIAGNOSIS — R10.32 CONTINUOUS LLQ ABDOMINAL PAIN: Primary | ICD-10-CM

## 2022-04-08 DIAGNOSIS — K21.9 GASTROESOPHAGEAL REFLUX DISEASE WITHOUT ESOPHAGITIS: ICD-10-CM

## 2022-04-08 DIAGNOSIS — K59.04 CHRONIC IDIOPATHIC CONSTIPATION: ICD-10-CM

## 2022-04-08 DIAGNOSIS — R10.32 CONTINUOUS LLQ ABDOMINAL PAIN: ICD-10-CM

## 2022-04-08 DIAGNOSIS — Z76.0 MEDICATION REFILL: ICD-10-CM

## 2022-04-08 LAB
-: ABNORMAL
ABSOLUTE EOS #: 0.09 K/UL (ref 0–0.44)
ABSOLUTE IMMATURE GRANULOCYTE: 0.05 K/UL (ref 0–0.3)
ABSOLUTE LYMPH #: 2.26 K/UL (ref 1.1–3.7)
ABSOLUTE MONO #: 0.72 K/UL (ref 0.1–1.2)
ALBUMIN SERPL-MCNC: 4.5 G/DL (ref 3.5–5.2)
ALBUMIN SERPL-MCNC: 4.5 G/DL (ref 3.5–5.2)
ALBUMIN/GLOBULIN RATIO: 1.6 (ref 1–2.5)
ALP BLD-CCNC: 33 U/L (ref 35–104)
ALT SERPL-CCNC: 10 U/L (ref 5–33)
AMYLASE: 60 U/L (ref 28–100)
ANION GAP SERPL CALCULATED.3IONS-SCNC: 10 MMOL/L (ref 9–17)
AST SERPL-CCNC: 27 U/L
BASOPHILS # BLD: 1 % (ref 0–2)
BASOPHILS ABSOLUTE: 0.06 K/UL (ref 0–0.2)
BILIRUB SERPL-MCNC: 0.33 MG/DL (ref 0.3–1.2)
BILIRUBIN DIRECT: <0.08 MG/DL
BILIRUBIN URINE: NEGATIVE
BILIRUBIN, INDIRECT: ABNORMAL MG/DL (ref 0–1)
BUN BLDV-MCNC: 20 MG/DL (ref 8–23)
C-REACTIVE PROTEIN: <3 MG/L (ref 0–5)
CALCIUM SERPL-MCNC: 9.9 MG/DL (ref 8.6–10.4)
CHLORIDE BLD-SCNC: 103 MMOL/L (ref 98–107)
CO2: 29 MMOL/L (ref 20–31)
COLOR: YELLOW
CREAT SERPL-MCNC: 0.65 MG/DL (ref 0.5–0.9)
EOSINOPHILS RELATIVE PERCENT: 1 % (ref 1–4)
EPITHELIAL CELLS UA: ABNORMAL /HPF (ref 0–5)
GFR AFRICAN AMERICAN: >60 ML/MIN
GFR NON-AFRICAN AMERICAN: >60 ML/MIN
GFR SERPL CREATININE-BSD FRML MDRD: NORMAL ML/MIN/{1.73_M2}
GLUCOSE BLD-MCNC: 80 MG/DL (ref 70–99)
GLUCOSE URINE: NEGATIVE
HCT VFR BLD CALC: 46.3 % (ref 36.3–47.1)
HEMOGLOBIN: 13.8 G/DL (ref 11.9–15.1)
IMMATURE GRANULOCYTES: 1 %
KETONES, URINE: NEGATIVE
LEUKOCYTE ESTERASE, URINE: ABNORMAL
LIPASE: 40 U/L (ref 13–60)
LYMPHOCYTES # BLD: 26 % (ref 24–43)
MAGNESIUM: 2.3 MG/DL (ref 1.6–2.6)
MCH RBC QN AUTO: 28.8 PG (ref 25.2–33.5)
MCHC RBC AUTO-ENTMCNC: 29.8 G/DL (ref 28.4–34.8)
MCV RBC AUTO: 96.7 FL (ref 82.6–102.9)
MONOCYTES # BLD: 8 % (ref 3–12)
NITRITE, URINE: NEGATIVE
NRBC AUTOMATED: 0 PER 100 WBC
PDW BLD-RTO: 14 % (ref 11.8–14.4)
PH UA: 7.5 (ref 5–8)
PHOSPHORUS: 4.2 MG/DL (ref 2.6–4.5)
PLATELET # BLD: 294 K/UL (ref 138–453)
PMV BLD AUTO: 12 FL (ref 8.1–13.5)
POTASSIUM SERPL-SCNC: 4.9 MMOL/L (ref 3.7–5.3)
PROTEIN UA: NEGATIVE
RBC # BLD: 4.79 M/UL (ref 3.95–5.11)
RBC UA: ABNORMAL /HPF (ref 0–4)
SEDIMENTATION RATE, ERYTHROCYTE: 3 MM/HR (ref 0–30)
SEG NEUTROPHILS: 63 % (ref 36–65)
SEGMENTED NEUTROPHILS ABSOLUTE COUNT: 5.39 K/UL (ref 1.5–8.1)
SODIUM BLD-SCNC: 142 MMOL/L (ref 135–144)
SPECIFIC GRAVITY UA: 1.01 (ref 1–1.03)
TOTAL PROTEIN: 7.3 G/DL (ref 6.4–8.3)
TURBIDITY: CLEAR
URINE HGB: NEGATIVE
UROBILINOGEN, URINE: NORMAL
WBC # BLD: 8.6 K/UL (ref 3.5–11.3)
WBC UA: ABNORMAL /HPF (ref 0–5)

## 2022-04-08 PROCEDURE — 3288F FALL RISK ASSESSMENT DOCD: CPT | Performed by: FAMILY MEDICINE

## 2022-04-08 PROCEDURE — 4040F PNEUMOC VAC/ADMIN/RCVD: CPT | Performed by: FAMILY MEDICINE

## 2022-04-08 PROCEDURE — 1123F ACP DISCUSS/DSCN MKR DOCD: CPT | Performed by: FAMILY MEDICINE

## 2022-04-08 PROCEDURE — 1036F TOBACCO NON-USER: CPT | Performed by: FAMILY MEDICINE

## 2022-04-08 PROCEDURE — G8417 CALC BMI ABV UP PARAM F/U: HCPCS | Performed by: FAMILY MEDICINE

## 2022-04-08 PROCEDURE — 1090F PRES/ABSN URINE INCON ASSESS: CPT | Performed by: FAMILY MEDICINE

## 2022-04-08 PROCEDURE — G8400 PT W/DXA NO RESULTS DOC: HCPCS | Performed by: FAMILY MEDICINE

## 2022-04-08 PROCEDURE — G8427 DOCREV CUR MEDS BY ELIG CLIN: HCPCS | Performed by: FAMILY MEDICINE

## 2022-04-08 PROCEDURE — 99214 OFFICE O/P EST MOD 30 MIN: CPT | Performed by: FAMILY MEDICINE

## 2022-04-08 PROCEDURE — 3017F COLORECTAL CA SCREEN DOC REV: CPT | Performed by: FAMILY MEDICINE

## 2022-04-08 RX ORDER — IBUPROFEN 800 MG/1
TABLET ORAL
Qty: 270 TABLET | Refills: 3 | Status: SHIPPED | OUTPATIENT
Start: 2022-04-08

## 2022-04-08 RX ORDER — OMEPRAZOLE 20 MG/1
20 CAPSULE, DELAYED RELEASE ORAL
Qty: 180 CAPSULE | Refills: 1 | Status: SHIPPED | OUTPATIENT
Start: 2022-04-08

## 2022-04-08 ASSESSMENT — PATIENT HEALTH QUESTIONNAIRE - PHQ9
SUM OF ALL RESPONSES TO PHQ QUESTIONS 1-9: 0
SUM OF ALL RESPONSES TO PHQ9 QUESTIONS 1 & 2: 0
1. LITTLE INTEREST OR PLEASURE IN DOING THINGS: 0
2. FEELING DOWN, DEPRESSED OR HOPELESS: 0
SUM OF ALL RESPONSES TO PHQ QUESTIONS 1-9: 0

## 2022-04-08 NOTE — PROGRESS NOTES
Progress Note    Franklyn Boss is a 69 y.o.  female who presents today alone for evaluation of   Chief Complaint   Patient presents with    Abdominal Pain     f/u. pain has not gotten better. HPI:   Patient is here for follow up on abd pain. Patient saw James Contreras a few weeks ago. Patient had CT abd pelvis which did not reveal any acute pathology. Patient has a history of bowel resection about 5 years ago. Patient reports periumbilical pain that radiates from her umbilicus horizontally to her left flank and spine. Patient states it gets worse with eating and improves with a bowel movement. Patient has a history of constipation since her bowel resection. Patient had colonoscopy 2021 and had a few polyps however no acute findings. Patient denies rash or blistering. Patient denies dysuria. Patient denies blood in stool and/or dark tarry stools. Health Maintenance Due   Topic Date Due    COVID-19 Vaccine (1) Never done    Shingles Vaccine (1 of 2) Never done    DEXA (modify frequency per FRAX score)  Never done    Pneumococcal 65+ years Vaccine (1 of 1 - PPSV23) Never done        Current Medications:     Current Outpatient Medications   Medication Sig Dispense Refill    Plecanatide 3 MG TABS Take once daily 15 tablet 0    omeprazole (PRILOSEC) 20 MG delayed release capsule Take 1 capsule by mouth 2 times daily (before meals) 180 capsule 1    ibuprofen (ADVIL;MOTRIN) 800 MG tablet TAKE 1 TABLET EVERY 8 HOURS AS NEEDED FOR PAIN 270 tablet 3    atorvastatin (LIPITOR) 10 MG tablet Take 1 tablet by mouth daily 90 tablet 1    FIBER COMPLETE PO Take by mouth      vitamin B-12 (CYANOCOBALAMIN) 1000 MCG tablet Take 1,000 mcg by mouth daily       No current facility-administered medications for this visit.        Allergies:   No Known Allergies     Medical History:     Past Medical History:   Diagnosis Date    Endometrial cancer ([facility] [address]     Hyperlipidemia     Hypertension     Obese     Umbilical hernia        Past Surgical History:   Procedure Laterality Date    ABDOMINAL EXPLORATION SURGERY  10/19/2016    small bowel resection    APPENDECTOMY  10/19/2016    COLONOSCOPY      [address] COLONOSCOPY N/A 2021    : COLORECTAL CANCER SCREENING, NOT HIGH RISK (N/A )    COLONOSCOPY N/A 2021    COLONOSCOPY POLYPECTOMY HOT BIOPSY performed by Cheryle Lawson DO at [address] [facility]  2021    CYSTOSCOPY TRANSURETHRAL RESECTION BLADDER TUMOR WITH GYRUS    CYSTOSCOPY N/A 2021    CYSTOSCOPY TRANSURETHRAL RESECTION BLADDER TUMOR WITH GYRUS performed by Kali Jefferson MD at [facility]      high grade carcinoma-ref to     HYSTERECTOMY      SMALL INTESTINE SURGERY      DAWSON AND BSO  10/19/2016    with pelvic and aorta lymph node dissection, omenectomy    TONSILLECTOMY      UMBILICAL HERNIA REPAIR  2010    UPPER GASTROINTESTINAL ENDOSCOPY         Family History   Problem Relation Age of Onset    Alzheimer's Disease Mother     Dementia Father     Alcohol Abuse Father     Alcohol Abuse Paternal Grandfather         Social History:     Social History     Socioeconomic History    Marital status:       Spouse name: Not on file    Number of children: Not on file    Years of education: Not on file    Highest education level: Not on file   Occupational History    Not on file   Tobacco Use    Smoking status: Former Smoker     Packs/day: 1.00     Years: 10.00     Pack years: 10.00     Types: Cigarettes     Quit date: 1991     Years since quittin.2    Smokeless tobacco: Never Used   Substance and Sexual Activity    Alcohol use: Yes     Comment: rare    Drug use: No    Sexual activity: Yes   Other Topics Concern    Not on file   Social History Narrative    Not on file     Social Determinants of Health     Financial Resource Strain: Low Risk     Difficulty of Paying Living Expenses: Not hard at all   Food Insecurity: No Food Insecurity    Worried About Running Out of Food in the Last Year: Never true    Ran Out of Food in the Last Year: Never true   Transportation Needs:     Lack of Transportation (Medical): Not on file    Lack of Transportation (Non-Medical): Not on file   Physical Activity:     Days of Exercise per Week: Not on file    Minutes of Exercise per Session: Not on file   Stress:     Feeling of Stress : Not on file   Social Connections:     Frequency of Communication with Friends and Family: Not on file    Frequency of Social Gatherings with Friends and Family: Not on file    Attends Anabaptism Services: Not on file    Active Member of [address] [place] [address] [employer] or Organizations: Not on file    Attends Club or Organization Meetings: Not on file    Marital Status: Not on file   Intimate Partner Violence:     Fear of Current or Ex-Partner: Not on file    Emotionally Abused: Not on file    Physically Abused: Not on file    Sexually Abused: Not on file   Housing Stability:     Unable to Pay for Housing in the Last Year: Not on file    Number of [place] in the Last Year: Not on file    Unstable Housing in the Last Year: Not on file        ROS:     Constitutional: No fevers, chills, fatigue. ENT: No nasal congestion or sore throat  Respiratory: No difficulty in breathing or cough. Cardiovascular: No chest pain, palpitations or shortness of breath  Gastrointestinal: + abdominal pain; + change in bowel movements. Genitourinary: No change in urinary frequency or dysuria. Skin: No rashes or skin lesions. Neurological: No weakness. No headaches. Last Filed Vitals:  /88   Pulse 67   Wt 202 lb (91.6 kg)   SpO2 97%   BMI 38.17 kg/m²      Physical Examination:     GENERAL APPEARANCE: in no acute distress, well developed, well nourished. HEAD: normocephalic, atraumatic. EYES: extraocular movement intact (EOMI), pupils equal, round, reactive to light and accommodation.    EARS: normal, tympanic membrane intact, clear, auditory canal clear. NOSE: nares patent, no erythema, sinuses nontender bilaterally, no rhinorrhea. ORAL CAVITY: mucosa moist, no lesions. THROAT: clear, no mass, no exudate. NECK/THYROID: neck supple, full range of motion, no thyromegaly. HEART: no murmurs, regular rate and rhythm, S1, S2 normal.   LUNGS: clear to auscultation bilaterally, no wheezes, rales, rhonchi. ABDOMEN: abnormal, bowel sounds present, soft, LLQ TTP, nondistended, no rebound guarding or rigidity    Recent Labs/ In Office Testing/ Radiograph review:     Hospital Outpatient Visit on 09/23/2021   Component Date Value Ref Range Status    Glucose 09/23/2021 91  70 - 99 mg/dL Final    BUN 09/23/2021 23  8 - 23 mg/dL Final    CREATININE 09/23/2021 0.66  0.50 - 0.90 mg/dL Final    Bun/Cre Ratio 09/23/2021 NOT REPORTED  9 - 20 Final    Calcium 09/23/2021 9.5  8.6 - 10.4 mg/dL Final    Albumin 09/23/2021 4.2  3.5 - 5.2 g/dL Final    Phosphorus 09/23/2021 3.8  2.6 - 4.5 mg/dL Final    Sodium 09/23/2021 146* 135 - 144 mmol/L Final    Potassium 09/23/2021 5.2  3.7 - 5.3 mmol/L Final    Chloride 09/23/2021 110* 98 - 107 mmol/L Final    CO2 09/23/2021 21  20 - 31 mmol/L Final    Anion Gap 09/23/2021 15  9 - 17 mmol/L Final    GFR Non- 09/23/2021 >60  >60 mL/min Final    GFR  09/23/2021 >60  >60 mL/min Final    GFR Comment 09/23/2021        Final    Comment: Average GFR for 61-76 years old:   80 mL/min/1.73sq m  Chronic Kidney Disease:   <60 mL/min/1.73sq m  Kidney failure:   <15 mL/min/1.73sq m              eGFR calculated using average adult body mass.  Additional eGFR calculator available at:        [employer]            GFR Staging 09/23/2021 NOT REPORTED   Final    Magnesium 09/23/2021 2.2  1.6 - 2.6 mg/dL Final    Cholesterol 09/23/2021 159  <200 mg/dL Final    Comment:    Cholesterol Guidelines:      <200  Desirable 200-240  Borderline      >240  Undesirable         HDL 09/23/2021 70  >40 mg/dL Final    Comment:    HDL Guidelines:    <40     Undesirable   40-59    Borderline    >59     Desirable         LDL Cholesterol 09/23/2021 73  0 - 130 mg/dL Final    Comment:    LDL Guidelines:     <100    Desirable   100-129   Near to/above Desirable   130-159   Borderline      >159   Undesirable     Direct (measured) LDL and calculated LDL are not interchangeable tests.  Chol/HDL Ratio 09/23/2021 2.3  <5 Final            Triglycerides 09/23/2021 81  <150 mg/dL Final    Comment:    Triglyceride Guidelines:     <150   Desirable   150-199  Borderline   200-499  High     >499   Very high   Based on AHA Guidelines for fasting triglyceride, October 2012.  VLDL 09/23/2021 NOT REPORTED  1 - 30 mg/dL Final    Hemoglobin A1C 09/23/2021 5.1  4.0 - 6.0 % Final    Estimated Avg Glucose 09/23/2021 100  mg/dL Final    Comment: The ADA and AACC recommend providing the estimated average glucose result to permit better   patient understanding of their HBA1c result.       WBC 09/23/2021 6.9  3.5 - 11.3 k/uL Final    RBC 09/23/2021 4.44  3.95 - 5.11 m/uL Final    Hemoglobin 09/23/2021 13.1  11.9 - 15.1 g/dL Final    Hematocrit 09/23/2021 41.9  36.3 - 47.1 % Final    MCV 09/23/2021 94.4  82.6 - 102.9 fL Final    MCH 09/23/2021 29.5  25.2 - 33.5 pg Final    MCHC 09/23/2021 31.3  28.4 - 34.8 g/dL Final    RDW 09/23/2021 13.8  11.8 - 14.4 % Final    Platelets 46/48/0449 266  138 - 453 k/uL Final    MPV 09/23/2021 12.0  8.1 - 13.5 fL Final    NRBC Automated 09/23/2021 0.0  0.0 per 100 WBC Final    Differential Type 09/23/2021 NOT REPORTED   Final    Seg Neutrophils 09/23/2021 62  36 - 65 % Final    Lymphocytes 09/23/2021 25  24 - 43 % Final    Monocytes 09/23/2021 10  3 - 12 % Final    Eosinophils % 09/23/2021 2  1 - 4 % Final    Basophils 09/23/2021 1  0 - 2 % Final    Immature Granulocytes 09/23/2021 0  0 % Final    Segs Absolute 09/23/2021 4.32  1.50 - 8.10 k/uL Final    Absolute Lymph # 09/23/2021 1.71  1.10 - 3.70 k/uL Final    Absolute Mono # 09/23/2021 0.72  0.10 - 1.20 k/uL Final    Absolute Eos # 09/23/2021 0.11  0.00 - 0.44 k/uL Final    Basophils Absolute 09/23/2021 0.06  0.00 - 0.20 k/uL Final    Absolute Immature Granulocyte 09/23/2021 <0.03  0.00 - 0.30 k/uL Final    WBC Morphology 09/23/2021 NOT REPORTED   Final    RBC Morphology 09/23/2021 NOT REPORTED   Final    Platelet Estimate 59/08/7613 NOT REPORTED   Final    AST 09/23/2021 15  <32 U/L Final    ALT 09/23/2021 11  5 - 33 U/L Final       No results found for this visit on 04/08/22. Assessment/Plan:     Keaton was seen today for abdominal pain. Diagnoses and all orders for this visit:    Continuous LLQ abdominal pain  -     Hepatic Function Panel; Future  -     Amylase; Future  -     Lipase; Future  -     Sedimentation Rate; Future  -     C-Reactive Protein; Future  -     CBC with Auto Differential; Future  -     Renal Function Panel; Future  -     Magnesium; Future  -     Urinalysis with Microscopic; Future  -     Culture, Urine; Future    Chronic idiopathic constipation  -     Plecanatide 3 MG TABS; Take once daily    Medication refill  -     ibuprofen (ADVIL;MOTRIN) 800 MG tablet; TAKE 1 TABLET EVERY 8 HOURS AS NEEDED FOR PAIN    Gastroesophageal reflux disease without esophagitis  -     omeprazole (PRILOSEC) 20 MG delayed release capsule; Take 1 capsule by mouth 2 times daily (before meals)    Refills as above. Follow up on labs. Trial of trulance. If no improvement and labs are negative, patient will need to go to GI for further work up. Reviewed CT scan with patient. All questions answered and addressed to patient satisfaction. Patient understands and agrees to the plan.      The patient was evaluated and treated today based on the osteopathic principle that each person is a unit of body, mind, and spirit, the body is capable of self-regulation, self-healing, and health maintenance and that structure and function are reciprocally interrelated. Follow-up:   Return if symptoms worsen or fail to improve.       Tala Ball D.O.

## 2022-04-10 LAB
CULTURE: NORMAL
SPECIMEN DESCRIPTION: NORMAL

## 2022-04-21 ENCOUNTER — TELEPHONE (OUTPATIENT)
Dept: FAMILY MEDICINE CLINIC | Age: 67
End: 2022-04-21

## 2022-04-21 DIAGNOSIS — K59.04 CHRONIC IDIOPATHIC CONSTIPATION: ICD-10-CM

## 2022-04-21 NOTE — TELEPHONE ENCOUNTER
Patient is calling stating she received samples of Trulance from us and she is wondering if you could possibly send her in a RX to her [employer] on file.

## 2022-04-22 ENCOUNTER — TELEPHONE (OUTPATIENT)
Dept: FAMILY MEDICINE CLINIC | Age: 67
End: 2022-04-22

## 2022-04-22 DIAGNOSIS — K59.04 CHRONIC IDIOPATHIC CONSTIPATION: Primary | ICD-10-CM

## 2022-04-22 NOTE — TELEPHONE ENCOUNTER
Received notification from [employer] that Ann-Marie Abdoulayeisaías is not covered by [employer] insurance. Please see attachment for reason and alternatives.

## 2022-04-30 ENCOUNTER — HOSPITAL ENCOUNTER (OUTPATIENT)
Dept: MAMMOGRAPHY | Age: 67
Discharge: HOME | End: 2022-05-02
Payer: MEDICARE

## 2022-04-30 DIAGNOSIS — K57.00 DIVERTICULITIS OF SMALL INTESTINE WITH PERFORATION AND ABSCESS WITHOUT BLEEDING: ICD-10-CM

## 2022-04-30 DIAGNOSIS — C54.1 MALIGNANT NEOPLASM OF ENDOMETRIUM (HCC): ICD-10-CM

## 2022-04-30 DIAGNOSIS — R19.04 LEFT LOWER QUADRANT ABDOMINAL MASS: ICD-10-CM

## 2022-04-30 DIAGNOSIS — R10.9 ABDOMINAL PAIN, UNSPECIFIED ABDOMINAL LOCATION: ICD-10-CM

## 2022-04-30 DIAGNOSIS — J01.80 OTHER ACUTE SINUSITIS, RECURRENCE NOT SPECIFIED: ICD-10-CM

## 2022-04-30 DIAGNOSIS — Z79.899 OTHER LONG TERM (CURRENT) DRUG THERAPY: ICD-10-CM

## 2022-04-30 DIAGNOSIS — D51.3 OTHER DIETARY VITAMIN B12 DEFICIENCY ANEMIA: ICD-10-CM

## 2022-04-30 DIAGNOSIS — K90.89 OTHER SPECIFIED INTESTINAL MALABSORPTION: ICD-10-CM

## 2022-04-30 DIAGNOSIS — R97.8 OTHER ABNORMAL TUMOR MARKERS: ICD-10-CM

## 2022-04-30 DIAGNOSIS — Z12.31 ENCOUNTER FOR SCREENING MAMMOGRAM FOR MALIGNANT NEOPLASM OF BREAST: ICD-10-CM

## 2022-04-30 PROCEDURE — 77063 BREAST TOMOSYNTHESIS BI: CPT

## 2022-09-13 ENCOUNTER — TELEPHONE (OUTPATIENT)
Dept: GYNECOLOGIC ONCOLOGY | Age: 67
End: 2022-09-13

## 2022-09-13 NOTE — TELEPHONE ENCOUNTER
Writer called to r/s 10. 3.22 appt due to provider in surgery, but pt stated that she is currently in chemo tx, and is waiting to schedule some PET scans in the beginning of October. Pt will call back to reschedule appt.

## 2022-10-04 RX ORDER — ATORVASTATIN CALCIUM 10 MG/1
10 TABLET, FILM COATED ORAL DAILY
Qty: 90 TABLET | Refills: 1 | Status: SHIPPED | OUTPATIENT
Start: 2022-10-04

## 2022-10-04 NOTE — TELEPHONE ENCOUNTER
Last visit: 4/8/22 (Dr. Esperanza Hayward)  Last Med refill: 4/4/22  Does patient have enough medication for 72 hours:     Next Visit Date: 11/11/22  Future Appointments   Date Time Provider Brandyn Gutiérrez   11/11/2022  9:30 [facility]   11/11/2022  1:00 PM GARTH Nieves [facility] Maintenance   Topic Date Due    COVID-19 Vaccine (1) Never done    Shingles vaccine (1 of 2) Never done    Pneumococcal 65+ years Vaccine (1 - PCV) Never done    Annual Wellness Visit (AWV)  04/08/2022    Flu vaccine (1) Never done    Lipids  09/23/2022    Depression Screen  04/08/2023    Breast cancer screen  04/30/2024    DTaP/Tdap/Td vaccine (2 - Td or Tdap) 03/29/2026    Colorectal Cancer Screen  04/09/2031    DEXA (modify frequency per FRAX score)  Completed    Hepatitis C screen  Completed    Hepatitis A vaccine  Aged Out    Hepatitis B vaccine  Aged Out    Hib vaccine  Aged Out    Meningococcal (ACWY) vaccine  Aged Out       Hemoglobin A1C (%)   Date Value   09/23/2021 5.1   02/15/2021 5.3             ( goal A1C is < 7)   No results found for: LABMICR  LDL Cholesterol (mg/dL)   Date Value   09/23/2021 73   02/15/2021 98       (goal LDL is <100)   AST (U/L)   Date Value   04/08/2022 27     ALT (U/L)   Date Value   04/08/2022 10     BUN (mg/dL)   Date Value   04/08/2022 20     BP Readings from Last 3 Encounters:   04/08/22 130/88   03/10/22 (!) 154/92   10/21/21 (!) 160/90          (goal 120/80)    All Future Testing planned in [facility]  Lab Frequency Next Occurrence   BUN & Creatinine Once 06/01/2022               Patient Active Problem List:     Hypertension     Hyperlipidemia     Numbness and tingling of right leg     Numbness in right leg     Meralgia paresthetica of right side     Endometrial cancer determined by uterine biopsy ([facility] [address]     10/19/16 TLH/BSO, pelvic and paraaortic lymphadenectomy, omental biopsy, appendectomy, small bowel resection     Drug-induced polyneuropathy

## 2022-10-04 NOTE — TELEPHONE ENCOUNTER
----- Message from Flavia Barnes sent at 10/4/2022  8:59 AM EDT -----  Subject: Message to Provider    QUESTIONS  Information for Provider? Patient is a former patient of Dr Gayle Preciado   and has a new patient appt on 11/11/22 with Maribell Barnes. She will need her   atorvastatin (LIPITOR) 10 MG tablet refilled before her upcoming   appointment if possible.   ---------------------------------------------------------------------------  --------------  Lily Martinez INFO  [phone]; OK to leave message on voicemail  ---------------------------------------------------------------------------  --------------  SCRIPT ANSWERS  Relationship to Patient?  Self

## 2022-11-05 ENCOUNTER — HOSPITAL ENCOUNTER (EMERGENCY)
Age: 67
Discharge: HOME | End: 2022-11-05
Attending: STUDENT IN AN ORGANIZED HEALTH CARE EDUCATION/TRAINING PROGRAM
Payer: MEDICARE

## 2022-11-05 ENCOUNTER — APPOINTMENT (OUTPATIENT)
Dept: GENERAL RADIOLOGY | Age: 67
End: 2022-11-05
Payer: MEDICARE

## 2022-11-05 VITALS
WEIGHT: 186 LBS | HEIGHT: 62 IN | BODY MASS INDEX: 34.23 KG/M2 | HEART RATE: 108 BPM | TEMPERATURE: 97.8 F | DIASTOLIC BLOOD PRESSURE: 120 MMHG | SYSTOLIC BLOOD PRESSURE: 144 MMHG | RESPIRATION RATE: 18 BRPM | OXYGEN SATURATION: 96 %

## 2022-11-05 DIAGNOSIS — Z00.8 ENCOUNTER FOR MEDICAL ASSESSMENT: ICD-10-CM

## 2022-11-05 DIAGNOSIS — D84.9 IMMUNOCOMPROMISED (HCC): ICD-10-CM

## 2022-11-05 DIAGNOSIS — R78.81 POSITIVE BLOOD CULTURE: Primary | ICD-10-CM

## 2022-11-05 LAB
ABSOLUTE EOS #: 0.03 K/UL (ref 0–0.4)
ABSOLUTE IMMATURE GRANULOCYTE: 0 K/UL (ref 0–0.3)
ABSOLUTE LYMPH #: 0.84 K/UL (ref 1–4.8)
ABSOLUTE MONO #: 0.56 K/UL (ref 0.2–0.8)
ALBUMIN SERPL-MCNC: 3.5 G/DL (ref 3.5–5.2)
ALP BLD-CCNC: 33 U/L (ref 35–104)
ALT SERPL-CCNC: 8 U/L (ref 5–33)
ANION GAP SERPL CALCULATED.3IONS-SCNC: 11 MMOL/L (ref 9–17)
AST SERPL-CCNC: 13 U/L
BASOPHILS # BLD: 0 %
BASOPHILS ABSOLUTE: 0 K/UL (ref 0–0.2)
BILIRUB SERPL-MCNC: 0.2 MG/DL (ref 0.3–1.2)
BUN BLDV-MCNC: 24 MG/DL (ref 8–23)
BUN/CREAT BLD: 36 (ref 9–20)
CALCIUM SERPL-MCNC: 8.9 MG/DL (ref 8.6–10.4)
CHLORIDE BLD-SCNC: 110 MMOL/L (ref 98–107)
CO2: 24 MMOL/L (ref 20–31)
CREAT SERPL-MCNC: 0.66 MG/DL (ref 0.5–0.9)
EOSINOPHILS RELATIVE PERCENT: 1 % (ref 1–4)
GFR SERPL CREATININE-BSD FRML MDRD: >60 ML/MIN/1.73M2
GLUCOSE BLD-MCNC: 86 MG/DL (ref 70–99)
HCT VFR BLD CALC: 28.9 % (ref 36.3–47.1)
HEMOGLOBIN: 8.6 G/DL (ref 11.9–15.1)
IMMATURE GRANULOCYTES: 0 %
INR BLD: 0.9
LACTIC ACID, SEPSIS: 1 MMOL/L (ref 0.5–1.9)
LYMPHOCYTES # BLD: 27 % (ref 24–44)
MCH RBC QN AUTO: 33.7 PG (ref 25.2–33.5)
MCHC RBC AUTO-ENTMCNC: 29.8 G/DL (ref 28.4–34.8)
MCV RBC AUTO: 113.3 FL (ref 82.6–102.9)
MONOCYTES # BLD: 18 % (ref 1–7)
MORPHOLOGY: ABNORMAL
MORPHOLOGY: ABNORMAL
NRBC AUTOMATED: 0 PER 100 WBC
PARTIAL THROMBOPLASTIN TIME: 29.8 SEC (ref 23.9–33.8)
PDW BLD-RTO: 15.7 % (ref 11.8–14.4)
PLATELET # BLD: 124 K/UL (ref 138–453)
PMV BLD AUTO: 10.6 FL (ref 8.1–13.5)
POTASSIUM SERPL-SCNC: 3.5 MMOL/L (ref 3.7–5.3)
PROTHROMBIN TIME: 12.4 SEC (ref 11.5–14.2)
RBC # BLD: 2.55 M/UL (ref 3.95–5.11)
SEG NEUTROPHILS: 54 % (ref 36–66)
SEGMENTED NEUTROPHILS ABSOLUTE COUNT: 1.67 K/UL (ref 1.8–7.7)
SODIUM BLD-SCNC: 145 MMOL/L (ref 135–144)
TOTAL PROTEIN: 6.2 G/DL (ref 6.4–8.3)
WBC # BLD: 3.1 K/UL (ref 3.5–11.3)

## 2022-11-05 PROCEDURE — 85610 PROTHROMBIN TIME: CPT

## 2022-11-05 PROCEDURE — 83605 ASSAY OF LACTIC ACID: CPT

## 2022-11-05 PROCEDURE — 80053 COMPREHEN METABOLIC PANEL: CPT

## 2022-11-05 PROCEDURE — 85025 COMPLETE CBC W/AUTO DIFF WBC: CPT

## 2022-11-05 PROCEDURE — 85730 THROMBOPLASTIN TIME PARTIAL: CPT

## 2022-11-05 PROCEDURE — 87040 BLOOD CULTURE FOR BACTERIA: CPT

## 2022-11-05 PROCEDURE — 71045 X-RAY EXAM CHEST 1 VIEW: CPT

## 2022-11-05 PROCEDURE — 6370000000 HC RX 637 (ALT 250 FOR IP): Performed by: STUDENT IN AN ORGANIZED HEALTH CARE EDUCATION/TRAINING PROGRAM

## 2022-11-05 PROCEDURE — 99284 EMERGENCY DEPT VISIT MOD MDM: CPT

## 2022-11-05 RX ORDER — DOXYCYCLINE 100 MG/1
100 CAPSULE ORAL ONCE
Status: COMPLETED | OUTPATIENT
Start: 2022-11-05 | End: 2022-11-05

## 2022-11-05 RX ORDER — DOXYCYCLINE HYCLATE 100 MG
100 TABLET ORAL 2 TIMES DAILY
Qty: 20 TABLET | Refills: 0 | Status: SHIPPED | OUTPATIENT
Start: 2022-11-05 | End: 2022-11-15

## 2022-11-05 RX ADMIN — DOXYCYCLINE 100 MG: 100 CAPSULE ORAL at 07:38

## 2022-11-05 ASSESSMENT — ENCOUNTER SYMPTOMS
EYE REDNESS: 0
SHORTNESS OF BREATH: 0
COLOR CHANGE: 0
EYE DISCHARGE: 0
ABDOMINAL PAIN: 0

## 2022-11-05 ASSESSMENT — PAIN - FUNCTIONAL ASSESSMENT: PAIN_FUNCTIONAL_ASSESSMENT: NONE - DENIES PAIN

## 2022-11-05 NOTE — DISCHARGE INSTRUCTIONS
As discussed if you develop any fevers chills or symptoms similar to last week please return to the ER otherwise follow-up with your oncologist.    Take your medication as indicated and prescribed. If you are given an antibiotic then, make sure you get the prescription filled and take the antibiotics until finished. Drink plenty of water while taking the antibiotics. Avoid drinking alcohol or drinks that have caffeine in it while taking antibiotics. If you were given a prescription for prednisone or any other steroid then, take Pepcid (famotidine - over the counter) every day while you are taking the steroids. If you are a diabetic, you should check your blood sugar more frequently while taking prednisone. For fever or pain use acetaminophen (Tylenol) or ibuprofen (Motrin / Advil), unless prescribed medications that have acetaminophen or ibuprofen (or similar medications) in it. You can take over the counter acetaminophen tablets (1 - 2 tablets of the 500-mg strength every 6 hours) or ibuprofen tablets (2 tablets every 4 hours). PLEASE RETURN TO THE EMERGENCY DEPARTMENT IMMEDIATELY for worsening symptoms, shortness of breathing, change in the amount of sputum that you cough up or a change in the color of your sputum, using your inhaler more frequently or if your inhaler only lasts up to 2 hours (if given an inhaler), or if you develop any concerning symptoms such as: high fever not relieved by acetaminophen (Tylenol) and/or ibuprofen (Motrin / Advil), chills, shortness of breath, chest pain, feeling of your heart fluttering or racing, persistent nausea and/or vomiting, vomiting up blood, blood in your stool, loss of consciousness, numbness, weakness or tingling in the arms or legs or change in color of the extremities, changes in mental status, persistent headache, blurry vision, loss of bladder / bowel control, unable to follow up with your physician, or other any other care or concern.

## 2022-11-05 NOTE — ED PROVIDER NOTES
[facility]  Emergency Department Encounter     Pt Name: Matthew Madrid  MRN: [id number]  [place] 1955  Date of evaluation: 11/5/22  PCP:  Cody Monreal DO    CHIEF COMPLAINT       Chief Complaint   Patient presents with    Abnormal Lab     States that her doctor told her to come in, blood culture drawn Monday at [facility] positive       [address]  (Location/Symptom, Timing/Onset, Context/Setting, Quality, Duration, Modifying Factors,Severity.)      Matthew Madrid is a 69 y.o. female who presents with reported abnormal lab positive blood culture. Patient has history endometrial cancer and has been receiving chemotherapy after hysterectomy. States she had her last chemotherapy session approximately 2 weeks ago. Developed chills, malaise, fatigue, cough, nasal congestion approximately 1 week ago. She states she felt very fatigued and slept a large amount and now feels completely improved. Denies any cough, nasal congestion, nausea, vomiting, chest pain currently. Denies any fevers. She was reportedly called by her oncologist Dr. Joshua Romero early this morning and was told she had a positive blood culture. She reports that a single blood draw was performed. PAST MEDICAL / SURGICAL / SOCIAL / FAMILY HISTORY      has a past medical history of Endometrial cancer ([facility] [address] Hyperlipidemia, Hypertension, Obese, and Umbilical hernia. has a past surgical history that includes Colonoscopy (2011); Upper gastrointestinal endoscopy (2011); Tonsillectomy; Endometrial biopsy (09/12/146); Umbilical hernia repair (2010); Total abdominal hysterectomy w/ bilateral salpingoophorectomy (10/19/2016); Appendectomy (10/19/2016); Abdominal exploration surgery (10/19/2016); Hysterectomy; Small intestine surgery; Colonoscopy (N/A, 04/09/2021); Colonoscopy (N/A, 04/09/2021); Cystoscopy (06/18/2021); and Cystoscopy (N/A, 6/18/2021).     Social History     Socioeconomic History    Marital status:      Spouse name: Not on file    Number of children: Not on file    Years of education: Not on file    Highest education level: Not on file   Occupational History    Not on file   Tobacco Use    Smoking status: Former     Packs/day: 1.00     Years: 10.00     Pack years: 10.00     Types: Cigarettes     Quit date: 1991     Years since quittin.8    Smokeless tobacco: Never   Substance and Sexual Activity    Alcohol use: Yes     Comment: rare    Drug use: No    Sexual activity: Yes   Other Topics Concern    Not on file   Social History Narrative    Not on file     Social Determinants of Health     Financial Resource Strain: Low Risk     Difficulty of Paying Living Expenses: Not hard at all   Food Insecurity: No Food Insecurity    Worried About Running Out of Food in the Last Year: Never true    Ran Out of Food in the Last Year: Never true   Transportation Needs: Not on file   Physical Activity: Not on file   Stress: Not on file   Social Connections: Not on file   Intimate Partner Violence: Not on file   Housing Stability: Not on file       Family History   Problem Relation Age of Onset    Alzheimer's Disease Mother     Dementia Father     Alcohol Abuse Father     Alcohol Abuse Paternal Grandfather        Allergies:  Patient has no known allergies. Home Medications:  Prior to Admission medications    Medication Sig Start Date End Date Taking?  Authorizing Provider   doxycycline hyclate (VIBRA-TABS) 100 MG tablet Take 1 tablet by mouth 2 times daily for 10 days 11/5/22 11/15/22 Yes Liz Parks,    atorvastatin (LIPITOR) 10 MG tablet Take 1 tablet by mouth daily 10/4/22   GARTH Alejandro - CNP   [facility]) 72 MCG CAPS capsule Take 1 capsule by mouth every morning (before breakfast) 22   Reji Ohara, DO   omeprazole (PRILOSEC) 20 MG delayed release capsule Take 1 capsule by mouth 2 times daily (before meals) 22   Reji Ohara, DO   ibuprofen (ADVIL;MOTRIN) 800 MG tablet TAKE 1 TABLET EVERY 8 HOURS AS NEEDED FOR PAIN 4/8/22   Selwyn Hill, DO   FIBER COMPLETE PO Take by mouth    Historical Provider, MD   vitamin B-12 (CYANOCOBALAMIN) 1000 MCG tablet Take 1,000 mcg by mouth daily    Historical Provider, MD       REVIEW OF SYSTEMS    (2-9 systems for level 4, 10 or more for level 5)      Review of Systems   Constitutional:  Negative for chills and fever. Eyes:  Negative for discharge and redness. Respiratory:  Negative for shortness of breath. Cardiovascular:  Negative for chest pain. Gastrointestinal:  Negative for abdominal pain. Genitourinary:  Negative for flank pain. Musculoskeletal:  Negative for myalgias. Skin:  Negative for color change and rash. Allergic/Immunologic: Negative for environmental allergies. Neurological:  Negative for headaches. Psychiatric/Behavioral:  Negative for agitation and confusion. PHYSICAL EXAM   (up to 7 for level 4, 8 or more for level 5)     INITIAL VITALS:    height is 5' 1.5\" (1.562 m) and weight is 186 lb (84.4 kg). Her oral temperature is 97.8 °F (36.6 °C). Her blood pressure is 144/120 (abnormal) and her pulse is 108 (abnormal). Her respiration is 18 and oxygen saturation is 96%. Physical Exam  Vitals and nursing note reviewed. Constitutional:       General: She is not in acute distress. Appearance: She is well-developed. She is not ill-appearing or toxic-appearing. Comments: Extremely well-appearing, no acute distress, nontoxic, speaking in full sentences   HENT:      Head: Normocephalic and atraumatic. Nose: Nose normal.      Mouth/Throat:      Mouth: Mucous membranes are moist.   Eyes:      General: No scleral icterus. Conjunctiva/sclera: Conjunctivae normal.      Pupils: Pupils are equal, round, and reactive to light. Cardiovascular:      Rate and Rhythm: Normal rate and regular rhythm. Heart sounds: Normal heart sounds. No murmur heard. No friction rub. No gallop. Pulmonary:      Effort: Pulmonary effort is normal. No respiratory distress. Breath sounds: Normal breath sounds. No wheezing or rales. Musculoskeletal:         General: Normal range of motion. Skin:     General: Skin is warm and dry. Findings: No erythema or rash. Neurological:      Mental Status: She is alert and oriented to person, place, and time. Psychiatric:         Behavior: Behavior normal.       DIFFERENTIAL  DIAGNOSIS     PLAN (LABS / IMAGING / EKG):  Orders Placed This Encounter   Procedures    Culture, Blood 1    Culture, Blood 1    XR CHEST PORTABLE    CBC with Auto Differential    Protime-INR    APTT    Lactate, Sepsis    Comprehensive Metabolic Panel       MEDICATIONS ORDERED:  Orders Placed This Encounter   Medications    doxycycline monohydrate (MONODOX) capsule 100 mg     Order Specific Question:   Antimicrobial Indications     Answer:   Pneumonia (CAP)    doxycycline hyclate (VIBRA-TABS) 100 MG tablet     Sig: Take 1 tablet by mouth 2 times daily for 10 days     Dispense:  20 tablet     Refill:  0         DDX: Neutropenic fever versus sepsis versus contamination of blood culture    Initial MDM/Plan: 69 y.o. female who presents with reported positive blood culture who just finished last chemotherapy. Extensive discussion with patient she states she has no complaints currently and feels at her baseline. I did discuss with oncologist prior to initiation of any treatment or labs as patient is very well-appearing. Oncologist is requesting typical blood cultures and septic work-up. Oral antibiotics if able be discharged. Did confirm with patient she does not have a indwelling port we will draw 2 peripheral cultures anticipate discharge.     DIAGNOSTIC RESULTS / EMERGENCY DEPARTMENT COURSE / MDM     LABS:  Labs Reviewed   CBC WITH AUTO DIFFERENTIAL - Abnormal; Notable for the following components:       Result Value    WBC 3.1 (*)     RBC 2.55 (*)     Hemoglobin 8.6 (*) Hematocrit 28.9 (*)     .3 (*)     MCH 33.7 (*)     RDW 15.7 (*)     Platelets 604 (*)     Monocytes 18 (*)     Segs Absolute 1.67 (*)     Absolute Lymph # 0.84 (*)     All other components within normal limits   COMPREHENSIVE METABOLIC PANEL - Abnormal; Notable for the following components:    BUN 24 (*)     Bun/Cre Ratio 36 (*)     Sodium 145 (*)     Potassium 3.5 (*)     Chloride 110 (*)     Alkaline Phosphatase 33 (*)     Total Bilirubin 0.2 (*)     Total Protein 6.2 (*)     All other components within normal limits   CULTURE, BLOOD 1   CULTURE, BLOOD 1   PROTIME-INR   APTT   LACTATE, SEPSIS         RADIOLOGY:  XR CHEST PORTABLE    Result Date: 11/5/2022  EXAMINATION: ONE XRAY VIEW OF THE CHEST 11/5/2022 6:32 am COMPARISON: None. HISTORY: ORDERING SYSTEM PROVIDED HISTORY: On chemotherapy, cough TECHNOLOGIST PROVIDED HISTORY: On chemotherapy, cough Reason for Exam: cough 56-year-old female on chemotherapy; coughing FINDINGS: Portable upright view of the chest. Atherosclerotic calcification of the aorta. Mild cardiomegaly. Trachea midline. No pneumothorax. Mild right basilar airspace disease, atelectasis and/or infiltrate. Remainder of the lungs are clear. No pleural effusions. Visualized osseous structures remain unchanged. 1. Mild right basilar airspace disease, atelectasis and/or infiltrate. 2. Mild cardiomegaly. [facility] DEPARTMENT COURSE:  ED Course as of 11/05/22 0735   Sat Nov 05, 2022   0608 Discussed with oncologist who is requesting blood culture from port as well as peripheral blood culture. I did state to oncologist Dr. Shaun Flynn the patient is very well-appearing, states her cough and nasal congestion has resolved. No fever here. She reportedly had a single blood culture drawn at the lab site last week. Requesting oral antibiotic if patient is to be discharged. [MS]   6817 Upon further discussion with patient she does not have a port. We will draw 2 peripheral blood cultures. Anticipate discharge. [MS]   0730 Questionable infiltrate versus atelectasis. Suspect resolving pneumonia as patient has no cough or symptoms currently. Started on doxycycline. Follow-up with oncology [MS]      ED Course User Index  [MS] Sami Avalos DO     Does have some neutropenia and labs consistent with recent chemotherapy admission. Anemic otherwise largely unremarkable. Does have a small infiltrate. Suspect this is lagging behind actual course as patient has no complaints but did have cough and nasal congestion. Blood culture sent, cover with doxycycline. Follow-up with oncology until blood cultures resulted. Again otherwise afebrile. Well-appearing. Will discharge      Based on the low acuity of concerning symptoms and improvement of symptoms, patient will be discharged with follow up and prescription information listed in the Disposition section. Patient states they will follow-up with primary care physician and/or return to the emergency department should they experience a change or worsening of symptoms. Patient will be discharged with resources: summary of visit, instructions, follow-up information, prescriptions if necessary. Patient/ family instructed to read discharge paperwork. All of their questions and concerns were addressed. Suspicion for any acute life-threatening processes is low. Patient voices understanding of plan. PROCEDURES:  None    CONSULTS:  None    CRITICAL CARE:  0    FINAL IMPRESSION      1. Positive blood culture    2. Encounter for medical assessment    3.  Immunocompromised [facility])          DISPOSITION / PLAN     DISPOSITION Decision To Discharge 11/05/2022 07:30:33 AM      PATIENTREFERRED TO:  Maura Valencia, [address] [facility] [place]  [address] [place]    Schedule an appointment as soon as possible for a visit in 1 week      DISCHARGE MEDICATIONS:  New Prescriptions    DOXYCYCLINE HYCLATE (VIBRA-TABS) 100 MG TABLET    Take 1 tablet by mouth 2 times daily for 10 days       Carrie Koroma DO  EmergencyMedicine Attending    (Please note that portions of this note were completed with a voice recognition program.  Efforts were made to edit the dictations but occasionally words are mis-transcribed.)       Carrie Koroma, DO  11/05/22 [facility], DO  11/05/22 [facility], DO  11/05/22 [facility], DO  11/05/22 [id number]

## 2022-11-05 NOTE — ED NOTES
Pt sent here by her oncology doctor for positive blood culture done in outpatient labwork on Monday at [facility]. Pt has a hx of endometrial cancer and last received chemotherapy 2 weeks ago. Pt is afebrile, and denies any pain, chills, or symptoms of feeling ill.         Mario Abbott RN  11/05/22 3339

## 2022-11-10 LAB
CULTURE: NORMAL
CULTURE: NORMAL
Lab: NORMAL
Lab: NORMAL
SPECIMEN DESCRIPTION: NORMAL
SPECIMEN DESCRIPTION: NORMAL

## 2022-11-11 ENCOUNTER — OFFICE VISIT (OUTPATIENT)
Dept: FAMILY MEDICINE CLINIC | Age: 67
End: 2022-11-11
Payer: MEDICARE

## 2022-11-11 ENCOUNTER — OFFICE VISIT (OUTPATIENT)
Dept: GYNECOLOGIC ONCOLOGY | Age: 67
End: 2022-11-11
Payer: MEDICARE

## 2022-11-11 VITALS
SYSTOLIC BLOOD PRESSURE: 130 MMHG | TEMPERATURE: 97.1 F | HEART RATE: 78 BPM | BODY MASS INDEX: 37.79 KG/M2 | WEIGHT: 200 LBS | OXYGEN SATURATION: 95 % | DIASTOLIC BLOOD PRESSURE: 70 MMHG

## 2022-11-11 VITALS
WEIGHT: 190.6 LBS | OXYGEN SATURATION: 99 % | BODY MASS INDEX: 35.98 KG/M2 | HEIGHT: 61 IN | DIASTOLIC BLOOD PRESSURE: 104 MMHG | SYSTOLIC BLOOD PRESSURE: 154 MMHG | HEART RATE: 93 BPM

## 2022-11-11 DIAGNOSIS — C55 METASTASIS FROM CANCER OF UTERUS (HCC): ICD-10-CM

## 2022-11-11 DIAGNOSIS — C79.9 METASTASIS FROM CANCER OF UTERUS (HCC): ICD-10-CM

## 2022-11-11 DIAGNOSIS — Z76.89 ENCOUNTER TO ESTABLISH CARE: Primary | ICD-10-CM

## 2022-11-11 DIAGNOSIS — Z76.0 MEDICATION REFILL: ICD-10-CM

## 2022-11-11 DIAGNOSIS — E87.6 HYPOKALEMIA: ICD-10-CM

## 2022-11-11 DIAGNOSIS — I10 PRIMARY HYPERTENSION: ICD-10-CM

## 2022-11-11 DIAGNOSIS — G62.0 DRUG-INDUCED POLYNEUROPATHY (HCC): ICD-10-CM

## 2022-11-11 DIAGNOSIS — C54.1 ENDOMETRIAL CANCER DETERMINED BY UTERINE BIOPSY (HCC): Primary | ICD-10-CM

## 2022-11-11 DIAGNOSIS — Z13.29 SCREENING FOR THYROID DISORDER: ICD-10-CM

## 2022-11-11 DIAGNOSIS — E66.01 SEVERE OBESITY (BMI 35.0-39.9) WITH COMORBIDITY (HCC): ICD-10-CM

## 2022-11-11 DIAGNOSIS — K21.9 GASTROESOPHAGEAL REFLUX DISEASE WITHOUT ESOPHAGITIS: ICD-10-CM

## 2022-11-11 DIAGNOSIS — E78.00 PURE HYPERCHOLESTEROLEMIA: ICD-10-CM

## 2022-11-11 PROCEDURE — G8427 DOCREV CUR MEDS BY ELIG CLIN: HCPCS

## 2022-11-11 PROCEDURE — G8484 FLU IMMUNIZE NO ADMIN: HCPCS | Performed by: PHYSICIAN ASSISTANT

## 2022-11-11 PROCEDURE — G8400 PT W/DXA NO RESULTS DOC: HCPCS

## 2022-11-11 PROCEDURE — G8427 DOCREV CUR MEDS BY ELIG CLIN: HCPCS | Performed by: PHYSICIAN ASSISTANT

## 2022-11-11 PROCEDURE — G8417 CALC BMI ABV UP PARAM F/U: HCPCS

## 2022-11-11 PROCEDURE — G8417 CALC BMI ABV UP PARAM F/U: HCPCS | Performed by: PHYSICIAN ASSISTANT

## 2022-11-11 PROCEDURE — 3078F DIAST BP <80 MM HG: CPT | Performed by: PHYSICIAN ASSISTANT

## 2022-11-11 PROCEDURE — G8484 FLU IMMUNIZE NO ADMIN: HCPCS

## 2022-11-11 PROCEDURE — 3078F DIAST BP <80 MM HG: CPT

## 2022-11-11 PROCEDURE — 1123F ACP DISCUSS/DSCN MKR DOCD: CPT

## 2022-11-11 PROCEDURE — G8400 PT W/DXA NO RESULTS DOC: HCPCS | Performed by: PHYSICIAN ASSISTANT

## 2022-11-11 PROCEDURE — 1123F ACP DISCUSS/DSCN MKR DOCD: CPT | Performed by: PHYSICIAN ASSISTANT

## 2022-11-11 PROCEDURE — 99215 OFFICE O/P EST HI 40 MIN: CPT | Performed by: PHYSICIAN ASSISTANT

## 2022-11-11 PROCEDURE — 1090F PRES/ABSN URINE INCON ASSESS: CPT

## 2022-11-11 PROCEDURE — 1036F TOBACCO NON-USER: CPT | Performed by: PHYSICIAN ASSISTANT

## 2022-11-11 PROCEDURE — 3074F SYST BP LT 130 MM HG: CPT | Performed by: PHYSICIAN ASSISTANT

## 2022-11-11 PROCEDURE — 3017F COLORECTAL CA SCREEN DOC REV: CPT

## 2022-11-11 PROCEDURE — 1090F PRES/ABSN URINE INCON ASSESS: CPT | Performed by: PHYSICIAN ASSISTANT

## 2022-11-11 PROCEDURE — 99213 OFFICE O/P EST LOW 20 MIN: CPT

## 2022-11-11 PROCEDURE — 3074F SYST BP LT 130 MM HG: CPT

## 2022-11-11 PROCEDURE — 1036F TOBACCO NON-USER: CPT

## 2022-11-11 PROCEDURE — 3017F COLORECTAL CA SCREEN DOC REV: CPT | Performed by: PHYSICIAN ASSISTANT

## 2022-11-11 RX ORDER — OMEPRAZOLE 20 MG/1
20 CAPSULE, DELAYED RELEASE ORAL
Qty: 180 CAPSULE | Refills: 1 | Status: SHIPPED | OUTPATIENT
Start: 2022-11-11

## 2022-11-11 RX ORDER — ATORVASTATIN CALCIUM 10 MG/1
10 TABLET, FILM COATED ORAL DAILY
Qty: 90 TABLET | Refills: 1 | Status: SHIPPED | OUTPATIENT
Start: 2022-11-11

## 2022-11-11 RX ORDER — ANASTROZOLE 1 MG/1
1 TABLET ORAL DAILY
COMMUNITY

## 2022-11-11 RX ORDER — GABAPENTIN 100 MG/1
100 CAPSULE ORAL NIGHTLY
Qty: 60 CAPSULE | Refills: 1 | Status: SHIPPED | OUTPATIENT
Start: 2022-11-11 | End: 2022-12-11

## 2022-11-11 RX ORDER — IBUPROFEN 800 MG/1
TABLET ORAL
Qty: 270 TABLET | Refills: 3 | Status: SHIPPED | OUTPATIENT
Start: 2022-11-11

## 2022-11-11 ASSESSMENT — ENCOUNTER SYMPTOMS
DIARRHEA: 1
ABDOMINAL PAIN: 0
CHEST TIGHTNESS: 0
BLOOD IN STOOL: 0
DIARRHEA: 0
EYES NEGATIVE: 1
SHORTNESS OF BREATH: 0
COLOR CHANGE: 0
NAUSEA: 0
COUGH: 0
BACK PAIN: 1
SORE THROAT: 0
CONSTIPATION: 0
VOMITING: 0
EYE DISCHARGE: 0
RESPIRATORY NEGATIVE: 1
WHEEZING: 0

## 2022-11-11 ASSESSMENT — PATIENT HEALTH QUESTIONNAIRE - PHQ9
SUM OF ALL RESPONSES TO PHQ QUESTIONS 1-9: 0
SUM OF ALL RESPONSES TO PHQ9 QUESTIONS 1 & 2: 0
SUM OF ALL RESPONSES TO PHQ QUESTIONS 1-9: 0
1. LITTLE INTEREST OR PLEASURE IN DOING THINGS: 0
SUM OF ALL RESPONSES TO PHQ QUESTIONS 1-9: 0
2. FEELING DOWN, DEPRESSED OR HOPELESS: 0
SUM OF ALL RESPONSES TO PHQ QUESTIONS 1-9: 0

## 2022-11-11 NOTE — PROGRESS NOTES
French Chen (:  1955) is a 69 y.o. female,Established patient, here for evaluation of the following chief complaint(s):  Establish Care (Previous Provider Cassidy Zayas)          Subjective   SUBJECTIVE/OBJECTIVE:  Pt here today to establish care  VS and weight stable    Pt has been mostly following w/ oncology for the last few months after receiving a dx of endometrial cancer. She recently completed 6 wks of chemo; the week of completion she had fatigue and concerning sx for acute infection. She had blood drawn at [facility] and was called once a blood culture came back positive. Pt went to ED, was evaluated and no acute findings were noted but she was told to complete 10 days of doxy for coverage. Acute anemia shown on ER blood work, unable to see most recent labs from [facility]. Assume this is related to chemo; pt denies black tarry stools, overt bleeding. She has had continued fatigue since all of this. If she is actively focused on a task she can stay awake but is sleeping longer periods at night and finding it hard to stay awake during the day. Will update screening labs, lipids and thyroid due. Potassium was low on ER labs, will recheck. Pt has had persistent poly neuropathy sx s/p chemo. These are bothersome mostly at night and it affects her sleep which could be cause for fatigue. Will trial low dose nightly gabapentin. No other questions or concerns. Review of Systems   Constitutional:  Positive for fatigue. Negative for activity change, appetite change, chills, fever and unexpected weight change. HENT:  Negative for congestion, ear pain and sore throat. Eyes:  Negative for discharge and visual disturbance. Respiratory:  Negative for cough, chest tightness, shortness of breath and wheezing. Cardiovascular:  Negative for chest pain, palpitations and leg swelling.    Gastrointestinal:  Negative for abdominal pain, blood in stool, constipation, diarrhea, nausea and vomiting. Genitourinary:  Negative for dysuria and pelvic pain. Musculoskeletal:  Negative for gait problem and neck pain. Skin:  Negative for color change, rash and wound. Neurological:  Negative for dizziness, seizures, syncope, weakness, light-headedness, numbness and headaches. Psychiatric/Behavioral:  Positive for sleep disturbance. Negative for behavioral problems and confusion. Objective   Physical Exam  Vitals and nursing note reviewed. Constitutional:       General: She is not in acute distress. Appearance: Normal appearance. She is well-developed. She is obese. She is not ill-appearing, toxic-appearing or diaphoretic. HENT:      Head: Normocephalic and atraumatic. Right Ear: External ear normal.      Left Ear: External ear normal.      Nose: Nose normal.   Eyes:      General: No scleral icterus. Right eye: No discharge. Left eye: No discharge. Conjunctiva/sclera: Conjunctivae normal.      Pupils: Pupils are equal, round, and reactive to light. Neck:      Trachea: No tracheal deviation. Cardiovascular:      Rate and Rhythm: Normal rate and regular rhythm. Heart sounds: Normal heart sounds. No murmur heard. No friction rub. No gallop. Pulmonary:      Effort: Pulmonary effort is normal. No tachypnea, accessory muscle usage or respiratory distress. Breath sounds: Normal breath sounds. No stridor. No decreased breath sounds, wheezing, rhonchi or rales. Abdominal:      Palpations: Abdomen is soft. Musculoskeletal:         General: No tenderness or deformity. Normal range of motion. Cervical back: Normal range of motion and neck supple. Right lower leg: No edema. Left lower leg: No edema. Skin:     General: Skin is warm and dry. Coloration: Skin is not pale. Findings: No erythema or rash. Neurological:      Mental Status: She is alert and oriented to person, place, and time. GCS: GCS eye subscore is 4. GCS verbal subscore is 5. GCS motor subscore is 6. Sensory: Sensory deficit (polyneuropathy sx) present. Gait: Gait is intact. Gait normal.   Psychiatric:         Speech: Speech normal.         Behavior: Behavior normal.         Thought Content: Thought content normal.         Judgment: Judgment normal.               ASSESSMENT/PLAN:  1. Encounter to establish care    2. Primary hypertension  -well controlled    -     Comprehensive Metabolic Panel; Future    3. Hypokalemia  -low on ER labs, will recheck    -     Comprehensive Metabolic Panel; Future    4. Drug-induced polyneuropathy (HCC)  -post-chemo, trial low dose nightly gabapentin     -     gabapentin (NEURONTIN) 100 MG capsule; Take 1 capsule by mouth nightly for 30 days. , Disp-60 capsule, R-1Normal    5. Pure hypercholesterolemia  -     Lipid, Fasting; Future  -     atorvastatin (LIPITOR) 10 MG tablet; Take 1 tablet by mouth daily, Disp-90 tablet, R-1Normal    6. Screening for thyroid disorder  -     TSH with Reflex; Future    7. Gastroesophageal reflux disease without esophagitis    -     omeprazole (PRILOSEC) 20 MG delayed release capsule; Take 1 capsule by mouth 2 times daily (before meals), Disp-180 capsule, R-1Normal    8. Medication refill  -     ibuprofen (ADVIL;MOTRIN) 800 MG tablet; TAKE 1 TABLET EVERY 8 HOURS AS NEEDED FOR PAIN, Disp-270 tablet, R-3Normal    Return in about 3 months (around 2/11/2023), or if symptoms worsen or fail to improve. An electronic signature was used to authenticate this note.     --GARTH Zambrano - CNP

## 2022-11-11 NOTE — PROGRESS NOTES
[address] [facility], [facility], [id number] [facility] [place] [address] present for her endometrial cancer surveillance visit. 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 Patients pre operative  antigen level was elevated on two accounts, 72 units on 9/30/16 and 75 units on day of surgery 10/19/16. Final pathology revealed \"high grade mixed cell carcinoma, (90% serous carcinoma and 10% clear cell carcinoma) with inner one-haly myometrial invasion (0.3 CM depth of invasion where myometrium measures 0.8 CM in total thickness). Bilateral fallopian tubes and ovaries negative for malignancy. Bilateral pelvic and para-aortic negative for malignancy. Omentum, appendix, and portion of small bowel negative for malignancy. Pelvic washings were negative for malignancy. FIGO Stage IA high grade mixed cell carcinoma. Per NCCN guidelines, she was recommended to received adjuvant chemotherapy and radiation therapy. She completed 6 cycles of carbo/taxol in April 2017 with Dr. Karli Jiménez at [facility]. She declined adjuvant radiation HDR therapy. A CT chest abdomen pelvis obtained post chemotherapy revealed no evidence of metastatic disease, stable benign cyst on right lobe of liver, a 1.2 cm nodule on right thyroid, and tiny left lung nodule. Therefore she subsequently had FNA of thyroid in May 2017,and resulted as benign follicular nodule. She continue to follow with [facility] gynecologic oncology as well as  her medical oncologist Dr. Camille Goldsmith clinic oncology and noted to been doing well over the past 5 years from her initial surgery and treatment of uterine cancer. Unfortunately in March 2022 the patient began to experience left-sided abdominal discomfort and she was seen by her primary care provider and CT abdomen pelvis obtained on 3/11/2022 revealed no acute intra-abdominal or pelvic abnormality. There is diverticulosis present and she was treated with antibiotic therapy. Her symptoms persisted and she was due to follow-up with her medical oncologist Dr. Veronica Sommers who obtained repeat  and this value had risen to 33.9 units on 4/18/2022. The patient then states she had a PET CT scan (we will work to obtain his results) was told the cancer came back in her abdomen and chest.  She does appear she had a needle biopsy of left abdominal wall mass on 5/13/2022 which revealed \"metastatic carcinoma\"    It was discussed with her medical oncologist foundation 1 testing and PD-L1 testing and HER2 nu test would be obtained. Was recommended the patient would start on aromatase inhibitor while undergoing radiation therapy followed by stopping AI while on systemic chemotherapy using carboplatin and Taxol 4-6 cycles followed by maintenance aromatase inhibitor. Pending [facility] 1 results her maintenance treatment options may be altered.  did further rise to 54.7 units on 6/3/2022. She was seen by radiation oncologist Dr. Lynnette Snyder at [facility] on May 27, 2022 and her pertinent history, imaging and pathology findings were reviewed. She was recommended to receive radiation to the 2 sites of metastasis followed by systemic chemotherapy. She received 3000 cGy to the left mid lateral abdominal wall mass and 3000 cGy to left lower posterior chest wall paraspinal mass over 5 total fractions completed on 6/24/2022. She was started on carboplatin/Taxol on July 11, 2022 and completed 6 cycles on 10/24/2022. Mid treatment PET/CT was obtained on 9/22/2022 after completion of cycle 4 carbo/taxol.   Impression of 2 areas of increased glucose utilization have decrease in activity compared to prior examination. The abdominal wall musculature of the abdomen pelvis follow-up in the second localizes to the bowel soft tissue between the descending thoracic aorta and thoracic abdominal spine in the chest and abdominal junction. There were no new areas of uptake or metastatic deposition. Patient states her most recent  was down to 6 units (we will work to obtain these records)    Today, the patient is doing quite well. She states that she works 2 jobs through all of her treatment. She is excited to retire from 1 job in December. She is scheduled later today for a repeat CT scan and has plans to follow up with Dr. Sri Heck in the week of November 23rd. She denies abdominal or pelvic pain. She has no nausea or vomiting, her appetite is stable. She denies vaginal bleeding or discharge. She states that she previously had cough and shortness of breath and was diagnosed with \"pneumonia\" during an ER visit on 11/5/2022. She is completing doxycycline at this time. She has normal urination and denies dysuria or hematuria. She does have changes in her bowel habits with occasional stress bowel incontinence and does not feel she has been achieving full formed bowel movements as of late. She denies chest pain or shortness of breath today. She is up to date on mammogram screening in May 2 2022, BIRADS 1. She is up to date on colonoscopy in April 2021 with Dr. Luis Felipe Kim. ROS:  I have personally reviewed and agree with the review of systems done by my ancillary staff in the [facility] documentation. Physical Exam:    Vitals:    11/11/22 0911 11/11/22 0918   BP: (!) 161/98 (!) 154/104   Site: Left Upper Arm    Position: Sitting    Cuff Size: Medium Adult    Pulse: 93    SpO2: 99%    Weight: 190 lb 9.6 oz (86.5 kg)    Height: 5' 1\" (1.549 m)        General: well- appearing, no acute distress, alert and oriented x 3    HEENT: Thyroid normal size.  No metastasis completed June 24, 2022. Completed 6 cycles carboplatin/taxol 10/24/2022, current maintenance aromatase inhibitor. Foundation 1 testing, no actionable mutations. HER2 unable to be completed due to insufficient tissue. PDL 1 results? Her Surveillance plan is as follows:   1. Return to clinic in 3-4 months for follow up surveillance  2. Continue active treatment with Arimidex and plans for repeat CT scan. Future recommendations would include a repeat biopsy of any new areas of metastatic disease or if there is persistent residual active disease on future PET/CT in order to obtain HER2 testing and PDL 1 which may further aid treatment options for the patient in the future   3. Reviewed was moderate stool burden palpable on physical exam today, with the patient symptoms and recommend that she use senna tablet 1 to 2 tablets twice daily and eventually decreasing dosing as the  her bowels return to normal.    4. Advised patient to monitor her blood pressure readings and if she remains to have elevated blood pressure readings to follow up with her primary care provider Dr. Christen Billy. 5. Call or return to clinic sooner with any questions or concerns     I spent 45 minutes providing care to the patient spent counseling and coordinating care, discussing the patient's current situation, reviewing her options, counseling and education her and answering her questions. The patient's pertinent images, labs, and previous records and procedures were reviewed.      Electronically signed by Chrystal Almaraz PA-C on 11/11/2022 at 10:15 AM EDT

## 2022-11-11 NOTE — PROGRESS NOTES
Review of Systems   Constitutional:  Positive for fatigue (chemo). HENT:  Negative. Eyes: Negative. Respiratory: Negative. Cardiovascular: Negative. Gastrointestinal:  Positive for diarrhea (bowel leakage). Endocrine: Negative. Genitourinary:  Positive for frequency. Musculoskeletal:  Positive for back pain. Skin: Negative. Neurological:  Positive for numbness (neuropathy). Hematological:  Bruises/bleeds easily.

## 2022-12-07 ENCOUNTER — HOSPITAL ENCOUNTER (OUTPATIENT)
Age: 67
Setting detail: SPECIMEN
Discharge: HOME | End: 2022-12-07

## 2022-12-07 DIAGNOSIS — E78.00 PURE HYPERCHOLESTEROLEMIA: ICD-10-CM

## 2022-12-07 DIAGNOSIS — Z13.29 SCREENING FOR THYROID DISORDER: ICD-10-CM

## 2022-12-07 DIAGNOSIS — E87.6 HYPOKALEMIA: ICD-10-CM

## 2022-12-07 DIAGNOSIS — I10 PRIMARY HYPERTENSION: ICD-10-CM

## 2022-12-07 LAB
ALBUMIN SERPL-MCNC: 4 G/DL (ref 3.5–5.2)
ALBUMIN/GLOBULIN RATIO: 1.5 (ref 1–2.5)
ALP BLD-CCNC: 29 U/L (ref 35–104)
ALT SERPL-CCNC: 11 U/L (ref 5–33)
ANION GAP SERPL CALCULATED.3IONS-SCNC: 17 MMOL/L (ref 9–17)
AST SERPL-CCNC: 19 U/L
BILIRUB SERPL-MCNC: 0.4 MG/DL (ref 0.3–1.2)
BUN BLDV-MCNC: 26 MG/DL (ref 8–23)
CALCIUM SERPL-MCNC: 9.7 MG/DL (ref 8.6–10.4)
CHLORIDE BLD-SCNC: 107 MMOL/L (ref 98–107)
CHOLESTEROL, FASTING: 196 MG/DL
CHOLESTEROL/HDL RATIO: 3.3
CO2: 22 MMOL/L (ref 20–31)
CREAT SERPL-MCNC: 0.63 MG/DL (ref 0.5–0.9)
GFR SERPL CREATININE-BSD FRML MDRD: >60 ML/MIN/1.73M2
GLUCOSE BLD-MCNC: 87 MG/DL (ref 70–99)
HDLC SERPL-MCNC: 59 MG/DL
LDL CHOLESTEROL: 109 MG/DL (ref 0–130)
POTASSIUM SERPL-SCNC: 4.2 MMOL/L (ref 3.7–5.3)
SODIUM BLD-SCNC: 146 MMOL/L (ref 135–144)
TOTAL PROTEIN: 6.6 G/DL (ref 6.4–8.3)
TRIGLYCERIDE, FASTING: 140 MG/DL
TSH SERPL DL<=0.05 MIU/L-ACNC: 0.87 UIU/ML (ref 0.3–5)

## 2023-02-13 ENCOUNTER — OFFICE VISIT (OUTPATIENT)
Dept: FAMILY MEDICINE CLINIC | Age: 68
End: 2023-02-13

## 2023-02-13 VITALS — TEMPERATURE: 97.5 F | HEART RATE: 81 BPM | OXYGEN SATURATION: 99 % | BODY MASS INDEX: 35.98 KG/M2 | WEIGHT: 190.4 LBS

## 2023-02-13 DIAGNOSIS — C55 METASTASIS FROM CANCER OF UTERUS (HCC): ICD-10-CM

## 2023-02-13 DIAGNOSIS — E66.01 SEVERE OBESITY (BMI 35.0-39.9) WITH COMORBIDITY (HCC): ICD-10-CM

## 2023-02-13 DIAGNOSIS — D84.9 IMMUNOCOMPROMISED (HCC): ICD-10-CM

## 2023-02-13 DIAGNOSIS — H61.23 BILATERAL IMPACTED CERUMEN: Primary | ICD-10-CM

## 2023-02-13 DIAGNOSIS — G62.0 DRUG-INDUCED POLYNEUROPATHY (HCC): ICD-10-CM

## 2023-02-13 DIAGNOSIS — H93.8X3 EAR FULLNESS, BILATERAL: ICD-10-CM

## 2023-02-13 DIAGNOSIS — C79.9 METASTASIS FROM CANCER OF UTERUS (HCC): ICD-10-CM

## 2023-02-13 DIAGNOSIS — I10 PRIMARY HYPERTENSION: ICD-10-CM

## 2023-02-13 SDOH — ECONOMIC STABILITY: FOOD INSECURITY: WITHIN THE PAST 12 MONTHS, THE FOOD YOU BOUGHT JUST DIDN'T LAST AND YOU DIDN'T HAVE MONEY TO GET MORE.: NEVER TRUE

## 2023-02-13 SDOH — ECONOMIC STABILITY: INCOME INSECURITY: HOW HARD IS IT FOR YOU TO PAY FOR THE VERY BASICS LIKE FOOD, HOUSING, MEDICAL CARE, AND HEATING?: NOT HARD AT ALL

## 2023-02-13 SDOH — ECONOMIC STABILITY: HOUSING INSECURITY
IN THE LAST 12 MONTHS, WAS THERE A TIME WHEN YOU DID NOT HAVE A STEADY PLACE TO SLEEP OR SLEPT IN A SHELTER (INCLUDING NOW)?: NO

## 2023-02-13 SDOH — ECONOMIC STABILITY: FOOD INSECURITY: WITHIN THE PAST 12 MONTHS, YOU WORRIED THAT YOUR FOOD WOULD RUN OUT BEFORE YOU GOT MONEY TO BUY MORE.: NEVER TRUE

## 2023-02-13 ASSESSMENT — PATIENT HEALTH QUESTIONNAIRE - PHQ9
SUM OF ALL RESPONSES TO PHQ QUESTIONS 1-9: 0
SUM OF ALL RESPONSES TO PHQ9 QUESTIONS 1 & 2: 0
SUM OF ALL RESPONSES TO PHQ QUESTIONS 1-9: 0
SUM OF ALL RESPONSES TO PHQ QUESTIONS 1-9: 0
2. FEELING DOWN, DEPRESSED OR HOPELESS: 0
1. LITTLE INTEREST OR PLEASURE IN DOING THINGS: 0
SUM OF ALL RESPONSES TO PHQ QUESTIONS 1-9: 0

## 2023-02-13 ASSESSMENT — ENCOUNTER SYMPTOMS
EYE DISCHARGE: 0
COLOR CHANGE: 0
ABDOMINAL PAIN: 0
COUGH: 0
NAUSEA: 0
CHEST TIGHTNESS: 0
SORE THROAT: 0
VOMITING: 0
DIARRHEA: 0
CONSTIPATION: 0
SHORTNESS OF BREATH: 0
WHEEZING: 0

## 2023-02-13 NOTE — PROGRESS NOTES
Jannette Bailey (:  1955) is a 77 y.o. female,Established patient, here for evaluation of the following chief complaint(s):  Hypertension and Ear Fullness          Subjective   SUBJECTIVE/OBJECTIVE:  Pt here today for 3 month f/u for BP  BP well controlled today in office, pt does not monitor at home    Since last OV pt has completed chemo and radiation, she is on oral maintenance med at this time  She continues to follow w/ oncology every 3 months, she is hopeful for discussion of possible surgical removal of tumor that is still palpable to left lower flank. Pt states this is tender at times and she still feels it which bothers her. C/o intermittent dizziness, she has experienced this in the past d/t her Arimidex, occurring more frequently paired w/ ear fullness. Bilateral ears impacted w/ cerumen on exam, will irrigate and reassess. Review of Systems   Constitutional:  Negative for activity change, appetite change, chills, fatigue, fever and unexpected weight change. HENT:  Positive for hearing loss. Negative for congestion, ear pain and sore throat. Eyes:  Negative for discharge and visual disturbance. Respiratory:  Negative for cough, chest tightness, shortness of breath and wheezing. Cardiovascular:  Negative for chest pain, palpitations and leg swelling. Gastrointestinal:  Negative for abdominal pain, constipation, diarrhea, nausea and vomiting. Genitourinary:  Negative for dysuria and pelvic pain. Musculoskeletal:  Negative for gait problem and neck pain. Skin:  Negative for color change, rash and wound. Neurological:  Positive for dizziness. Negative for seizures, syncope, weakness, light-headedness, numbness and headaches. Psychiatric/Behavioral:  Negative for behavioral problems, confusion and sleep disturbance. Objective   Physical Exam  Vitals and nursing note reviewed. Constitutional:       General: She is not in acute distress.      Appearance: Normal appearance. She is well-developed. She is not ill-appearing, toxic-appearing or diaphoretic. HENT:      Head: Normocephalic and atraumatic. Right Ear: External ear normal. Decreased hearing noted. There is impacted cerumen. Left Ear: External ear normal. Decreased hearing noted. There is impacted cerumen. Nose: Nose normal.   Eyes:      General: No scleral icterus. Right eye: No discharge. Left eye: No discharge. Conjunctiva/sclera: Conjunctivae normal.      Pupils: Pupils are equal, round, and reactive to light. Neck:      Vascular: No carotid bruit. Trachea: No tracheal deviation. Cardiovascular:      Rate and Rhythm: Normal rate and regular rhythm. Heart sounds: Normal heart sounds. No murmur heard. No friction rub. No gallop. Pulmonary:      Effort: Pulmonary effort is normal. No tachypnea, accessory muscle usage or respiratory distress. Breath sounds: Normal breath sounds. No stridor. No decreased breath sounds, wheezing, rhonchi or rales. Abdominal:      Palpations: Abdomen is soft. Musculoskeletal:         General: No tenderness or deformity. Normal range of motion. Cervical back: Normal range of motion and neck supple. Right lower leg: No edema. Left lower leg: No edema. Skin:     General: Skin is warm and dry. Coloration: Skin is not pale. Findings: No erythema or rash. Neurological:      Mental Status: She is alert and oriented to person, place, and time. GCS: GCS eye subscore is 4. GCS verbal subscore is 5. GCS motor subscore is 6. Gait: Gait is intact. Gait normal.   Psychiatric:         Speech: Speech normal.         Behavior: Behavior normal.         Thought Content: Thought content normal.         Judgment: Judgment normal.               ASSESSMENT/PLAN:  1. Bilateral impacted cerumen  2.  Ear fullness, bilateral  -clear after irrigation  -encouraged pt to monitor and update if she is having persistent sx    -     NC REMOVAL IMPACTED CERUMEN IRRIGATION/LVG UNILAT    3. Metastasis from cancer of uterus ([facility] [address]  4. Immunocompromised ([address]  -stable, pt following q3 months w/ oncology  -no acute changes in plan of care at this time    5. Severe obesity (BMI 35.0-39. 9) with comorbidity ([address]    6. Drug-induced polyneuropathy ([facility] [address]  -improving as pt is not longer receiving chemo infusions  -controlled w/ PRN gabapentin     7. Primary hypertension  -well controlled off meds  -continue to monitor    Return in about 6 months (around 8/13/2023), or if symptoms worsen or fail to improve, for chronic conditions. An electronic signature was used to authenticate this note.     --GARTH Cronin - CNP

## 2023-03-02 DIAGNOSIS — G62.0 DRUG-INDUCED POLYNEUROPATHY (HCC): ICD-10-CM

## 2023-03-02 RX ORDER — GABAPENTIN 100 MG/1
CAPSULE ORAL
Qty: 60 CAPSULE | Refills: 1 | Status: SHIPPED | OUTPATIENT
Start: 2023-03-02 | End: 2023-05-01

## 2023-03-02 NOTE — TELEPHONE ENCOUNTER
Joe Mariee is calling to request a refill on the following medication(s):    Medication Request:  Requested Prescriptions     Pending Prescriptions Disp Refills    gabapentin (NEURONTIN) 100 MG capsule [Pharmacy Med Name: GABAPENTIN 100 MG CAPSULE] 60 capsule 1     Sig: TAKE ONE CAPSULE BY MOUTH ONCE NIGHTLY       Last Visit Date (If Applicable):  7/13/0884    Next Visit Date:    8/15/2023

## 2023-04-03 ENCOUNTER — TELEPHONE (OUTPATIENT)
Dept: GYNECOLOGIC ONCOLOGY | Age: 68
End: 2023-04-03

## 2023-04-03 NOTE — TELEPHONE ENCOUNTER
Writer called to reschedule canceled appt. Pt states that she does not want to r/s at this time because a new tumor has been discovered and she has a consult with a Gyn Onc provider today at [facility]. Please call pt with any concerns.

## 2023-04-05 NOTE — TELEPHONE ENCOUNTER
I have called this patient, unable to leave voicemail as voicemail box full. Calling the patient to let her know we are here for her as her GYN oncology service to meet with Dr. Jeny Wahl for discussion of her disease status in addition certainly to second opinion consultation as she wishes.

## 2023-06-07 ENCOUNTER — OFFICE VISIT (OUTPATIENT)
Dept: GYNECOLOGIC ONCOLOGY | Age: 68
End: 2023-06-07
Payer: MEDICARE

## 2023-06-07 VITALS
WEIGHT: 190 LBS | OXYGEN SATURATION: 95 % | HEIGHT: 61 IN | SYSTOLIC BLOOD PRESSURE: 168 MMHG | BODY MASS INDEX: 35.87 KG/M2 | HEART RATE: 86 BPM | DIASTOLIC BLOOD PRESSURE: 94 MMHG

## 2023-06-07 DIAGNOSIS — K21.9 GERD WITHOUT ESOPHAGITIS: ICD-10-CM

## 2023-06-07 DIAGNOSIS — C55 METASTASIS FROM CANCER OF UTERUS (HCC): Primary | ICD-10-CM

## 2023-06-07 DIAGNOSIS — C79.9 METASTASIS FROM CANCER OF UTERUS (HCC): Primary | ICD-10-CM

## 2023-06-07 DIAGNOSIS — I10 PRIMARY HYPERTENSION: ICD-10-CM

## 2023-06-07 DIAGNOSIS — R10.9 LEFT SIDED ABDOMINAL PAIN: ICD-10-CM

## 2023-06-07 DIAGNOSIS — G89.3 CANCER RELATED PAIN: ICD-10-CM

## 2023-06-07 DIAGNOSIS — M85.859 OSTEOPENIA OF HIP, UNSPECIFIED LATERALITY: ICD-10-CM

## 2023-06-07 DIAGNOSIS — K59.00 CONSTIPATION, UNSPECIFIED CONSTIPATION TYPE: ICD-10-CM

## 2023-06-07 PROCEDURE — 3078F DIAST BP <80 MM HG: CPT | Performed by: OBSTETRICS & GYNECOLOGY

## 2023-06-07 PROCEDURE — 1090F PRES/ABSN URINE INCON ASSESS: CPT | Performed by: OBSTETRICS & GYNECOLOGY

## 2023-06-07 PROCEDURE — 99205 OFFICE O/P NEW HI 60 MIN: CPT | Performed by: OBSTETRICS & GYNECOLOGY

## 2023-06-07 PROCEDURE — 1123F ACP DISCUSS/DSCN MKR DOCD: CPT | Performed by: OBSTETRICS & GYNECOLOGY

## 2023-06-07 PROCEDURE — G8417 CALC BMI ABV UP PARAM F/U: HCPCS | Performed by: OBSTETRICS & GYNECOLOGY

## 2023-06-07 PROCEDURE — 1036F TOBACCO NON-USER: CPT | Performed by: OBSTETRICS & GYNECOLOGY

## 2023-06-07 PROCEDURE — G2212 PROLONG OUTPT/OFFICE VIS: HCPCS | Performed by: OBSTETRICS & GYNECOLOGY

## 2023-06-07 PROCEDURE — 3017F COLORECTAL CA SCREEN DOC REV: CPT | Performed by: OBSTETRICS & GYNECOLOGY

## 2023-06-07 PROCEDURE — 3074F SYST BP LT 130 MM HG: CPT | Performed by: OBSTETRICS & GYNECOLOGY

## 2023-06-07 PROCEDURE — G8427 DOCREV CUR MEDS BY ELIG CLIN: HCPCS | Performed by: OBSTETRICS & GYNECOLOGY

## 2023-06-07 PROCEDURE — G8400 PT W/DXA NO RESULTS DOC: HCPCS | Performed by: OBSTETRICS & GYNECOLOGY

## 2023-06-07 RX ORDER — LIDOCAINE 50 MG/G
1 PATCH TOPICAL DAILY
COMMUNITY

## 2023-06-07 RX ORDER — ACETAMINOPHEN 500 MG
1000 TABLET ORAL DAILY
COMMUNITY

## 2023-06-07 RX ORDER — OMEPRAZOLE 20 MG/1
20 CAPSULE, DELAYED RELEASE ORAL DAILY
Qty: 180 CAPSULE | Refills: 1 | Status: SHIPPED
Start: 2023-06-07 | End: 2023-06-07 | Stop reason: SDUPTHER

## 2023-06-07 ASSESSMENT — ENCOUNTER SYMPTOMS
DIARRHEA: 1
CONSTIPATION: 1
EYES NEGATIVE: 1
RESPIRATORY NEGATIVE: 1

## 2023-06-24 DIAGNOSIS — G62.0 DRUG-INDUCED POLYNEUROPATHY (HCC): ICD-10-CM

## 2023-06-26 RX ORDER — GABAPENTIN 100 MG/1
CAPSULE ORAL
Qty: 60 CAPSULE | Refills: 1 | Status: SHIPPED | OUTPATIENT
Start: 2023-06-26 | End: 2023-08-25

## 2023-06-30 DIAGNOSIS — G62.0 DRUG-INDUCED POLYNEUROPATHY (HCC): ICD-10-CM

## 2023-06-30 RX ORDER — GABAPENTIN 100 MG/1
CAPSULE ORAL
Qty: 60 CAPSULE | Refills: 1 | OUTPATIENT
Start: 2023-06-30 | End: 2023-08-29

## 2023-07-12 ENCOUNTER — HOSPITAL ENCOUNTER (OUTPATIENT)
Dept: MAMMOGRAPHY | Age: 68
Discharge: HOME | End: 2023-07-14
Payer: MEDICARE

## 2023-07-12 DIAGNOSIS — Z12.31 ENCOUNTER FOR SCREENING MAMMOGRAM FOR BREAST CANCER: ICD-10-CM

## 2023-07-12 PROCEDURE — 77063 BREAST TOMOSYNTHESIS BI: CPT

## 2023-07-13 PROCEDURE — 99358 PROLONG SERVICE W/O CONTACT: CPT | Performed by: OBSTETRICS & GYNECOLOGY

## 2023-07-17 PROCEDURE — 99358 PROLONG SERVICE W/O CONTACT: CPT | Performed by: OBSTETRICS & GYNECOLOGY

## 2023-07-18 PROCEDURE — 99358 PROLONG SERVICE W/O CONTACT: CPT | Performed by: OBSTETRICS & GYNECOLOGY

## 2023-07-20 ENCOUNTER — OFFICE VISIT (OUTPATIENT)
Dept: GYNECOLOGIC ONCOLOGY | Age: 68
End: 2023-07-20

## 2023-07-20 VITALS
DIASTOLIC BLOOD PRESSURE: 82 MMHG | SYSTOLIC BLOOD PRESSURE: 129 MMHG | WEIGHT: 194.4 LBS | OXYGEN SATURATION: 98 % | HEART RATE: 82 BPM | BODY MASS INDEX: 36.7 KG/M2 | HEIGHT: 61 IN

## 2023-07-20 DIAGNOSIS — K59.00 CONSTIPATION, UNSPECIFIED CONSTIPATION TYPE: ICD-10-CM

## 2023-07-20 DIAGNOSIS — C55 METASTASIS FROM CANCER OF UTERUS (HCC): Primary | ICD-10-CM

## 2023-07-20 DIAGNOSIS — E66.01 SEVERE OBESITY (BMI 35.0-39.9) WITH COMORBIDITY (HCC): ICD-10-CM

## 2023-07-20 DIAGNOSIS — R10.9 LEFT SIDED ABDOMINAL PAIN: ICD-10-CM

## 2023-07-20 DIAGNOSIS — C79.9 METASTASIS FROM CANCER OF UTERUS (HCC): Primary | ICD-10-CM

## 2023-07-20 DIAGNOSIS — K21.9 GERD WITHOUT ESOPHAGITIS: ICD-10-CM

## 2023-07-20 DIAGNOSIS — I10 PRIMARY HYPERTENSION: ICD-10-CM

## 2023-07-20 DIAGNOSIS — M85.859 OSTEOPENIA OF HIP, UNSPECIFIED LATERALITY: ICD-10-CM

## 2023-07-20 DIAGNOSIS — G89.3 CANCER RELATED PAIN: ICD-10-CM

## 2023-07-20 ASSESSMENT — ENCOUNTER SYMPTOMS
GASTROINTESTINAL NEGATIVE: 1
EYES NEGATIVE: 1
RESPIRATORY NEGATIVE: 1

## 2023-07-20 NOTE — PROGRESS NOTES
Review of Systems   Constitutional: Negative. HENT:  Negative. Eyes: Negative. Respiratory: Negative. Cardiovascular: Negative. Gastrointestinal: Negative. Endocrine: Positive for hot flashes (night sweats). Musculoskeletal:  Positive for flank pain. Skin: Negative. Neurological:  Positive for dizziness (every once in a while) and numbness (tingling in left leg). Hematological:  Bruises/bleeds easily.
nodularity localized to the thyroid, etiology indeterminant  Small anterior cranial nodes  Small to moderate hiatal hernia  Focal area of low density identified inferolateral right lobe of the liver. Present on prior study on 04/07/17 and unchanged in size and location, measured at approximately 7mm  The colon demonstrates mild to moderate air and stool retention with mild colonic attention diffuse  No pericolonic fascial inflammatory changes  No evidence for mesenteric lymphadenopathy nor is there evidence of retroperitoneal or para-aortic node enlargement  Prominent anterior vascular plaque disposition within the abdominal aorta.    There is a subrenal abdominal aortic aneurysm with maximal transverse diameter now estimated to measure 2.1 cm in size  Moderate air and stool retention identified throughout the colon  Mild to moderate diverticulosis identified within the colon  Mild enlargement of the inguinal canals containing preperitoneal fat (left greater than right)  Broad-based diastases subumbilical ventral abdominal wall  Mild to moderate air and stool retention throughout the colon  Mild to moderate sclerosis with anterior osteophytic features diffusely involving the lower cervical thoracic junction of the spine and mid thoracic spine with mild height loss  CT-scan CAP (01/15/19)  Based on visit note by Dr. Wander Jaimes on 07/08/19  Small cyst in the right lobe of the liver is unchanged  2.1cm infrarenal abdominal aortic aneurysm is unchanged  Small amount of volume loss / atelectasis in the anterior aspect of the right mid to lower lung is new  CT-scan CAP (IV contrast) (07/07/20)  COMPARISON: CT-scan CAP 07/09/19  Enlargement of the thyroid, probable right lobe of thyroid nodularity unchanged  Prominent plaque thoracic aortic arch  Calcified granuloma right paratracheal region benign  Small to moderate paraesophageal type hernia suspected  Linear density involving left lobe pleural lung parenchyma
areas of mild scarring or atelectasis, unchanged  6mm focal area of low density identified within the right lobe of the liver inferolaterally remains unchanged. Liver is borderline enlarged  Mild to moderate air and stool retention identified throughout the colon  Small area of diastases involving the superior ventral abdominal wall, unchanged  Small left greater than right fat-containing inguinal canal hernias  Small groin lymph nodes  1.4x1.6cm mixed density and subtle asymmetry localizing to the oblique musculature of the posterior lateral abdominal pelvic muscular wall. This area did demonstrate high glucose affinity on prior PET/CT-scan in 2022. More recent PET/CT-scan demonstrated weakly positive glucose uptake and had a similar appearance as to today's study on corresponding CT-scan. On prior exam this measured approximately 1.4x2cm in size and may have slightly decreased in size or remains persistent  Mild to moderate SI joint arthropathy  Mild pubic symphysitis  Moderate diffuse degenerative spur visualization throughout the spine more pronounced involving the thoracic spine and thoracolumbar junction. There is prominent sclerotic focus at T11 and may represent a sclerotic metastatic deposition. This did not demonstrate high glucose affinity on prior exam and may represent a treated lesion  NM Bone Scan (06/27/23)  COMPARISON: CT-scan 06/27/23 and PET/CT-scan 03/28/23  Areas of degenerative features and arthropathy both the without evidence for clear metastatic deposition  LAB EVALUATION:   level (05/24/23): 35 => 58.2 (H) (07/05/23)   TREATMENT:  2mg Arimidex daily, which has been continued to the present (START DATE: November 2022), under the care of Dr. Nano Vaughn discussion with Dr. Shoshana Wilson on 07/17/23: Anastasiia Drafts next scan for measurable disease to biopsy, perform NGS, PD-L1 and HER2/fredy testing, prior to administration of next therapy (possibly immunotherapy).  In the interim,

## 2023-07-20 NOTE — PATIENT INSTRUCTIONS
Please remind PCP to complete DEXA bone scan and evaluate for treatment of osteopenia. Please remind PCP to administer vaccine(s) for Shingles and Pneumonia. Please remind PCP to screen for diabetes by means of HbA1c.

## 2023-08-07 RX ORDER — OMEPRAZOLE 20 MG/1
CAPSULE, DELAYED RELEASE ORAL
Qty: 180 CAPSULE | OUTPATIENT
Start: 2023-08-07

## 2023-08-09 ENCOUNTER — HOSPITAL ENCOUNTER (OUTPATIENT)
Age: 68
Setting detail: SPECIMEN
Discharge: HOME | End: 2023-08-09

## 2023-08-14 DIAGNOSIS — J01.80 OTHER ACUTE SINUSITIS, RECURRENCE NOT SPECIFIED: ICD-10-CM

## 2023-08-14 DIAGNOSIS — Z12.31 ENCOUNTER FOR SCREENING MAMMOGRAM FOR MALIGNANT NEOPLASM OF BREAST: ICD-10-CM

## 2023-08-14 DIAGNOSIS — D51.3 OTHER DIETARY VITAMIN B12 DEFICIENCY ANEMIA: ICD-10-CM

## 2023-08-14 DIAGNOSIS — K57.00 DIVERTICULITIS OF SMALL INTESTINE WITH PERFORATION AND ABSCESS WITHOUT BLEEDING: ICD-10-CM

## 2023-08-14 DIAGNOSIS — R19.04 LEFT LOWER QUADRANT ABDOMINAL MASS: ICD-10-CM

## 2023-08-14 DIAGNOSIS — R97.8 OTHER ABNORMAL TUMOR MARKERS: ICD-10-CM

## 2023-08-14 DIAGNOSIS — Z51.11 ENCOUNTER FOR ANTINEOPLASTIC CHEMOTHERAPY: ICD-10-CM

## 2023-08-14 DIAGNOSIS — Z79.899 OTHER LONG TERM (CURRENT) DRUG THERAPY: ICD-10-CM

## 2023-08-14 DIAGNOSIS — R11.2 NAUSEA AND VOMITING, UNSPECIFIED VOMITING TYPE: ICD-10-CM

## 2023-08-14 DIAGNOSIS — K90.89 OTHER SPECIFIED INTESTINAL MALABSORPTION: ICD-10-CM

## 2023-08-14 DIAGNOSIS — R10.9 ABDOMINAL PAIN, UNSPECIFIED ABDOMINAL LOCATION: ICD-10-CM

## 2023-08-15 ENCOUNTER — OFFICE VISIT (OUTPATIENT)
Dept: FAMILY MEDICINE CLINIC | Age: 68
End: 2023-08-15
Payer: MEDICARE

## 2023-08-15 VITALS
DIASTOLIC BLOOD PRESSURE: 80 MMHG | WEIGHT: 195 LBS | BODY MASS INDEX: 36.84 KG/M2 | TEMPERATURE: 98 F | OXYGEN SATURATION: 97 % | HEART RATE: 88 BPM | SYSTOLIC BLOOD PRESSURE: 132 MMHG

## 2023-08-15 DIAGNOSIS — Z23 IMMUNIZATION DUE: ICD-10-CM

## 2023-08-15 DIAGNOSIS — E78.00 PURE HYPERCHOLESTEROLEMIA: ICD-10-CM

## 2023-08-15 DIAGNOSIS — I10 PRIMARY HYPERTENSION: Primary | ICD-10-CM

## 2023-08-15 DIAGNOSIS — Z01.818 PRE-OP TESTING: ICD-10-CM

## 2023-08-15 DIAGNOSIS — D84.9 IMMUNOCOMPROMISED (HCC): ICD-10-CM

## 2023-08-15 DIAGNOSIS — R73.9 HYPERGLYCEMIA: ICD-10-CM

## 2023-08-15 DIAGNOSIS — Z78.0 ENCOUNTER FOR OSTEOPOROSIS SCREENING IN ASYMPTOMATIC POSTMENOPAUSAL PATIENT: ICD-10-CM

## 2023-08-15 DIAGNOSIS — C79.9 METASTASIS FROM CANCER OF UTERUS (HCC): ICD-10-CM

## 2023-08-15 DIAGNOSIS — C55 METASTASIS FROM CANCER OF UTERUS (HCC): ICD-10-CM

## 2023-08-15 DIAGNOSIS — G62.0 DRUG-INDUCED POLYNEUROPATHY (HCC): ICD-10-CM

## 2023-08-15 DIAGNOSIS — Z13.820 ENCOUNTER FOR OSTEOPOROSIS SCREENING IN ASYMPTOMATIC POSTMENOPAUSAL PATIENT: ICD-10-CM

## 2023-08-15 PROCEDURE — 3075F SYST BP GE 130 - 139MM HG: CPT

## 2023-08-15 PROCEDURE — 1036F TOBACCO NON-USER: CPT

## 2023-08-15 PROCEDURE — 90677 PCV20 VACCINE IM: CPT

## 2023-08-15 PROCEDURE — 1123F ACP DISCUSS/DSCN MKR DOCD: CPT

## 2023-08-15 PROCEDURE — 3079F DIAST BP 80-89 MM HG: CPT

## 2023-08-15 PROCEDURE — G0009 ADMIN PNEUMOCOCCAL VACCINE: HCPCS

## 2023-08-15 PROCEDURE — G8427 DOCREV CUR MEDS BY ELIG CLIN: HCPCS

## 2023-08-15 PROCEDURE — G8400 PT W/DXA NO RESULTS DOC: HCPCS

## 2023-08-15 PROCEDURE — 3017F COLORECTAL CA SCREEN DOC REV: CPT

## 2023-08-15 PROCEDURE — 99214 OFFICE O/P EST MOD 30 MIN: CPT

## 2023-08-15 PROCEDURE — G8417 CALC BMI ABV UP PARAM F/U: HCPCS

## 2023-08-15 PROCEDURE — 1090F PRES/ABSN URINE INCON ASSESS: CPT

## 2023-08-15 RX ORDER — FAMOTIDINE 20 MG/1
TABLET, FILM COATED ORAL 2 TIMES DAILY
COMMUNITY

## 2023-08-15 RX ORDER — HYDROCODONE BITARTRATE AND ACETAMINOPHEN 5; 325 MG/1; MG/1
TABLET ORAL
COMMUNITY
Start: 2023-08-15

## 2023-08-15 SDOH — ECONOMIC STABILITY: FOOD INSECURITY: WITHIN THE PAST 12 MONTHS, YOU WORRIED THAT YOUR FOOD WOULD RUN OUT BEFORE YOU GOT MONEY TO BUY MORE.: NEVER TRUE

## 2023-08-15 SDOH — ECONOMIC STABILITY: FOOD INSECURITY: WITHIN THE PAST 12 MONTHS, THE FOOD YOU BOUGHT JUST DIDN'T LAST AND YOU DIDN'T HAVE MONEY TO GET MORE.: NEVER TRUE

## 2023-08-15 SDOH — ECONOMIC STABILITY: INCOME INSECURITY: HOW HARD IS IT FOR YOU TO PAY FOR THE VERY BASICS LIKE FOOD, HOUSING, MEDICAL CARE, AND HEATING?: NOT HARD AT ALL

## 2023-08-15 ASSESSMENT — ENCOUNTER SYMPTOMS
CONSTIPATION: 0
VOMITING: 0
COLOR CHANGE: 0
WHEEZING: 0
CHEST TIGHTNESS: 0
EYE DISCHARGE: 0
SORE THROAT: 0
SHORTNESS OF BREATH: 0
DIARRHEA: 0
COUGH: 0
BACK PAIN: 1
ABDOMINAL PAIN: 0
NAUSEA: 0

## 2023-08-15 ASSESSMENT — PATIENT HEALTH QUESTIONNAIRE - PHQ9
1. LITTLE INTEREST OR PLEASURE IN DOING THINGS: 0
SUM OF ALL RESPONSES TO PHQ QUESTIONS 1-9: 1
2. FEELING DOWN, DEPRESSED OR HOPELESS: 1
SUM OF ALL RESPONSES TO PHQ9 QUESTIONS 1 & 2: 1

## 2023-08-15 NOTE — PROGRESS NOTES
Uziel Herman (:  1955) is a 77 y.o. female,Established patient, here for evaluation of the following chief complaint(s):  Follow-up Chronic Condition          Subjective   SUBJECTIVE/OBJECTIVE:  Pt here today for 6 month f/u  VSS    Since last OV pt received the difficult news that she has new metastasis noted on PET scan  She has plans for upcoming biopsies and is being considered for pt assistance for new oral chemo to treat  If pt is unable to get pt assistance her choice is to stop tx and seek comfort care  Pt is established w/ palliative care, they have taken over pain management and are working w/ pt on completing living will and changing code status    Despite current difficulties she remains in a great mood, she is motivated to have good quality of life. In need of additional testing to be completed in collaboration w/ oncology and gyn onc  DEXA, A1C, EKG ordered per their request    Will update pneumonia vaccine in office today and pt to get shingrix at pharmacy      Review of Systems   Constitutional:  Negative for activity change, appetite change, chills, fatigue, fever and unexpected weight change. HENT:  Negative for congestion, ear pain and sore throat. Eyes:  Negative for discharge and visual disturbance. Respiratory:  Negative for cough, chest tightness, shortness of breath and wheezing. Cardiovascular:  Negative for chest pain, palpitations and leg swelling. Gastrointestinal:  Negative for abdominal pain, constipation, diarrhea, nausea and vomiting. Genitourinary:  Negative for dysuria and pelvic pain. Musculoskeletal:  Positive for arthralgias, back pain and myalgias. Negative for gait problem and neck pain. Skin:  Negative for color change, rash and wound. Neurological:  Negative for dizziness, seizures, syncope, weakness, light-headedness, numbness and headaches. Psychiatric/Behavioral:  Positive for sleep disturbance.  Negative for behavioral problems and

## 2023-08-21 ENCOUNTER — HOSPITAL ENCOUNTER (OUTPATIENT)
Age: 68
Discharge: HOME | End: 2023-08-21
Payer: MEDICARE

## 2023-08-21 DIAGNOSIS — Z01.818 PRE-OP TESTING: ICD-10-CM

## 2023-08-21 LAB
AVERAGE GLUCOSE: 100
CHOLESTEROL, FASTING: 196
CHOLESTEROL/HDL RATIO: 3.4
CHOLESTEROL: 196 MG/DL
EKG ATRIAL RATE: 114 BPM
EKG P AXIS: 61 DEGREES
EKG P-R INTERVAL: 204 MS
EKG Q-T INTERVAL: 322 MS
EKG QRS DURATION: 98 MS
EKG QTC CALCULATION (BAZETT): 443 MS
EKG R AXIS: 55 DEGREES
EKG T AXIS: 27 DEGREES
EKG VENTRICULAR RATE: 114 BPM
EST. AVERAGE GLUCOSE BLD GHB EST-MCNC: 100 MG/DL
FREE T4 REFLEX: NO
HBA1C MFR BLD: 5.1 %
HBA1C MFR BLD: 5.1 %
HDLC SERPL-MCNC: 58 MG/DL
HDLC SERPL-MCNC: 58 MG/DL (ref 35–70)
LDL CHOLESTEROL CALCULATED: 109 MG/DL
LDL CHOLESTEROL CALCULATED: 109 MG/DL (ref 0–160)
TRIGL SERPL-MCNC: 147 MG/DL
TRIGLYCERIDE, FASTING: 147
TSH SERPL DL<=0.05 MIU/L-ACNC: 0.58 MIU/L
TSH SERPL DL<=0.05 MIU/L-ACNC: 0.58 UIU/ML
VLDLC SERPL CALC-MCNC: 29 MG/DL

## 2023-08-21 PROCEDURE — 93005 ELECTROCARDIOGRAM TRACING: CPT

## 2023-08-22 ENCOUNTER — TELEPHONE (OUTPATIENT)
Dept: FAMILY MEDICINE CLINIC | Age: 68
End: 2023-08-22

## 2023-08-22 DIAGNOSIS — R94.31 ABNORMAL EKG: Primary | ICD-10-CM

## 2023-08-22 NOTE — TELEPHONE ENCOUNTER
No that's not correct, oncology should be obtaining all baselines prior to initiating chemo. I'll place a cardiology referral to help with this.

## 2023-08-22 NOTE — TELEPHONE ENCOUNTER
Patient advised. She will call oncology and have order sent to [facility]. When she has that scheduled she will let me know and we will fax cardiology referral. Gave patient EKG and Lab results. Gave her referral info and central scheduling phone number.

## 2023-08-22 NOTE — TELEPHONE ENCOUNTER
Patient is starting chemo. She is stating that oncology is telling her that they want us to order an echo so they have a baseline of her heart before she starts new medication. I told her that Onco should be ordering this since they are giving her the chemo and she stated that onco told her we needed to order it. Patient does not have cardiologist. Please advise.

## 2023-08-25 ENCOUNTER — HOSPITAL ENCOUNTER (OUTPATIENT)
Age: 68
End: 2023-08-25
Payer: MEDICARE

## 2023-08-25 DIAGNOSIS — Z51.11 ENCOUNTER FOR ANTINEOPLASTIC CHEMOTHERAPY: ICD-10-CM

## 2023-08-25 DIAGNOSIS — Z79.899 LONG TERM USE OF DRUG: ICD-10-CM

## 2023-08-25 DIAGNOSIS — R11.2 NAUSEA AND VOMITING, UNSPECIFIED VOMITING TYPE: ICD-10-CM

## 2023-08-25 DIAGNOSIS — R97.8 OTHER ABNORMAL TUMOR MARKERS: ICD-10-CM

## 2023-08-25 DIAGNOSIS — C54.1 MALIGNANT NEOPLASM OF ENDOMETRIUM (HCC): ICD-10-CM

## 2023-08-25 DIAGNOSIS — C78.6 SECONDARY MALIGNANT NEOPLASM OF RETROPERITONEUM AND PERITONEUM (HCC): ICD-10-CM

## 2023-08-25 DIAGNOSIS — D51.3 OTHER DIETARY VITAMIN B12 DEFICIENCY ANEMIA: ICD-10-CM

## 2023-08-25 DIAGNOSIS — K90.9 INTESTINAL MALABSORPTION, UNSPECIFIED TYPE: ICD-10-CM

## 2023-08-25 LAB
ECHO AO ROOT DIAM: 2.5 CM
ECHO AV AREA PEAK VELOCITY: 2.2 CM2
ECHO AV AREA VTI: 2.2 CM2
ECHO AV MEAN GRADIENT: 11 MMHG
ECHO AV MEAN VELOCITY: 1.6 M/S
ECHO AV PEAK GRADIENT: 20 MMHG
ECHO AV PEAK VELOCITY: 2.2 M/S
ECHO AV VELOCITY RATIO: 0.68
ECHO AV VTI: 50 CM
ECHO EST RA PRESSURE: 3 MMHG
ECHO LA AREA 4C: 19 CM2
ECHO LA DIAMETER: 3.6 CM
ECHO LA MAJOR AXIS: 5.6 CM
ECHO LA TO AORTIC ROOT RATIO: 1.44
ECHO LA VOL 4C: 52 ML (ref 22–52)
ECHO LV E' LATERAL VELOCITY: 6 CM/S
ECHO LV E' SEPTAL VELOCITY: 5 CM/S
ECHO LV EDV A2C: 58 ML
ECHO LV EDV A4C: 77 ML
ECHO LV EJECTION FRACTION A2C: 63 %
ECHO LV EJECTION FRACTION A4C: 57 %
ECHO LV EJECTION FRACTION BIPLANE: 60 % (ref 55–100)
ECHO LV ESV A2C: 21 ML
ECHO LV ESV A4C: 33 ML
ECHO LV FRACTIONAL SHORTENING: 50 % (ref 28–44)
ECHO LV GLOBAL LONGITUDINAL STRAIN (GLS): -15.2 %
ECHO LV INTERNAL DIMENSION DIASTOLIC: 3.8 CM (ref 3.9–5.3)
ECHO LV INTERNAL DIMENSION SYSTOLIC: 1.9 CM
ECHO LV IVSD: 1.6 CM (ref 0.6–0.9)
ECHO LV MASS 2D: 194.1 G (ref 67–162)
ECHO LV POSTERIOR WALL DIASTOLIC: 1.2 CM (ref 0.6–0.9)
ECHO LV RELATIVE WALL THICKNESS RATIO: 0.63
ECHO LVOT AREA: 3.1 CM2
ECHO LVOT AV VTI INDEX: 0.7
ECHO LVOT DIAM: 2 CM
ECHO LVOT MEAN GRADIENT: 5 MMHG
ECHO LVOT PEAK GRADIENT: 9 MMHG
ECHO LVOT PEAK VELOCITY: 1.5 M/S
ECHO LVOT SV: 110.2 ML
ECHO LVOT VTI: 35.1 CM
ECHO MV A VELOCITY: 1.26 M/S
ECHO MV E DECELERATION TIME (DT): 187 MS
ECHO MV E VELOCITY: 0.98 M/S
ECHO MV E/A RATIO: 0.78
ECHO MV E/E' LATERAL: 16.33
ECHO MV E/E' RATIO (AVERAGED): 17.97
ECHO MV E/E' SEPTAL: 19.6
ECHO RIGHT VENTRICULAR SYSTOLIC PRESSURE (RVSP): 32 MMHG
ECHO TV REGURGITANT MAX VELOCITY: 2.71 M/S
ECHO TV REGURGITANT PEAK GRADIENT: 29 MMHG

## 2023-08-25 PROCEDURE — 93306 TTE W/DOPPLER COMPLETE: CPT

## 2023-08-29 DIAGNOSIS — R73.9 HYPERGLYCEMIA: ICD-10-CM

## 2023-08-29 DIAGNOSIS — E78.00 PURE HYPERCHOLESTEROLEMIA: ICD-10-CM

## 2023-09-01 ENCOUNTER — OFFICE VISIT (OUTPATIENT)
Dept: FAMILY MEDICINE CLINIC | Age: 68
End: 2023-09-01

## 2023-09-01 VITALS
OXYGEN SATURATION: 97 % | TEMPERATURE: 97 F | DIASTOLIC BLOOD PRESSURE: 100 MMHG | HEART RATE: 76 BPM | WEIGHT: 188.6 LBS | SYSTOLIC BLOOD PRESSURE: 166 MMHG | BODY MASS INDEX: 35.64 KG/M2

## 2023-09-01 DIAGNOSIS — C79.9 METASTASIS FROM CANCER OF UTERUS (HCC): ICD-10-CM

## 2023-09-01 DIAGNOSIS — I10 PRIMARY HYPERTENSION: Primary | ICD-10-CM

## 2023-09-01 DIAGNOSIS — C55 METASTASIS FROM CANCER OF UTERUS (HCC): ICD-10-CM

## 2023-09-01 RX ORDER — LOSARTAN POTASSIUM 50 MG/1
50 TABLET ORAL DAILY
Qty: 30 TABLET | Refills: 0 | Status: SHIPPED | OUTPATIENT
Start: 2023-09-01

## 2023-09-01 ASSESSMENT — ENCOUNTER SYMPTOMS
CHEST TIGHTNESS: 0
CONSTIPATION: 0
COLOR CHANGE: 0
SORE THROAT: 0
WHEEZING: 0
ABDOMINAL PAIN: 0
DIARRHEA: 0
EYE DISCHARGE: 0
NAUSEA: 0
VOMITING: 0
COUGH: 0
SHORTNESS OF BREATH: 0

## 2023-09-01 ASSESSMENT — PATIENT HEALTH QUESTIONNAIRE - PHQ9
SUM OF ALL RESPONSES TO PHQ QUESTIONS 1-9: 0
2. FEELING DOWN, DEPRESSED OR HOPELESS: 0
1. LITTLE INTEREST OR PLEASURE IN DOING THINGS: 0
SUM OF ALL RESPONSES TO PHQ9 QUESTIONS 1 & 2: 0
SUM OF ALL RESPONSES TO PHQ QUESTIONS 1-9: 0

## 2023-09-01 NOTE — PROGRESS NOTES
diaphoretic. HENT:      Head: Normocephalic and atraumatic. Right Ear: External ear normal.      Left Ear: External ear normal.      Nose: Nose normal.   Eyes:      General: No scleral icterus. Right eye: No discharge. Left eye: No discharge. Conjunctiva/sclera: Conjunctivae normal.      Pupils: Pupils are equal, round, and reactive to light. Neck:      Trachea: No tracheal deviation. Cardiovascular:      Rate and Rhythm: Normal rate and regular rhythm. Heart sounds: Normal heart sounds. No murmur heard. No friction rub. No gallop. Pulmonary:      Effort: Pulmonary effort is normal. No tachypnea, accessory muscle usage or respiratory distress. Breath sounds: Normal breath sounds. No stridor. No decreased breath sounds, wheezing, rhonchi or rales. Abdominal:      Palpations: Abdomen is soft. Musculoskeletal:         General: No tenderness or deformity. Normal range of motion. Cervical back: Normal range of motion and neck supple. Right lower leg: No edema. Left lower leg: No edema. Skin:     General: Skin is warm and dry. Coloration: Skin is not pale. Findings: No erythema or rash. Neurological:      Mental Status: She is alert and oriented to person, place, and time. GCS: GCS eye subscore is 4. GCS verbal subscore is 5. GCS motor subscore is 6. Gait: Gait is intact. Gait normal.   Psychiatric:         Speech: Speech normal.         Behavior: Behavior normal.         Thought Content: Thought content normal.         Judgment: Judgment normal.               ASSESSMENT/PLAN:  1. Primary hypertension  -uncontrolled, start ARB  -pt to monitor daily and return in 1 week for recheck    -     losartan (COZAAR) 50 MG tablet; Take 1 tablet by mouth daily, Disp-30 tablet, R-0Normal  -     Basic Metabolic Panel; Future    2.  Metastasis from cancer of uterus ([address])  -pt holding oral chemo at this time    Return in about 1 week (around

## 2023-09-07 ENCOUNTER — HOSPITAL ENCOUNTER (EMERGENCY)
Age: 68
Discharge: HOME | End: 2023-09-07
Attending: EMERGENCY MEDICINE
Payer: MEDICARE

## 2023-09-07 ENCOUNTER — TELEPHONE (OUTPATIENT)
Dept: FAMILY MEDICINE CLINIC | Age: 68
End: 2023-09-07

## 2023-09-07 VITALS
DIASTOLIC BLOOD PRESSURE: 76 MMHG | BODY MASS INDEX: 35.52 KG/M2 | TEMPERATURE: 97.9 F | OXYGEN SATURATION: 97 % | WEIGHT: 188 LBS | HEART RATE: 86 BPM | SYSTOLIC BLOOD PRESSURE: 157 MMHG | RESPIRATION RATE: 16 BRPM

## 2023-09-07 DIAGNOSIS — T50.905A MEDICATION REACTION, INITIAL ENCOUNTER: ICD-10-CM

## 2023-09-07 DIAGNOSIS — I15.8 OTHER SECONDARY HYPERTENSION: Primary | ICD-10-CM

## 2023-09-07 LAB
ALBUMIN SERPL-MCNC: 3.9 G/DL (ref 3.5–5.2)
ALP SERPL-CCNC: 47 U/L (ref 35–104)
ALT SERPL-CCNC: 9 U/L (ref 5–33)
ANION GAP SERPL CALCULATED.3IONS-SCNC: 9 MMOL/L (ref 9–17)
AST SERPL-CCNC: 13 U/L
BASOPHILS # BLD: <0.03 K/UL (ref 0–0.2)
BASOPHILS NFR BLD: 0 % (ref 0–2)
BILIRUB SERPL-MCNC: 0.1 MG/DL (ref 0.3–1.2)
BUN SERPL-MCNC: 27 MG/DL (ref 8–23)
BUN/CREAT SERPL: 30 (ref 9–20)
CALCIUM SERPL-MCNC: 9.8 MG/DL (ref 8.6–10.4)
CHLORIDE SERPL-SCNC: 103 MMOL/L (ref 98–107)
CO2 SERPL-SCNC: 29 MMOL/L (ref 20–31)
CREAT SERPL-MCNC: 0.9 MG/DL (ref 0.5–0.9)
EOSINOPHIL # BLD: 0.07 K/UL (ref 0–0.44)
EOSINOPHILS RELATIVE PERCENT: 1 % (ref 1–4)
ERYTHROCYTE [DISTWIDTH] IN BLOOD BY AUTOMATED COUNT: 12.3 % (ref 11.8–14.4)
GFR SERPL CREATININE-BSD FRML MDRD: >60 ML/MIN/1.73M2
GLUCOSE SERPL-MCNC: 90 MG/DL (ref 70–99)
HCT VFR BLD AUTO: 40.7 % (ref 36.3–47.1)
HGB BLD-MCNC: 12.4 G/DL (ref 11.9–15.1)
IMM GRANULOCYTES # BLD AUTO: 0.05 K/UL (ref 0–0.3)
IMM GRANULOCYTES NFR BLD: 1 %
LYMPHOCYTES NFR BLD: 1.5 K/UL (ref 1.1–3.7)
LYMPHOCYTES RELATIVE PERCENT: 28 % (ref 24–43)
MCH RBC QN AUTO: 30.4 PG (ref 25.2–33.5)
MCHC RBC AUTO-ENTMCNC: 30.5 G/DL (ref 28.4–34.8)
MCV RBC AUTO: 99.8 FL (ref 82.6–102.9)
MONOCYTES NFR BLD: 0.6 K/UL (ref 0.1–1.2)
MONOCYTES NFR BLD: 11 % (ref 3–12)
NEUTROPHILS NFR BLD: 59 % (ref 36–65)
NEUTS SEG NFR BLD: 3.11 K/UL (ref 1.5–8.1)
NRBC BLD-RTO: 0 PER 100 WBC
PLATELET # BLD AUTO: 289 K/UL (ref 138–453)
PMV BLD AUTO: 10.5 FL (ref 8.1–13.5)
POTASSIUM SERPL-SCNC: 4.4 MMOL/L (ref 3.7–5.3)
PROT SERPL-MCNC: 7.1 G/DL (ref 6.4–8.3)
RBC # BLD AUTO: 4.08 M/UL (ref 3.95–5.11)
SODIUM SERPL-SCNC: 141 MMOL/L (ref 135–144)
TROPONIN I SERPL HS-MCNC: <6 NG/L (ref 0–14)
WBC OTHER # BLD: 5.4 K/UL (ref 3.5–11.3)

## 2023-09-07 PROCEDURE — 6370000000 HC RX 637 (ALT 250 FOR IP): Performed by: EMERGENCY MEDICINE

## 2023-09-07 PROCEDURE — 93005 ELECTROCARDIOGRAM TRACING: CPT | Performed by: EMERGENCY MEDICINE

## 2023-09-07 PROCEDURE — 84484 ASSAY OF TROPONIN QUANT: CPT

## 2023-09-07 PROCEDURE — 6360000002 HC RX W HCPCS: Performed by: EMERGENCY MEDICINE

## 2023-09-07 PROCEDURE — 99284 EMERGENCY DEPT VISIT MOD MDM: CPT

## 2023-09-07 PROCEDURE — 80053 COMPREHEN METABOLIC PANEL: CPT

## 2023-09-07 PROCEDURE — 85025 COMPLETE CBC W/AUTO DIFF WBC: CPT

## 2023-09-07 RX ORDER — LABETALOL HYDROCHLORIDE 5 MG/ML
10 INJECTION, SOLUTION INTRAVENOUS ONCE
Status: COMPLETED | OUTPATIENT
Start: 2023-09-07 | End: 2023-09-07

## 2023-09-07 RX ORDER — IBUPROFEN 800 MG/1
800 TABLET ORAL ONCE
Status: COMPLETED | OUTPATIENT
Start: 2023-09-07 | End: 2023-09-07

## 2023-09-07 RX ORDER — GABAPENTIN 400 MG/1
400 CAPSULE ORAL ONCE
Status: COMPLETED | OUTPATIENT
Start: 2023-09-07 | End: 2023-09-07

## 2023-09-07 RX ORDER — ACETAMINOPHEN 500 MG
1000 TABLET ORAL ONCE
Status: COMPLETED | OUTPATIENT
Start: 2023-09-07 | End: 2023-09-07

## 2023-09-07 RX ADMIN — GABAPENTIN 400 MG: 400 CAPSULE ORAL at 20:50

## 2023-09-07 RX ADMIN — LABETALOL HYDROCHLORIDE 10 MG: 5 INJECTION, SOLUTION INTRAVENOUS at 20:50

## 2023-09-07 RX ADMIN — IBUPROFEN 800 MG: 800 TABLET ORAL at 22:59

## 2023-09-07 RX ADMIN — ACETAMINOPHEN 1000 MG: 500 TABLET ORAL at 20:49

## 2023-09-07 ASSESSMENT — PAIN DESCRIPTION - LOCATION: LOCATION: HEAD

## 2023-09-07 ASSESSMENT — PAIN - FUNCTIONAL ASSESSMENT: PAIN_FUNCTIONAL_ASSESSMENT: 0-10

## 2023-09-07 ASSESSMENT — PAIN DESCRIPTION - DESCRIPTORS: DESCRIPTORS: ACHING

## 2023-09-07 ASSESSMENT — PAIN SCALES - GENERAL: PAINLEVEL_OUTOF10: 3

## 2023-09-07 NOTE — TELEPHONE ENCOUNTER
How far apart in between taking her meds and rechecking the BP? I want her to take 2 Losartan daily until we see each other so it will be 100 mg total daily.

## 2023-09-07 NOTE — TELEPHONE ENCOUNTER
Patient took meds at about 6:45 am and rechecked BP at 8am. I did advised patient to take her Losartan 2 daily. She did ask how she should take them.  I advised to take 2 in the morning or 1 in the morning and 1 at night

## 2023-09-07 NOTE — TELEPHONE ENCOUNTER
Patient calling stating that her BP was 201/116. She had a slight headache. Patient took Losartan gabapentin and tylenol. Headache is gone and Bp is 191/100.  No CP, No SOB,

## 2023-09-08 ENCOUNTER — OFFICE VISIT (OUTPATIENT)
Dept: FAMILY MEDICINE CLINIC | Age: 68
End: 2023-09-08

## 2023-09-08 VITALS
SYSTOLIC BLOOD PRESSURE: 150 MMHG | HEIGHT: 61 IN | HEART RATE: 85 BPM | BODY MASS INDEX: 35.61 KG/M2 | WEIGHT: 188.6 LBS | OXYGEN SATURATION: 96 % | DIASTOLIC BLOOD PRESSURE: 92 MMHG

## 2023-09-08 DIAGNOSIS — I10 PRIMARY HYPERTENSION: ICD-10-CM

## 2023-09-08 DIAGNOSIS — Z09 HOSPITAL DISCHARGE FOLLOW-UP: Primary | ICD-10-CM

## 2023-09-08 LAB
EKG ATRIAL RATE: 65 BPM
EKG P AXIS: 28 DEGREES
EKG P-R INTERVAL: 172 MS
EKG Q-T INTERVAL: 448 MS
EKG QRS DURATION: 90 MS
EKG QTC CALCULATION (BAZETT): 465 MS
EKG R AXIS: -10 DEGREES
EKG T AXIS: 13 DEGREES
EKG VENTRICULAR RATE: 65 BPM

## 2023-09-08 PROCEDURE — 93010 ELECTROCARDIOGRAM REPORT: CPT | Performed by: INTERNAL MEDICINE

## 2023-09-08 RX ORDER — OMEPRAZOLE 20 MG/1
20 CAPSULE, DELAYED RELEASE ORAL
COMMUNITY

## 2023-09-08 RX ORDER — SENNOSIDES A AND B 8.6 MG/1
1 TABLET, FILM COATED ORAL PRN
COMMUNITY
Start: 2023-08-20

## 2023-09-08 RX ORDER — PROCHLORPERAZINE MALEATE 10 MG
TABLET ORAL
COMMUNITY
Start: 2023-08-17

## 2023-09-08 RX ORDER — GABAPENTIN 400 MG/1
CAPSULE ORAL
COMMUNITY
Start: 2023-08-25

## 2023-09-08 ASSESSMENT — ENCOUNTER SYMPTOMS
COUGH: 0
EYE DISCHARGE: 0
CHEST TIGHTNESS: 0
SORE THROAT: 0
VOMITING: 0
CONSTIPATION: 0
COLOR CHANGE: 0
ABDOMINAL PAIN: 0
SHORTNESS OF BREATH: 0
WHEEZING: 0
SHORTNESS OF BREATH: 0
DIARRHEA: 0
NAUSEA: 0
ABDOMINAL PAIN: 0

## 2023-09-08 NOTE — ED PROVIDER NOTES
[facility] ED  Emergency Department Encounter  Emergency Medicine Physician Note     Pt Name:Keaton Tena  MRN: [id number]  [address] 1955  Date of evaluation: 9/7/23  PCP:  GARTH Michelle CNP  Note Started: 8:08 PM EDT      CHIEF COMPLAINT       Chief Complaint   Patient presents with    Hypertension     States that d/t cancer pill it's causing HTN        HISTORY OF PRESENT ILLNESS  (Location/Symptom, Timing/Onset, Context/Setting, Quality, Duration, Modifying Factors, Severity.)      Barbara Parkinson is a 77 y.o. female who presents with high blood pressure. Patient noted that she is on a chemotherapy agent that increases blood pressure. Patient has concerns as her blood pressure was increasing up into the 200s. Patient describes mild headache. No chest pain no shortness of breath no abdominal pain, change in urinations or bowel habits. Patient was recently started on losartan for elevated blood pressure, was instructed by PCP to double this dose due to having elevated blood pressure. PAST MEDICAL / SURGICAL / SOCIAL / FAMILY HISTORY      has a past medical history of Endometrial cancer ([address]), Hyperlipidemia, Hypertension, Obese, and Umbilical hernia. No additional pertinent        has a past surgical history that includes Colonoscopy (2011); Upper gastrointestinal endoscopy (2011); Tonsillectomy; Endometrial biopsy (09/12/146); Umbilical hernia repair (2010); Total abdominal hysterectomy w/ bilateral salpingoophorectomy (10/19/2016); Appendectomy (10/19/2016); Abdominal exploration surgery (10/19/2016); Colonoscopy (N/A, 04/09/2021); and Cystoscopy (N/A, 06/18/2021). No additional pertinent       Social History     Socioeconomic History    Marital status:       Spouse name: Not on file    Number of children: 2    Years of education: Not on file    Highest education level: Not on file   Occupational History    Not on file   Tobacco Use    Smoking status: Former     Packs/day: 1.00

## 2023-09-08 NOTE — PROGRESS NOTES
BP at home and return in 3 days for f/u to assess if additional med may be needed for when oral chemo is restarted    Return if symptoms worsen or fail to improve, for keep appt monday. An electronic signature was used to authenticate this note.     --GARTH Singh - CNP

## 2023-09-11 ENCOUNTER — OFFICE VISIT (OUTPATIENT)
Dept: FAMILY MEDICINE CLINIC | Age: 68
End: 2023-09-11
Payer: MEDICARE

## 2023-09-11 VITALS
BODY MASS INDEX: 36.21 KG/M2 | OXYGEN SATURATION: 98 % | DIASTOLIC BLOOD PRESSURE: 76 MMHG | WEIGHT: 191.8 LBS | HEART RATE: 96 BPM | SYSTOLIC BLOOD PRESSURE: 134 MMHG | HEIGHT: 61 IN

## 2023-09-11 DIAGNOSIS — I10 PRIMARY HYPERTENSION: Primary | ICD-10-CM

## 2023-09-11 DIAGNOSIS — C55 METASTASIS FROM CANCER OF UTERUS (HCC): ICD-10-CM

## 2023-09-11 DIAGNOSIS — C79.9 METASTASIS FROM CANCER OF UTERUS (HCC): ICD-10-CM

## 2023-09-11 PROCEDURE — 3078F DIAST BP <80 MM HG: CPT

## 2023-09-11 PROCEDURE — 3017F COLORECTAL CA SCREEN DOC REV: CPT

## 2023-09-11 PROCEDURE — G8400 PT W/DXA NO RESULTS DOC: HCPCS

## 2023-09-11 PROCEDURE — G8427 DOCREV CUR MEDS BY ELIG CLIN: HCPCS

## 2023-09-11 PROCEDURE — 99214 OFFICE O/P EST MOD 30 MIN: CPT

## 2023-09-11 PROCEDURE — 1123F ACP DISCUSS/DSCN MKR DOCD: CPT

## 2023-09-11 PROCEDURE — 3074F SYST BP LT 130 MM HG: CPT

## 2023-09-11 PROCEDURE — G8417 CALC BMI ABV UP PARAM F/U: HCPCS

## 2023-09-11 PROCEDURE — 1090F PRES/ABSN URINE INCON ASSESS: CPT

## 2023-09-11 PROCEDURE — 1036F TOBACCO NON-USER: CPT

## 2023-09-11 NOTE — PROGRESS NOTES
Grabiel Heck (:  1955) is a 77 y.o. female,Established patient, here for evaluation of the following chief complaint(s):  Blood Pressure Check          Subjective   SUBJECTIVE/OBJECTIVE:  Pt here today for BP check  VSS    Pt has been off of oral Levima for 1 week now, BP has trended back to normal readings  She is only taking AM dose of 50 mg Losartan after she had an episode of lower BP w/ sx of dizziness after taking it BID  Plan is for pt to possibly restart the oral chemo regimen this week when she f/u w/ oncology    Standing plan right now is for pt to stay on AM dose of 50 mg for now  If her BP trends above 294 systolic I recommend she take a PM dose of an additional 50 mg of Losartan  If her oral chemo is restarted and her BP trends up again I will have her take the full 100 mg in the AM and consider possible addition of CCB        Review of Systems   Constitutional:  Negative for activity change, appetite change, chills, fatigue, fever and unexpected weight change. HENT:  Negative for congestion, ear pain and sore throat. Eyes:  Negative for discharge and visual disturbance. Respiratory:  Negative for cough, chest tightness, shortness of breath and wheezing. Cardiovascular:  Negative for chest pain, palpitations and leg swelling. Gastrointestinal:  Negative for abdominal pain, constipation, diarrhea, nausea and vomiting. Genitourinary:  Negative for dysuria and pelvic pain. Musculoskeletal:  Negative for gait problem and neck pain. Skin:  Negative for color change, rash and wound. Neurological:  Negative for dizziness, seizures, syncope, weakness, light-headedness, numbness and headaches. Psychiatric/Behavioral:  Negative for behavioral problems and confusion. Objective   Physical Exam  Vitals and nursing note reviewed. Constitutional:       General: She is not in acute distress. Appearance: Normal appearance. She is well-developed. She is obese.  She is not

## 2023-09-12 ASSESSMENT — ENCOUNTER SYMPTOMS
DIARRHEA: 0
COUGH: 0
EYE DISCHARGE: 0
NAUSEA: 0
SHORTNESS OF BREATH: 0
COLOR CHANGE: 0
WHEEZING: 0
CHEST TIGHTNESS: 0
CONSTIPATION: 0
SORE THROAT: 0
ABDOMINAL PAIN: 0
VOMITING: 0

## 2023-09-16 ENCOUNTER — HOSPITAL ENCOUNTER (OUTPATIENT)
Dept: MAMMOGRAPHY | Age: 68
End: 2023-09-16
Payer: MEDICARE

## 2023-09-16 DIAGNOSIS — Z13.820 ENCOUNTER FOR OSTEOPOROSIS SCREENING IN ASYMPTOMATIC POSTMENOPAUSAL PATIENT: ICD-10-CM

## 2023-09-16 DIAGNOSIS — Z78.0 ENCOUNTER FOR OSTEOPOROSIS SCREENING IN ASYMPTOMATIC POSTMENOPAUSAL PATIENT: ICD-10-CM

## 2023-09-16 PROCEDURE — 77080 DXA BONE DENSITY AXIAL: CPT

## 2023-09-18 ENCOUNTER — TELEPHONE (OUTPATIENT)
Dept: GYNECOLOGIC ONCOLOGY | Age: 68
End: 2023-09-18

## 2023-09-20 ENCOUNTER — OFFICE VISIT (OUTPATIENT)
Dept: GYNECOLOGIC ONCOLOGY | Age: 68
End: 2023-09-20

## 2023-09-20 VITALS
HEIGHT: 61 IN | HEART RATE: 81 BPM | BODY MASS INDEX: 35.87 KG/M2 | SYSTOLIC BLOOD PRESSURE: 173 MMHG | OXYGEN SATURATION: 96 % | DIASTOLIC BLOOD PRESSURE: 98 MMHG | WEIGHT: 190 LBS | TEMPERATURE: 97 F

## 2023-09-20 DIAGNOSIS — K21.9 GERD WITHOUT ESOPHAGITIS: ICD-10-CM

## 2023-09-20 DIAGNOSIS — C55 METASTASIS FROM CANCER OF UTERUS (HCC): Primary | ICD-10-CM

## 2023-09-20 DIAGNOSIS — G89.3 CANCER RELATED PAIN: ICD-10-CM

## 2023-09-20 DIAGNOSIS — E66.01 SEVERE OBESITY (BMI 35.0-39.9) WITH COMORBIDITY (HCC): ICD-10-CM

## 2023-09-20 DIAGNOSIS — C79.9 METASTASIS FROM CANCER OF UTERUS (HCC): Primary | ICD-10-CM

## 2023-09-20 DIAGNOSIS — M85.859 OSTEOPENIA OF HIP, UNSPECIFIED LATERALITY: ICD-10-CM

## 2023-09-20 DIAGNOSIS — K59.00 CONSTIPATION, UNSPECIFIED CONSTIPATION TYPE: ICD-10-CM

## 2023-09-20 DIAGNOSIS — I10 PRIMARY HYPERTENSION: ICD-10-CM

## 2023-09-20 DIAGNOSIS — R10.9 LEFT SIDED ABDOMINAL PAIN: ICD-10-CM

## 2023-09-20 RX ORDER — LOSARTAN POTASSIUM 50 MG/1
50 TABLET ORAL 2 TIMES DAILY
Qty: 60 TABLET | Refills: 1 | Status: SHIPPED | OUTPATIENT
Start: 2023-09-20

## 2023-09-20 ASSESSMENT — ENCOUNTER SYMPTOMS
NAUSEA: 1
RESPIRATORY NEGATIVE: 1
BACK PAIN: 1

## 2023-09-20 NOTE — TELEPHONE ENCOUNTER
Patient states she is taking 1 tab BID.          Hunter Fonseca is calling to request a refill on the following medication(s):    Medication Request:  Requested Prescriptions     Pending Prescriptions Disp Refills    losartan (COZAAR) 50 MG tablet 30 tablet 0     Sig: Take 1 tablet by mouth daily       Last Visit Date (If Applicable):  2/43/0060    Next Visit Date:    2/19/2024

## 2023-09-27 ENCOUNTER — OFFICE VISIT (OUTPATIENT)
Dept: FAMILY MEDICINE CLINIC | Age: 68
End: 2023-09-27
Payer: MEDICARE

## 2023-09-27 VITALS
SYSTOLIC BLOOD PRESSURE: 140 MMHG | OXYGEN SATURATION: 97 % | WEIGHT: 187 LBS | DIASTOLIC BLOOD PRESSURE: 92 MMHG | TEMPERATURE: 97.2 F | HEART RATE: 95 BPM | BODY MASS INDEX: 35.33 KG/M2

## 2023-09-27 DIAGNOSIS — E78.00 PURE HYPERCHOLESTEROLEMIA: ICD-10-CM

## 2023-09-27 DIAGNOSIS — I10 PRIMARY HYPERTENSION: ICD-10-CM

## 2023-09-27 DIAGNOSIS — I10 PRIMARY HYPERTENSION: Primary | ICD-10-CM

## 2023-09-27 PROCEDURE — 1123F ACP DISCUSS/DSCN MKR DOCD: CPT

## 2023-09-27 PROCEDURE — G8427 DOCREV CUR MEDS BY ELIG CLIN: HCPCS

## 2023-09-27 PROCEDURE — 1090F PRES/ABSN URINE INCON ASSESS: CPT

## 2023-09-27 PROCEDURE — 1036F TOBACCO NON-USER: CPT

## 2023-09-27 PROCEDURE — 3074F SYST BP LT 130 MM HG: CPT

## 2023-09-27 PROCEDURE — 99214 OFFICE O/P EST MOD 30 MIN: CPT

## 2023-09-27 PROCEDURE — 3078F DIAST BP <80 MM HG: CPT

## 2023-09-27 PROCEDURE — 3017F COLORECTAL CA SCREEN DOC REV: CPT

## 2023-09-27 PROCEDURE — G8399 PT W/DXA RESULTS DOCUMENT: HCPCS

## 2023-09-27 PROCEDURE — G8417 CALC BMI ABV UP PARAM F/U: HCPCS

## 2023-09-27 RX ORDER — ATORVASTATIN CALCIUM 10 MG/1
10 TABLET, FILM COATED ORAL DAILY
Qty: 90 TABLET | Refills: 1 | Status: SHIPPED | OUTPATIENT
Start: 2023-09-27

## 2023-09-27 RX ORDER — LOSARTAN POTASSIUM 50 MG/1
50 TABLET ORAL DAILY
Qty: 30 TABLET | OUTPATIENT
Start: 2023-09-27

## 2023-09-27 RX ORDER — AMLODIPINE BESYLATE 5 MG/1
5 TABLET ORAL DAILY
Qty: 30 TABLET | Refills: 0 | Status: SHIPPED | OUTPATIENT
Start: 2023-09-27

## 2023-09-27 SDOH — ECONOMIC STABILITY: FOOD INSECURITY: WITHIN THE PAST 12 MONTHS, YOU WORRIED THAT YOUR FOOD WOULD RUN OUT BEFORE YOU GOT MONEY TO BUY MORE.: NEVER TRUE

## 2023-09-27 SDOH — ECONOMIC STABILITY: INCOME INSECURITY: HOW HARD IS IT FOR YOU TO PAY FOR THE VERY BASICS LIKE FOOD, HOUSING, MEDICAL CARE, AND HEATING?: NOT HARD AT ALL

## 2023-09-27 SDOH — ECONOMIC STABILITY: FOOD INSECURITY: WITHIN THE PAST 12 MONTHS, THE FOOD YOU BOUGHT JUST DIDN'T LAST AND YOU DIDN'T HAVE MONEY TO GET MORE.: NEVER TRUE

## 2023-09-27 ASSESSMENT — PATIENT HEALTH QUESTIONNAIRE - PHQ9
SUM OF ALL RESPONSES TO PHQ QUESTIONS 1-9: 0
SUM OF ALL RESPONSES TO PHQ9 QUESTIONS 1 & 2: 0
2. FEELING DOWN, DEPRESSED OR HOPELESS: 0
SUM OF ALL RESPONSES TO PHQ QUESTIONS 1-9: 0
1. LITTLE INTEREST OR PLEASURE IN DOING THINGS: 0

## 2023-09-28 ASSESSMENT — ENCOUNTER SYMPTOMS
DIARRHEA: 0
VOMITING: 0
SHORTNESS OF BREATH: 0
CHEST TIGHTNESS: 0
ABDOMINAL PAIN: 0
COLOR CHANGE: 0
COUGH: 0
CONSTIPATION: 0
WHEEZING: 0
SORE THROAT: 0
EYE DISCHARGE: 0
NAUSEA: 0

## 2023-10-02 NOTE — PROGRESS NOTES
[address] [facility], [facility], [address]  [address] [facility] [address] [facility]   FOLLOW UP NOTE     PATIENT NAME:  Kole Chen  MRN:                     [id number]  DATE:                   09/20/2023     REASON FOR VISIT:      Endometrial Cancer  On treatment follow up visit          *** PARTIALLY PREPPED ***  . .. notes, labs, imaging, media, care everywhere . .. HISTORY OF PRESENT ILLNESS:      Kole Chen is a 65yo with a Stage IA, grade 3, Mixed carcinoma of the UTERUS (Diagnosed 2016). She has been treated with a DAWSON, BSO, LND, Omentectomy, Appendectomy, Partial small bowel resection (10/19/16), under the care of Dr. Osmany Martinez and Dr. Corey Candelaria. She completed 6 cycles of Carboplatin + Taxol (C6: 03/18/17), under the care of Dr. Staci Berry. Recurrence established biopsy of LEFT abdominal wall mass (05/13/22). She completed a 2 month treatment with Arimidex, followed by radiation therapy to the left mid-lateral abdominal wall mass and left lower posterior chest wall / paraspinal mass (COMPLETION DATE: 06/24/22), under the care of Dr. Yamilex Finn, followed by 6 cycles of Carboplatin + Taxol (C6: 10/24/22), under the care of Dr. Staci Berry. She then restarted Arimidex (START DATE: November 2022 to the presents). She then demonstrated findings of recurrence in a a hyperavid 1cm left external iliac lymph node, based on PET/CT-scan (03/28/23). She completed treatment with 3000cGy EBRT to the LEFT pelvic LN (COMPLETION DATE: 05/17/23), by Dr. Yamilex Finn. She presents, unaccompanied, to the office today. RECORDS REQUEST:  Final pathology from abdominal wall biopsy completed on 05/13/22  Most recent visit note from Dr. Pavel Carney [facility])    ? When did you start the [address] ? ? Are you still taking the Lenvima ?   Lenvatinib (Levima) 20 mg PO daily + Pembrolizumab (Keytruda) 200 mg D1 every 21 days until progression or
Review of Systems   Constitutional:  Positive for fatigue. Respiratory: Negative. Cardiovascular: Negative. Gastrointestinal:  Positive for nausea. Endocrine:        Night sweats   Musculoskeletal:  Positive for back pain and flank pain. Skin: Negative. Neurological:  Positive for dizziness (when bp is down).
err
November 2022), under the care of Dr. Isabel Sanchez     Referred to [facility]necologic [facility]cology on 03/31/23 by Dr. Rowena Julien consult with Dr. Priscilla Epley on 04/03/23: Discussed SBRT to the new left external iliac LN but also discussed the option to remain on Arimidex with change in systemic therapy if there was further progression. She does not have any further follow-up with Dr. Jazzy Porras:  IMAGING EVALUATION:  CT-scan CAP (IV contrast) (06/27/23)  COMPARISON: PET/CT-scan 03/28/23 and 04/20/22  Small scattered mediastinal lymph nodes  At least small hernia at the esophogastric junction  Linear densities subpleural parenchyma lung bases, more likely representing areas of mild scarring or atelectasis, unchanged  6mm focal area of low density identified within the right lobe of the liver inferolaterally remains unchanged. Liver is borderline enlarged  Mild to moderate air and stool retention identified throughout the colon  Small area of diastases involving the superior ventral abdominal wall, unchanged  Small left greater than right fat-containing inguinal canal hernias  Small groin lymph nodes  1.4x1.6cm mixed density and subtle asymmetry localizing to the oblique musculature of the posterior lateral abdominal pelvic muscular wall. This area did demonstrate high glucose affinity on prior PET/CT-scan in 2022. More recent PET/CT-scan demonstrated weakly positive glucose uptake and had a similar appearance as to today's study on corresponding CT-scan. On prior exam this measured approximately 1.4x2cm in size and may have slightly decreased in size or remains persistent  Mild to moderate SI joint arthropathy  Mild pubic symphysitis  Moderate diffuse degenerative spur visualization throughout the spine more pronounced involving the thoracic spine and thoracolumbar junction. There is prominent sclerotic focus at T11 and may represent a sclerotic metastatic deposition.  This did not

## 2023-10-05 ENCOUNTER — HOSPITAL ENCOUNTER (OUTPATIENT)
Age: 68
Setting detail: SPECIMEN
Discharge: HOME | End: 2023-10-05

## 2023-10-13 ENCOUNTER — OFFICE VISIT (OUTPATIENT)
Dept: FAMILY MEDICINE CLINIC | Age: 68
End: 2023-10-13

## 2023-10-13 VITALS
WEIGHT: 187 LBS | OXYGEN SATURATION: 96 % | BODY MASS INDEX: 35.33 KG/M2 | TEMPERATURE: 97.3 F | SYSTOLIC BLOOD PRESSURE: 138 MMHG | HEART RATE: 96 BPM | DIASTOLIC BLOOD PRESSURE: 88 MMHG

## 2023-10-13 DIAGNOSIS — C55 METASTASIS FROM CANCER OF UTERUS (HCC): ICD-10-CM

## 2023-10-13 DIAGNOSIS — I10 PRIMARY HYPERTENSION: ICD-10-CM

## 2023-10-13 DIAGNOSIS — C79.9 METASTASIS FROM CANCER OF UTERUS (HCC): ICD-10-CM

## 2023-10-13 DIAGNOSIS — R79.89 LOW TSH LEVEL: Primary | ICD-10-CM

## 2023-10-13 SDOH — ECONOMIC STABILITY: FOOD INSECURITY: WITHIN THE PAST 12 MONTHS, YOU WORRIED THAT YOUR FOOD WOULD RUN OUT BEFORE YOU GOT MONEY TO BUY MORE.: NEVER TRUE

## 2023-10-13 SDOH — ECONOMIC STABILITY: FOOD INSECURITY: WITHIN THE PAST 12 MONTHS, THE FOOD YOU BOUGHT JUST DIDN'T LAST AND YOU DIDN'T HAVE MONEY TO GET MORE.: NEVER TRUE

## 2023-10-13 SDOH — ECONOMIC STABILITY: INCOME INSECURITY: HOW HARD IS IT FOR YOU TO PAY FOR THE VERY BASICS LIKE FOOD, HOUSING, MEDICAL CARE, AND HEATING?: NOT HARD AT ALL

## 2023-10-13 ASSESSMENT — PATIENT HEALTH QUESTIONNAIRE - PHQ9
SUM OF ALL RESPONSES TO PHQ9 QUESTIONS 1 & 2: 1
2. FEELING DOWN, DEPRESSED OR HOPELESS: 1
SUM OF ALL RESPONSES TO PHQ QUESTIONS 1-9: 1
1. LITTLE INTEREST OR PLEASURE IN DOING THINGS: 0
SUM OF ALL RESPONSES TO PHQ QUESTIONS 1-9: 1

## 2023-10-13 ASSESSMENT — ENCOUNTER SYMPTOMS
EYE DISCHARGE: 0
DIARRHEA: 0
ABDOMINAL PAIN: 0
SORE THROAT: 0
WHEEZING: 0
COLOR CHANGE: 0
VOMITING: 0
COUGH: 0
NAUSEA: 0
SHORTNESS OF BREATH: 0
CONSTIPATION: 0
CHEST TIGHTNESS: 0

## 2023-10-13 NOTE — PROGRESS NOTES
Future  -     PTH, Intact with Ionized Calcium; Future  -     Vitamin D 25 Hydroxy; Future    2. Metastasis from cancer of uterus ([address])  3. Primary hypertension  -BP well controlled on current regimen      Return if symptoms worsen or fail to improve. An electronic signature was used to authenticate this note.     --GARTH Bach - CNP

## 2023-10-16 LAB — SURGICAL PATHOLOGY REPORT: NORMAL

## 2023-10-17 LAB
CALCIUM IONIZED: 5 MG/DL
PTH INTACT: 124.2 PG/ML
T4 FREE: 1.87 NG/DL
TSH SERPL DL<=0.05 MIU/L-ACNC: <0.015 MIU/L
VITAMIN D 25-HYDROXY: 50.7 NG/ML

## 2023-10-18 ENCOUNTER — HOSPITAL ENCOUNTER (OUTPATIENT)
Dept: ULTRASOUND IMAGING | Age: 68
Discharge: HOME | End: 2023-10-20
Payer: MEDICARE

## 2023-10-18 DIAGNOSIS — R79.89 LOW TSH LEVEL: ICD-10-CM

## 2023-10-18 PROCEDURE — 76536 US EXAM OF HEAD AND NECK: CPT

## 2023-10-21 DIAGNOSIS — I10 PRIMARY HYPERTENSION: ICD-10-CM

## 2023-10-21 RX ORDER — LOSARTAN POTASSIUM 50 MG/1
50 TABLET ORAL 2 TIMES DAILY
Qty: 90 TABLET | Refills: 1 | Status: SHIPPED | OUTPATIENT
Start: 2023-10-21

## 2023-10-21 NOTE — TELEPHONE ENCOUNTER
Pharmacy requesting 90 days    Jessica Minus is calling to request a refill on the following medication(s):    Medication Request:  Requested Prescriptions     Pending Prescriptions Disp Refills    losartan (COZAAR) 50 MG tablet 90 tablet 1     Sig: Take 1 tablet by mouth in the morning and at bedtime       Last Visit Date (If Applicable):  29/92/0368    Next Visit Date:    2/19/2024

## 2023-10-24 ENCOUNTER — TELEPHONE (OUTPATIENT)
Dept: FAMILY MEDICINE CLINIC | Age: 68
End: 2023-10-24

## 2023-10-24 DIAGNOSIS — I10 PRIMARY HYPERTENSION: ICD-10-CM

## 2023-10-24 RX ORDER — AMLODIPINE BESYLATE 5 MG/1
5 TABLET ORAL DAILY
Qty: 30 TABLET | Refills: 0 | Status: SHIPPED | OUTPATIENT
Start: 2023-10-24

## 2023-10-24 NOTE — TELEPHONE ENCOUNTER
Lowell Lees is calling to request a refill on the following medication(s):    Medication Request:  Requested Prescriptions     Pending Prescriptions Disp Refills    amLODIPine (NORVASC) 5 MG tablet [Pharmacy Med Name: amLODIPine BESYLATE 5 MG TAB] 30 tablet 0     Sig: TAKE 1 TABLET BY MOUTH DAILY       Last Visit Date (If Applicable):  54/99/9497    Next Visit Date:    2/19/2024

## 2023-10-30 DIAGNOSIS — E04.1 THYROID NODULE: Primary | ICD-10-CM

## 2023-10-30 NOTE — PROGRESS NOTES
I called and spoke to Ulises Guajardo RN at Dr Ajay Lima office regarding some confusion about testing/next steps for pt. Pt was in our office 10/13 stating that she was told she needed to go to PCP to have thyroid evaluated d/t low TSH, however she was given endo referral to Dr Sherif Fernandez at [facility] from oncology. The confusion comes down to did pt receive a thyroid US and biopsy as previously documented by oncology; per oncology office the biopsy was denied by insurance. I plan to order biopsy and contact pt regarding this to ensure she is UTD.

## 2023-11-16 ENCOUNTER — OFFICE VISIT (OUTPATIENT)
Dept: GYNECOLOGIC ONCOLOGY | Age: 68
End: 2023-11-16
Payer: MEDICARE

## 2023-11-16 VITALS
DIASTOLIC BLOOD PRESSURE: 101 MMHG | SYSTOLIC BLOOD PRESSURE: 154 MMHG | HEIGHT: 61 IN | HEART RATE: 95 BPM | WEIGHT: 184 LBS | OXYGEN SATURATION: 98 % | BODY MASS INDEX: 34.74 KG/M2

## 2023-11-16 DIAGNOSIS — G89.3 CANCER RELATED PAIN: ICD-10-CM

## 2023-11-16 DIAGNOSIS — E66.01 SEVERE OBESITY (BMI 35.0-39.9) WITH COMORBIDITY (HCC): ICD-10-CM

## 2023-11-16 DIAGNOSIS — E05.90 HYPERTHYROIDISM: ICD-10-CM

## 2023-11-16 DIAGNOSIS — M85.859 OSTEOPENIA OF HIP, UNSPECIFIED LATERALITY: ICD-10-CM

## 2023-11-16 DIAGNOSIS — C55 METASTASIS FROM CANCER OF UTERUS (HCC): Primary | ICD-10-CM

## 2023-11-16 DIAGNOSIS — I10 PRIMARY HYPERTENSION: ICD-10-CM

## 2023-11-16 DIAGNOSIS — R10.9 LEFT SIDED ABDOMINAL PAIN: ICD-10-CM

## 2023-11-16 DIAGNOSIS — Z23 NEED FOR SHINGLES VACCINE: ICD-10-CM

## 2023-11-16 DIAGNOSIS — C79.9 METASTASIS FROM CANCER OF UTERUS (HCC): Primary | ICD-10-CM

## 2023-11-16 DIAGNOSIS — R19.7 DIARRHEA, UNSPECIFIED TYPE: ICD-10-CM

## 2023-11-16 PROCEDURE — 3017F COLORECTAL CA SCREEN DOC REV: CPT | Performed by: OBSTETRICS & GYNECOLOGY

## 2023-11-16 PROCEDURE — 3080F DIAST BP >= 90 MM HG: CPT | Performed by: OBSTETRICS & GYNECOLOGY

## 2023-11-16 PROCEDURE — G8484 FLU IMMUNIZE NO ADMIN: HCPCS | Performed by: OBSTETRICS & GYNECOLOGY

## 2023-11-16 PROCEDURE — 99215 OFFICE O/P EST HI 40 MIN: CPT | Performed by: OBSTETRICS & GYNECOLOGY

## 2023-11-16 PROCEDURE — G8427 DOCREV CUR MEDS BY ELIG CLIN: HCPCS | Performed by: OBSTETRICS & GYNECOLOGY

## 2023-11-16 PROCEDURE — 1090F PRES/ABSN URINE INCON ASSESS: CPT | Performed by: OBSTETRICS & GYNECOLOGY

## 2023-11-16 PROCEDURE — 3077F SYST BP >= 140 MM HG: CPT | Performed by: OBSTETRICS & GYNECOLOGY

## 2023-11-16 PROCEDURE — G8417 CALC BMI ABV UP PARAM F/U: HCPCS | Performed by: OBSTETRICS & GYNECOLOGY

## 2023-11-16 PROCEDURE — G8399 PT W/DXA RESULTS DOCUMENT: HCPCS | Performed by: OBSTETRICS & GYNECOLOGY

## 2023-11-16 PROCEDURE — 1036F TOBACCO NON-USER: CPT | Performed by: OBSTETRICS & GYNECOLOGY

## 2023-11-16 PROCEDURE — 1123F ACP DISCUSS/DSCN MKR DOCD: CPT | Performed by: OBSTETRICS & GYNECOLOGY

## 2023-11-16 RX ORDER — METHIMAZOLE 10 MG/1
TABLET ORAL
COMMUNITY
Start: 2023-10-26

## 2023-11-20 DIAGNOSIS — I10 PRIMARY HYPERTENSION: ICD-10-CM

## 2023-11-20 RX ORDER — AMLODIPINE BESYLATE 5 MG/1
5 TABLET ORAL DAILY
Qty: 30 TABLET | Refills: 0 | Status: SHIPPED | OUTPATIENT
Start: 2023-11-20

## 2023-11-20 RX ORDER — LOSARTAN POTASSIUM 50 MG/1
TABLET ORAL
Qty: 60 TABLET | OUTPATIENT
Start: 2023-11-20

## 2023-11-20 NOTE — TELEPHONE ENCOUNTER
Cassaundra Klinefelter is calling to request a refill on the following medication(s):    Medication Request:  Requested Prescriptions     Pending Prescriptions Disp Refills    amLODIPine (NORVASC) 5 MG tablet 30 tablet 0     Sig: Take 1 tablet by mouth daily       Last Visit Date (If Applicable):  97/75/1256    Next Visit Date:    2/19/2024

## 2023-11-22 DIAGNOSIS — I10 PRIMARY HYPERTENSION: ICD-10-CM

## 2023-11-22 RX ORDER — AMLODIPINE BESYLATE 5 MG/1
5 TABLET ORAL DAILY
Qty: 30 TABLET | Refills: 0 | OUTPATIENT
Start: 2023-11-22

## 2023-11-30 ENCOUNTER — HOSPITAL ENCOUNTER (OUTPATIENT)
Dept: ULTRASOUND IMAGING | Age: 68
Discharge: HOME | End: 2023-12-02
Payer: MEDICARE

## 2023-11-30 VITALS — DIASTOLIC BLOOD PRESSURE: 96 MMHG | SYSTOLIC BLOOD PRESSURE: 137 MMHG

## 2023-11-30 DIAGNOSIS — E04.1 THYROID NODULE: ICD-10-CM

## 2023-11-30 PROCEDURE — 76942 ECHO GUIDE FOR BIOPSY: CPT

## 2023-11-30 NOTE — FLOWSHEET NOTE
Pt tolerated procedure without distress. Dressing applied to procedure site. Discharge instructions reviewed understanding verbalized, pt released ambulatory in nad.

## 2023-12-01 RX ORDER — OMEPRAZOLE 20 MG/1
CAPSULE, DELAYED RELEASE ORAL
Qty: 180 CAPSULE | Refills: 0 | Status: SHIPPED | OUTPATIENT
Start: 2023-12-01

## 2023-12-01 NOTE — TELEPHONE ENCOUNTER
Grabiel Heck is calling to request a refill on the following medication(s):    Medication Request:  Requested Prescriptions     Pending Prescriptions Disp Refills    omeprazole (PRILOSEC) 20 MG delayed release capsule [Pharmacy Med Name: OMEPRAZOLE DR 20 MG CAPSULE] 180 capsule      Sig: TAKE ONE CAPSULE BY MOUTH TWICE A DAY BEFORE MEALS       Last Visit Date (If Applicable):  02/39/3651    Next Visit Date:    2/19/2024

## 2023-12-04 LAB
SURGICAL PATHOLOGY REPORT: NORMAL
SURGICAL PATHOLOGY REPORT: NORMAL

## 2023-12-19 DIAGNOSIS — I10 PRIMARY HYPERTENSION: ICD-10-CM

## 2023-12-20 RX ORDER — LOSARTAN POTASSIUM 50 MG/1
TABLET ORAL
Qty: 60 TABLET | OUTPATIENT
Start: 2023-12-20

## 2024-01-16 DIAGNOSIS — I10 PRIMARY HYPERTENSION: ICD-10-CM

## 2024-01-16 RX ORDER — AMLODIPINE BESYLATE 5 MG/1
5 TABLET ORAL DAILY
Qty: 90 TABLET | Refills: 0 | Status: SHIPPED | OUTPATIENT
Start: 2024-01-16

## 2024-01-16 NOTE — TELEPHONE ENCOUNTER
Keaton Wong is calling to request a refill on the following medication(s):    Medication Request:  Requested Prescriptions     Pending Prescriptions Disp Refills    amLODIPine (NORVASC) 5 MG tablet [Pharmacy Med Name: amLODIPine BESYLATE 5 MG TAB] 30 tablet 0     Sig: TAKE 1 TABLET BY MOUTH DAILY       Last Visit Date (If Applicable):  10/13/2023    Next Visit Date:    2/23/2024

## 2024-02-01 ENCOUNTER — OFFICE VISIT (OUTPATIENT)
Dept: GYNECOLOGIC ONCOLOGY | Age: 69
End: 2024-02-01
Payer: MEDICARE

## 2024-02-01 VITALS
TEMPERATURE: 96.8 F | DIASTOLIC BLOOD PRESSURE: 81 MMHG | SYSTOLIC BLOOD PRESSURE: 124 MMHG | HEART RATE: 102 BPM | OXYGEN SATURATION: 93 % | WEIGHT: 185.8 LBS | HEIGHT: 61 IN | BODY MASS INDEX: 35.08 KG/M2

## 2024-02-01 DIAGNOSIS — E66.01 SEVERE OBESITY (BMI 35.0-39.9) WITH COMORBIDITY (HCC): ICD-10-CM

## 2024-02-01 DIAGNOSIS — E03.2 HYPOTHYROIDISM DUE TO MEDICATION: ICD-10-CM

## 2024-02-01 DIAGNOSIS — Z23 NEED FOR SHINGLES VACCINE: ICD-10-CM

## 2024-02-01 DIAGNOSIS — G89.3 CANCER RELATED PAIN: ICD-10-CM

## 2024-02-01 DIAGNOSIS — M85.859 OSTEOPENIA OF HIP, UNSPECIFIED LATERALITY: ICD-10-CM

## 2024-02-01 DIAGNOSIS — C79.9 METASTASIS FROM CANCER OF UTERUS (HCC): Primary | ICD-10-CM

## 2024-02-01 DIAGNOSIS — C55 METASTASIS FROM CANCER OF UTERUS (HCC): Primary | ICD-10-CM

## 2024-02-01 DIAGNOSIS — R10.9 LEFT SIDED ABDOMINAL PAIN: ICD-10-CM

## 2024-02-01 PROCEDURE — G8484 FLU IMMUNIZE NO ADMIN: HCPCS | Performed by: OBSTETRICS & GYNECOLOGY

## 2024-02-01 PROCEDURE — 3017F COLORECTAL CA SCREEN DOC REV: CPT | Performed by: OBSTETRICS & GYNECOLOGY

## 2024-02-01 PROCEDURE — 3074F SYST BP LT 130 MM HG: CPT | Performed by: OBSTETRICS & GYNECOLOGY

## 2024-02-01 PROCEDURE — G8427 DOCREV CUR MEDS BY ELIG CLIN: HCPCS | Performed by: OBSTETRICS & GYNECOLOGY

## 2024-02-01 PROCEDURE — G8399 PT W/DXA RESULTS DOCUMENT: HCPCS | Performed by: OBSTETRICS & GYNECOLOGY

## 2024-02-01 PROCEDURE — 1036F TOBACCO NON-USER: CPT | Performed by: OBSTETRICS & GYNECOLOGY

## 2024-02-01 PROCEDURE — 1123F ACP DISCUSS/DSCN MKR DOCD: CPT | Performed by: OBSTETRICS & GYNECOLOGY

## 2024-02-01 PROCEDURE — 1090F PRES/ABSN URINE INCON ASSESS: CPT | Performed by: OBSTETRICS & GYNECOLOGY

## 2024-02-01 PROCEDURE — G8417 CALC BMI ABV UP PARAM F/U: HCPCS | Performed by: OBSTETRICS & GYNECOLOGY

## 2024-02-01 PROCEDURE — 99215 OFFICE O/P EST HI 40 MIN: CPT | Performed by: OBSTETRICS & GYNECOLOGY

## 2024-02-01 PROCEDURE — 3079F DIAST BP 80-89 MM HG: CPT | Performed by: OBSTETRICS & GYNECOLOGY

## 2024-02-01 RX ORDER — FUROSEMIDE 20 MG/1
20 TABLET ORAL DAILY PRN
COMMUNITY
Start: 2024-01-31

## 2024-02-01 RX ORDER — ACETAMINOPHEN 160 MG
1 TABLET,DISINTEGRATING ORAL DAILY
COMMUNITY

## 2024-02-20 SDOH — HEALTH STABILITY: PHYSICAL HEALTH: ON AVERAGE, HOW MANY DAYS PER WEEK DO YOU ENGAGE IN MODERATE TO STRENUOUS EXERCISE (LIKE A BRISK WALK)?: 0 DAYS

## 2024-02-20 ASSESSMENT — LIFESTYLE VARIABLES
HOW MANY STANDARD DRINKS CONTAINING ALCOHOL DO YOU HAVE ON A TYPICAL DAY: 1 OR 2
HOW OFTEN DO YOU HAVE A DRINK CONTAINING ALCOHOL: MONTHLY OR LESS
HOW OFTEN DO YOU HAVE SIX OR MORE DRINKS ON ONE OCCASION: 1
HOW MANY STANDARD DRINKS CONTAINING ALCOHOL DO YOU HAVE ON A TYPICAL DAY: 1
HOW OFTEN DO YOU HAVE A DRINK CONTAINING ALCOHOL: 2

## 2024-02-20 ASSESSMENT — PATIENT HEALTH QUESTIONNAIRE - PHQ9
SUM OF ALL RESPONSES TO PHQ QUESTIONS 1-9: 0
SUM OF ALL RESPONSES TO PHQ9 QUESTIONS 1 & 2: 0
2. FEELING DOWN, DEPRESSED OR HOPELESS: 0
1. LITTLE INTEREST OR PLEASURE IN DOING THINGS: 0
SUM OF ALL RESPONSES TO PHQ QUESTIONS 1-9: 0

## 2024-02-23 ENCOUNTER — OFFICE VISIT (OUTPATIENT)
Dept: FAMILY MEDICINE CLINIC | Age: 69
End: 2024-02-23

## 2024-02-23 VITALS
HEART RATE: 79 BPM | TEMPERATURE: 97.8 F | BODY MASS INDEX: 35.07 KG/M2 | DIASTOLIC BLOOD PRESSURE: 82 MMHG | WEIGHT: 182.6 LBS | OXYGEN SATURATION: 95 % | SYSTOLIC BLOOD PRESSURE: 134 MMHG

## 2024-02-23 DIAGNOSIS — C55 METASTASIS FROM CANCER OF UTERUS (HCC): ICD-10-CM

## 2024-02-23 DIAGNOSIS — Z00.00 MEDICARE ANNUAL WELLNESS VISIT, SUBSEQUENT: Primary | ICD-10-CM

## 2024-02-23 DIAGNOSIS — G57.11 MERALGIA PARESTHETICA OF RIGHT SIDE: ICD-10-CM

## 2024-02-23 DIAGNOSIS — D84.9 IMMUNOCOMPROMISED (HCC): ICD-10-CM

## 2024-02-23 DIAGNOSIS — E66.01 SEVERE OBESITY (BMI 35.0-39.9) WITH COMORBIDITY (HCC): ICD-10-CM

## 2024-02-23 DIAGNOSIS — Z78.9 POOR TOLERANCE FOR AMBULATION: ICD-10-CM

## 2024-02-23 DIAGNOSIS — G62.0 DRUG-INDUCED POLYNEUROPATHY (HCC): ICD-10-CM

## 2024-02-23 DIAGNOSIS — I10 PRIMARY HYPERTENSION: ICD-10-CM

## 2024-02-23 DIAGNOSIS — C79.9 METASTASIS FROM CANCER OF UTERUS (HCC): ICD-10-CM

## 2024-02-23 DIAGNOSIS — E05.90 HYPERTHYROIDISM: ICD-10-CM

## 2024-02-23 RX ORDER — PROCHLORPERAZINE MALEATE 10 MG
TABLET ORAL
COMMUNITY
Start: 2024-01-24

## 2024-02-23 RX ORDER — PREDNISONE 10 MG/1
TABLET ORAL
COMMUNITY
Start: 2024-01-10

## 2024-02-23 SDOH — ECONOMIC STABILITY: FOOD INSECURITY: WITHIN THE PAST 12 MONTHS, YOU WORRIED THAT YOUR FOOD WOULD RUN OUT BEFORE YOU GOT MONEY TO BUY MORE.: NEVER TRUE

## 2024-02-23 SDOH — ECONOMIC STABILITY: FOOD INSECURITY: WITHIN THE PAST 12 MONTHS, THE FOOD YOU BOUGHT JUST DIDN'T LAST AND YOU DIDN'T HAVE MONEY TO GET MORE.: NEVER TRUE

## 2024-02-23 SDOH — ECONOMIC STABILITY: INCOME INSECURITY: HOW HARD IS IT FOR YOU TO PAY FOR THE VERY BASICS LIKE FOOD, HOUSING, MEDICAL CARE, AND HEATING?: NOT HARD AT ALL

## 2024-02-23 NOTE — PROGRESS NOTES
Medicare Annual Wellness Visit    Keaton Wong is here for Follow-up Chronic Condition and Medicare AWV    Assessment & Plan   Medicare annual wellness visit, subsequent    Primary hypertension  -stable    Metastasis from cancer of uterus (HCC)  Hyperthyroidism  Immunocompromised (HCC)  Drug-induced polyneuropathy (HCC)  Meralgia paresthetica of right side  Poor tolerance for ambulation  -since last OV pt switched oncologists and is doing remarkably well  -tolerating new chemo, pain improved, energy improved  -following w/ endo for management of hyperthyroid d/t previous chemo  -pt in need of motorized scooter for prolonged outdoor walking  -in need of functional capacity screening per scooter company    -     Ambulatory referral to Physical Therapy    Severe obesity (BMI 35.0-39.9) with comorbidity (HCC)    Recommendations for Preventive Services Due: see orders and patient instructions/AVS.  Recommended screening schedule for the next 5-10 years is provided to the patient in written form: see Patient Instructions/AVS.     Return in about 6 months (around 8/23/2024), or if symptoms worsen or fail to improve, for chronic conditions.     Subjective   The following acute and/or chronic problems were also addressed today:    See above    Patient's complete Health Risk Assessment and screening values have been reviewed and are found in Flowsheets. The following problems were reviewed today and where indicated follow up appointments were made and/or referrals ordered.    Positive Risk Factor Screenings with Interventions:    Fall Risk:  Do you feel unsteady or are you worried about falling? : (!) yes  2 or more falls in past year?: no  Fall with injury in past year?: no     Interventions:    Patient comments: pt working towards getting motorized scooter, recently got [employer]  Reviewed medications, home hazards, visual acuity, and co-morbidities that can increase risk for falls         Controlled Medication

## 2024-02-23 NOTE — PATIENT INSTRUCTIONS
the bathroom with you.   Where can you learn more?  Go to [email or web address] and enter G117 to learn more about \"Preventing Falls: Care Instructions.\"  Current as of: July 18, 2023               Content Version: 13.9  © 9318-8655 [employer].   Care instructions adapted under license by [employer]. If you have questions about a medical condition or this instruction, always ask your healthcare professional. [employer] disclaims any warranty or liability for your use of this information.           Learning About Vision Tests  What are vision tests?     The four most common vision tests are visual acuity tests, refraction, visual field tests, and color vision tests.  Visual acuity (sharpness) tests  These tests are used:  To see if you need glasses or contact lenses.  To monitor an eye problem.  To check an eye injury.  Visual acuity tests are done as part of routine exams. You may also have this test when you get your 's license or apply for some types of jobs.  Visual field tests  These tests are used:  To check for vision loss in any area of your range of vision.  To screen for certain eye diseases.  To look for nerve damage after a stroke, head injury, or other problem that could reduce blood flow to the brain.  Refraction and color tests  A refraction test is done to find the right prescription for glasses and contact lenses.  A color vision test is done to check for color blindness.  Color vision is often tested as part of a routine exam. You may also have this test when you apply for a job where recognizing different colors is important, such as , electronics, or the .  How are vision tests done?  Visual acuity test   You cover one eye at a time.  You read aloud from a wall chart across the room.  You read aloud from a small card that you hold in your hand.  Refraction   You look into a special device.  The device puts lenses of different

## 2024-02-26 RX ORDER — OMEPRAZOLE 20 MG/1
CAPSULE, DELAYED RELEASE ORAL
Qty: 180 CAPSULE | Refills: 0 | Status: SHIPPED | OUTPATIENT
Start: 2024-02-26

## 2024-03-06 ENCOUNTER — HOSPITAL ENCOUNTER (OUTPATIENT)
Dept: PHYSICAL THERAPY | Age: 69
Setting detail: THERAPIES SERIES
Discharge: HOME | End: 2024-03-06

## 2024-03-06 ENCOUNTER — INITIAL CONSULT (OUTPATIENT)
Dept: PHYSICAL MEDICINE AND REHAB | Age: 69
End: 2024-03-06
Payer: MEDICARE

## 2024-03-06 VITALS
BODY MASS INDEX: 34.19 KG/M2 | TEMPERATURE: 97.6 F | SYSTOLIC BLOOD PRESSURE: 129 MMHG | DIASTOLIC BLOOD PRESSURE: 87 MMHG | HEART RATE: 95 BPM | WEIGHT: 178 LBS

## 2024-03-06 DIAGNOSIS — C54.1 ENDOMETRIAL CANCER DETERMINED BY UTERINE BIOPSY (HCC): ICD-10-CM

## 2024-03-06 DIAGNOSIS — G62.0 DRUG-INDUCED POLYNEUROPATHY (HCC): ICD-10-CM

## 2024-03-06 DIAGNOSIS — R53.1 GENERALIZED WEAKNESS: Primary | ICD-10-CM

## 2024-03-06 PROCEDURE — G8417 CALC BMI ABV UP PARAM F/U: HCPCS | Performed by: STUDENT IN AN ORGANIZED HEALTH CARE EDUCATION/TRAINING PROGRAM

## 2024-03-06 PROCEDURE — 1036F TOBACCO NON-USER: CPT | Performed by: STUDENT IN AN ORGANIZED HEALTH CARE EDUCATION/TRAINING PROGRAM

## 2024-03-06 PROCEDURE — 1123F ACP DISCUSS/DSCN MKR DOCD: CPT | Performed by: STUDENT IN AN ORGANIZED HEALTH CARE EDUCATION/TRAINING PROGRAM

## 2024-03-06 PROCEDURE — 3017F COLORECTAL CA SCREEN DOC REV: CPT | Performed by: STUDENT IN AN ORGANIZED HEALTH CARE EDUCATION/TRAINING PROGRAM

## 2024-03-06 PROCEDURE — G8427 DOCREV CUR MEDS BY ELIG CLIN: HCPCS | Performed by: STUDENT IN AN ORGANIZED HEALTH CARE EDUCATION/TRAINING PROGRAM

## 2024-03-06 PROCEDURE — G8399 PT W/DXA RESULTS DOCUMENT: HCPCS | Performed by: STUDENT IN AN ORGANIZED HEALTH CARE EDUCATION/TRAINING PROGRAM

## 2024-03-06 PROCEDURE — G8484 FLU IMMUNIZE NO ADMIN: HCPCS | Performed by: STUDENT IN AN ORGANIZED HEALTH CARE EDUCATION/TRAINING PROGRAM

## 2024-03-06 PROCEDURE — 99204 OFFICE O/P NEW MOD 45 MIN: CPT | Performed by: STUDENT IN AN ORGANIZED HEALTH CARE EDUCATION/TRAINING PROGRAM

## 2024-03-06 PROCEDURE — 3079F DIAST BP 80-89 MM HG: CPT | Performed by: STUDENT IN AN ORGANIZED HEALTH CARE EDUCATION/TRAINING PROGRAM

## 2024-03-06 PROCEDURE — 3074F SYST BP LT 130 MM HG: CPT | Performed by: STUDENT IN AN ORGANIZED HEALTH CARE EDUCATION/TRAINING PROGRAM

## 2024-03-06 PROCEDURE — 1090F PRES/ABSN URINE INCON ASSESS: CPT | Performed by: STUDENT IN AN ORGANIZED HEALTH CARE EDUCATION/TRAINING PROGRAM

## 2024-03-06 NOTE — PROGRESS NOTES
[facility] PHYSICAL MEDICINE & REHABILITATION  [address] [address]  Dept: [phone]  Dept Fax: [phone]    Power Mobility Device Evaluation    Keaton Wong  1955 69 y.o.  3/6/2024    HPI:     Reason for visit:  Mobility Examination  Gait Abnormality     Ms. Keaton Wong presents with past medical history of endometrial cancer currently undergoing chemotherapy until June or July and drug-induced peripheral neuropathy.  She is requesting evaluation for a power scooter that she can use outside for longer-distance mobility.  Patient does not currently use an assistive device to navigate in the home.    The patient currently resides in a 1-level home.  The patient has not had any falls at home.  She is not on oxygen.  She does not require assistance for ADLs or IADLs.  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    Areas of pain or weakness:  She notes pain in the left hip with ambulating longer distances due to cancer.  She also reports feeling weaker for a few days after her treatments sometimes.     She states that she experiences fatigue and shortness of breath with walking longer distances as well.  She notes numbness/tingling in the feet and hands, worse on the left.      Past Medical History:   Diagnosis Date    Endometrial cancer (HCC)     Hyperlipidemia     Hypertension     Has never been on medication    Obese     Umbilical hernia       Past Surgical History:   Procedure Laterality Date    ABDOMINAL EXPLORATION SURGERY  10/19/2016    small bowel resection    APPENDECTOMY  10/19/2016    COLONOSCOPY  2011    [facility]    COLONOSCOPY N/A 04/09/2021    COLONOSCOPY POLYPECTOMY HOT BIOPSY performed by Theodore Castillo DO at [facility] OR    CYSTOSCOPY N/A 06/18/2021    CYSTOSCOPY TRANSURETHRAL RESECTION BLADDER TUMOR WITH GYRUS performed by Axel Luna MD at [facility] OR    ENDOMETRIAL BIOPSY  09/12/146    high grade

## 2024-03-07 NOTE — CARE COORDINATION
Met with patient in  clinic. She was not appropriate at this time for obtaining a powered wheelchair or scooter through medicare. Discussed options for self pay & [employer].     Ace Boss, PT, DPT   #[phone]

## 2024-03-20 ENCOUNTER — TELEPHONE (OUTPATIENT)
Dept: FAMILY MEDICINE CLINIC | Age: 69
End: 2024-03-20

## 2024-03-20 DIAGNOSIS — H69.91 DYSFUNCTION OF RIGHT EUSTACHIAN TUBE: Primary | ICD-10-CM

## 2024-03-20 NOTE — TELEPHONE ENCOUNTER
Patient is taking claritin and flonase, it's not working. Patient advised of referral and printed and faxed.

## 2024-03-20 NOTE — TELEPHONE ENCOUNTER
SUBJECT: ENT referral  PATIENT/CALLER: Keaton  CURRENT SYMPTOMS: Right ear clogged, can't hear, pops when blows nose  ONSET: one month  PAIN LEVEL:0  TEMP: n/a  WHAT HAS BEEN TRIED: oncologist has checked ear, there's no wax or infection and suggested she request a referral to ENT from you.

## 2024-03-20 NOTE — TELEPHONE ENCOUNTER
Ensure pt is taking daily antihistamine like Claritin, using Flonase. This will help the tube drain. I'll place ENT referral but she can choose to not go if sx improve.

## 2024-03-22 DIAGNOSIS — E78.00 PURE HYPERCHOLESTEROLEMIA: ICD-10-CM

## 2024-03-22 RX ORDER — ATORVASTATIN CALCIUM 10 MG/1
10 TABLET, FILM COATED ORAL DAILY
Qty: 90 TABLET | Refills: 1 | Status: SHIPPED | OUTPATIENT
Start: 2024-03-22

## 2024-03-22 NOTE — TELEPHONE ENCOUNTER
Keaton Wong is calling to request a refill on the following medication(s):    Medication Request:  Requested Prescriptions     Pending Prescriptions Disp Refills    atorvastatin (LIPITOR) 10 MG tablet [Pharmacy Med Name: ATORVASTATIN 10 MG TABLET] 90 tablet 1     Sig: TAKE 1 TABLET BY MOUTH DAILY       Last Visit Date (If Applicable):  2/23/2024    Next Visit Date:    8/23/2024

## 2024-04-11 DIAGNOSIS — I10 PRIMARY HYPERTENSION: ICD-10-CM

## 2024-04-11 RX ORDER — AMLODIPINE BESYLATE 5 MG/1
5 TABLET ORAL DAILY
Qty: 90 TABLET | Refills: 0 | Status: SHIPPED | OUTPATIENT
Start: 2024-04-11

## 2024-04-11 NOTE — TELEPHONE ENCOUNTER
Keaton Wong is calling to request a refill on the following medication(s):    Medication Request:  Requested Prescriptions     Pending Prescriptions Disp Refills    amLODIPine (NORVASC) 5 MG tablet [Pharmacy Med Name: AMLODIPINE BESYLATE 5 MG TAB] 90 tablet 0     Sig: TAKE 1 TABLET BY MOUTH DAILY       Last Visit Date (If Applicable):  2/23/2024    Next Visit Date:    8/23/2024

## 2024-04-17 ENCOUNTER — OFFICE VISIT (OUTPATIENT)
Dept: GYNECOLOGIC ONCOLOGY | Age: 69
End: 2024-04-17
Payer: MEDICARE

## 2024-04-17 VITALS
TEMPERATURE: 97.5 F | SYSTOLIC BLOOD PRESSURE: 132 MMHG | DIASTOLIC BLOOD PRESSURE: 83 MMHG | OXYGEN SATURATION: 100 % | BODY MASS INDEX: 33.35 KG/M2 | HEART RATE: 105 BPM | WEIGHT: 173.6 LBS

## 2024-04-17 DIAGNOSIS — C55 METASTASIS FROM CANCER OF UTERUS (HCC): Primary | ICD-10-CM

## 2024-04-17 DIAGNOSIS — K59.00 CONSTIPATION, UNSPECIFIED CONSTIPATION TYPE: ICD-10-CM

## 2024-04-17 DIAGNOSIS — M85.859 OSTEOPENIA OF HIP, UNSPECIFIED LATERALITY: ICD-10-CM

## 2024-04-17 DIAGNOSIS — Z23 NEED FOR SHINGLES VACCINE: ICD-10-CM

## 2024-04-17 DIAGNOSIS — C79.9 METASTASIS FROM CANCER OF UTERUS (HCC): Primary | ICD-10-CM

## 2024-04-17 DIAGNOSIS — E03.2 HYPOTHYROIDISM DUE TO MEDICATION: ICD-10-CM

## 2024-04-17 DIAGNOSIS — E66.01 SEVERE OBESITY (BMI 35.0-39.9) WITH COMORBIDITY (HCC): ICD-10-CM

## 2024-04-17 DIAGNOSIS — R10.9 LEFT SIDED ABDOMINAL PAIN: ICD-10-CM

## 2024-04-17 DIAGNOSIS — G89.3 CANCER RELATED PAIN: ICD-10-CM

## 2024-04-17 PROCEDURE — G8427 DOCREV CUR MEDS BY ELIG CLIN: HCPCS | Performed by: OBSTETRICS & GYNECOLOGY

## 2024-04-17 PROCEDURE — G8417 CALC BMI ABV UP PARAM F/U: HCPCS | Performed by: OBSTETRICS & GYNECOLOGY

## 2024-04-17 PROCEDURE — 3079F DIAST BP 80-89 MM HG: CPT | Performed by: OBSTETRICS & GYNECOLOGY

## 2024-04-17 PROCEDURE — 1036F TOBACCO NON-USER: CPT | Performed by: OBSTETRICS & GYNECOLOGY

## 2024-04-17 PROCEDURE — 99214 OFFICE O/P EST MOD 30 MIN: CPT | Performed by: OBSTETRICS & GYNECOLOGY

## 2024-04-17 PROCEDURE — 1123F ACP DISCUSS/DSCN MKR DOCD: CPT | Performed by: OBSTETRICS & GYNECOLOGY

## 2024-04-17 PROCEDURE — 3017F COLORECTAL CA SCREEN DOC REV: CPT | Performed by: OBSTETRICS & GYNECOLOGY

## 2024-04-17 PROCEDURE — 1090F PRES/ABSN URINE INCON ASSESS: CPT | Performed by: OBSTETRICS & GYNECOLOGY

## 2024-04-17 PROCEDURE — G8399 PT W/DXA RESULTS DOCUMENT: HCPCS | Performed by: OBSTETRICS & GYNECOLOGY

## 2024-04-17 PROCEDURE — 3075F SYST BP GE 130 - 139MM HG: CPT | Performed by: OBSTETRICS & GYNECOLOGY

## 2024-04-17 RX ORDER — LORATADINE 10 MG/1
10 TABLET ORAL DAILY
COMMUNITY

## 2024-04-17 RX ORDER — FLUTICASONE PROPIONATE 50 MCG
1 SPRAY, SUSPENSION (ML) NASAL DAILY
COMMUNITY

## 2024-04-17 RX ORDER — POTASSIUM CHLORIDE 1500 MG/1
20 TABLET, EXTENDED RELEASE ORAL DAILY
COMMUNITY
Start: 2024-04-03

## 2024-04-17 NOTE — PROGRESS NOTES
[facility] Gynecologic Oncology  [address] [address]  [place] [address]    GYNECOLOGIC ONCOLOGY   FOLLOW UP NOTE     PATIENT NAME:  Keaton Wong  MRN:                     [id number]  DATE:                   11/16/2023*     REASON FOR VISIT:      Endometrial Cancer  On treatment follow up visit       *** PARTIALLY PREPPED ***  ... notes, labs, imaging, media, care everywhere ...    !! NEED NOTE FROM Dr. Bo on 04/05/24 !!    CHART PREP FROM 02/01/24 TO PRESENT (Included here to reference during visit)    Visit note, Verena Quispe NP (02/23/24) [Primary Care]    Visit note, Vandana Richard NP (04/03/24)    Trastuzumab Deruxtecan (C3: 03/15/24. C4: 04/05/24)       (03/11/24): 30.8 (normal) => 16.6 (normal) (04/01/24)  CBC (04/01/24): Hemoglobin 11.3 (L) and Hematocrit 35.9 (L)  CMP (04/01/24): Chloride 111 (H), Total Protein 6.2 (L). Creatinine 0.7          HISTORY OF PRESENT ILLNESS:      Keaton Wong is a 67yo with a Stage IA, grade 3, Mixed carcinoma of the UTERUS (Diagnosed 2016). She has been treated with a DAWSON, BSO, LND, Omentectomy, Appendectomy, Partial small bowel resection (10/19/16), under the care of Dr. Abimbola Henderson and Dr. Jm Peres. She completed 6 cycles of Carboplatin + Taxol (C6: 03/18/17), under the care of Dr. Helga Clark. Recurrence established biopsy of LEFT abdominal wall mass (05/13/22). She completed a 2 month treatment with Arimidex, followed by radiation therapy to the left mid-lateral abdominal wall mass and left lower posterior chest wall / paraspinal mass (COMPLETION DATE: 06/24/22), under the care of Dr. Miguel Vidal, followed by 6 cycles of Carboplatin + Taxol (C6: 10/24/22), under the care of Dr. Helga Clark. She then restarted Arimidex (START DATE: November 2022 to the presents). She then demonstrated findings of recurrence in a a hyperavid 1cm left external iliac lymph node, based on PET/CT-scan (03/28/23). She completed treatment with 3000cGy 
Review of Systems   Constitutional:  Positive for appetite change (decreased appetite). Negative for chills, diaphoresis, fatigue, fever and unexpected weight change.   HENT:   Positive for hearing loss (Right ear hearing loss - seeing ENT). Negative for lump/mass, mouth sores, nosebleeds, sore throat, tinnitus, trouble swallowing and voice change.    Eyes:  Negative for eye problems and icterus.   Respiratory:  Negative for chest tightness, cough, hemoptysis, shortness of breath and wheezing.    Cardiovascular:  Positive for palpitations (one episode on Sunday - was taking pseudaphed at the time). Negative for chest pain and leg swelling.   Gastrointestinal:  Positive for constipation. Negative for abdominal distention, abdominal pain, blood in stool, diarrhea, nausea, rectal pain and vomiting.   Endocrine: Negative for hot flashes.   Genitourinary:  Negative for bladder incontinence, difficulty urinating, dyspareunia, dysuria, frequency, hematuria, menstrual problem, nocturia, pelvic pain, vaginal bleeding and vaginal discharge.    Musculoskeletal:  Positive for back pain and flank pain (left). Negative for arthralgias, gait problem, myalgias, neck pain and neck stiffness.   Skin:  Negative for itching, rash and wound.   Neurological:  Negative for dizziness, extremity weakness, gait problem, headaches, light-headedness, numbness, seizures and speech difficulty.   Hematological:  Negative for adenopathy. Bruises/bleeds easily (Bruise easily).   Psychiatric/Behavioral:  Negative for confusion, decreased concentration, depression, sleep disturbance and suicidal ideas. The patient is not nervous/anxious.        
area of low density identified inferolateral right lobe of the liver. Present on prior study on 04/07/17 and unchanged in size and location, measured at approximately 7mm  The colon demonstrates mild to moderate air and stool retention with mild colonic attention diffuse  No pericolonic fascial inflammatory changes  No evidence for mesenteric lymphadenopathy nor is there evidence of retroperitoneal or para-aortic node enlargement  Prominent anterior vascular plaque disposition within the abdominal aorta.   There is a subrenal abdominal aortic aneurysm with maximal transverse diameter now estimated to measure 2.1 cm in size  Moderate air and stool retention identified throughout the colon  Mild to moderate diverticulosis identified within the colon  Mild enlargement of the inguinal canals containing preperitoneal fat (left greater than right)  Broad-based diastases subumbilical ventral abdominal wall  Mild to moderate air and stool retention throughout the colon  Mild to moderate sclerosis with anterior osteophytic features diffusely involving the lower cervical thoracic junction of the spine and mid thoracic spine with mild height loss  CT-scan CAP (01/15/19)  Based on visit note by Dr. Helga Clark on 07/08/19  Small cyst in the right lobe of the liver is unchanged  2.1cm infrarenal abdominal aortic aneurysm is unchanged  Small amount of volume loss / atelectasis in the anterior aspect of the right mid to lower lung is new  CT-scan CAP (IV contrast) (07/07/20)  COMPARISON: CT-scan CAP 07/09/19  Enlargement of the thyroid, probable right lobe of thyroid nodularity unchanged  Prominent plaque thoracic aortic arch  Calcified granuloma right paratracheal region benign  Small to moderate paraesophageal type hernia suspected  Linear density involving left lobe pleural lung parenchyma unchanged, likely related to scarring  Focal area of low-density right lobe liver inferolaterally measures 7.8mm, unchanged  Mild to

## 2024-04-30 DIAGNOSIS — I10 PRIMARY HYPERTENSION: ICD-10-CM

## 2024-04-30 RX ORDER — LOSARTAN POTASSIUM 50 MG/1
TABLET ORAL
Qty: 90 TABLET | Refills: 1 | Status: SHIPPED | OUTPATIENT
Start: 2024-04-30

## 2024-04-30 NOTE — TELEPHONE ENCOUNTER
Keaton Wong is calling to request a refill on the following medication(s):    Medication Request:  Requested Prescriptions     Pending Prescriptions Disp Refills    losartan (COZAAR) 50 MG tablet [Pharmacy Med Name: LOSARTAN POTASSIUM 50 MG TAB] 90 tablet 1     Sig: TAKE ONE TABLET BY MOUTH EVERY MORNING AND AT BEDTIME       Last Visit Date (If Applicable):  2/23/2024    Next Visit Date:    8/23/2024

## 2024-05-24 RX ORDER — OMEPRAZOLE 20 MG/1
CAPSULE, DELAYED RELEASE ORAL
Qty: 180 CAPSULE | Refills: 0 | Status: SHIPPED | OUTPATIENT
Start: 2024-05-24

## 2024-05-24 NOTE — TELEPHONE ENCOUNTER
Keaton Wong is calling to request a refill on the following medication(s):    Medication Request:  Requested Prescriptions     Pending Prescriptions Disp Refills    omeprazole (PRILOSEC) 20 MG delayed release capsule [Pharmacy Med Name: OMEPRAZOLE DR 20 MG CAPSULE] 180 capsule 0     Sig: TAKE 1 CAPSULE BY MOUTH 2 TIMES A DAY BEFORE MEALS       Last Visit Date (If Applicable):  2/23/2024    Next Visit Date:    8/23/2024

## 2024-06-02 PROBLEM — Z23 NEED FOR SHINGLES VACCINE: Status: ACTIVE | Noted: 2024-06-02

## 2024-06-02 PROBLEM — E66.01 SEVERE OBESITY (BMI 35.0-39.9) WITH COMORBIDITY (HCC): Status: ACTIVE | Noted: 2024-06-02

## 2024-06-02 PROBLEM — R19.7 DIARRHEA: Status: ACTIVE | Noted: 2024-06-02

## 2024-06-02 PROBLEM — R10.9 LEFT SIDED ABDOMINAL PAIN: Status: ACTIVE | Noted: 2024-06-02

## 2024-06-02 PROBLEM — G89.3 CANCER RELATED PAIN: Status: ACTIVE | Noted: 2024-06-02

## 2024-06-02 PROBLEM — M85.859 OSTEOPENIA OF HIP: Status: ACTIVE | Noted: 2024-06-02

## 2024-06-10 NOTE — PROGRESS NOTES
recently weaning of the steroid treatment. She reports that she is being considered for an alternative treatment regimen at this time. She reports to have transferred her care to Dr. Benedict Bo in January 2024. Based on visit note by Dr. Bo on 01/10/24, she is being considered for Trastuzumab deruxtecan next due to ERBB2 amplification on NGS testing from [employer] 360 panel (10/13/23, based on specimen from 10/19/16).    At this point, the patient reports for the diarrhea to have essentially resolved. She reports to tolerate a regular diet, without symptoms of nausea, anorexia, abdominal bloating or early satiety.    She reports for the non-productive cough to have resolved.    She did not mention the flank pain today. She reports to have symptoms of abdominal pain.    She reports to experience symptoms of neuropathy in bilateral hands (greater on the left hand and left foot), treated with Neurontin, with adequate relief of her symptoms     She denies to have any symptoms of fatigue. She denies to have any fevers or chills. She denies to have any symptoms of vaginal bleeding or discharge.     TREATMENT SUMMARY:  THESE RECORDS HAVE BEEN REVIEWED, UNLESS OTHERWISE INDICATED      Recurrent and Progressive Stage IA, grade 3, Mixed carcinoma of the UTERUS (Diagnosed 2016)  The patient reports to have experienced symptoms of post-menopausal bleeding     DIAGNOSIS ESTABLISHED:   Endometrial Biopsy (09/12/16), by Dr. Ben Mishra: Grade 3, Mixed carcinoma (60% Serous and 40% Clear Cell). ER positive. MA positive.      DIAGNOSTIC STUDIES:  IMAGING EVALUATION:  Pelvic Ultrasound (08/17/16). Transabdominal and Transvaginal Views  7.8x3.2x5.3cm uterus, with 2.5cm endometrial stripe  2.4x1.5x1.4cm RIGHT ovary  1.7x1.5x1.5cm LEFT ovary  No significant free fluid is seen within the pelvis  CT-scan CAP (09/30/16)  No evidence of metastatic disease in the chest, abdomen or pelvis.    There was a nonspecific <4mm left lower

## 2024-06-13 ENCOUNTER — OFFICE VISIT (OUTPATIENT)
Dept: GYNECOLOGIC ONCOLOGY | Age: 69
End: 2024-06-13
Payer: MEDICARE

## 2024-06-13 VITALS
HEART RATE: 83 BPM | DIASTOLIC BLOOD PRESSURE: 62 MMHG | BODY MASS INDEX: 33.04 KG/M2 | WEIGHT: 172 LBS | OXYGEN SATURATION: 99 % | TEMPERATURE: 98.1 F | SYSTOLIC BLOOD PRESSURE: 118 MMHG

## 2024-06-13 DIAGNOSIS — C79.9 METASTASIS FROM CANCER OF UTERUS (HCC): Primary | ICD-10-CM

## 2024-06-13 DIAGNOSIS — C55 METASTASIS FROM CANCER OF UTERUS (HCC): Primary | ICD-10-CM

## 2024-06-13 PROCEDURE — 1123F ACP DISCUSS/DSCN MKR DOCD: CPT | Performed by: NURSE PRACTITIONER

## 2024-06-13 PROCEDURE — 99213 OFFICE O/P EST LOW 20 MIN: CPT | Performed by: NURSE PRACTITIONER

## 2024-06-13 PROCEDURE — 3078F DIAST BP <80 MM HG: CPT | Performed by: NURSE PRACTITIONER

## 2024-06-13 PROCEDURE — G8399 PT W/DXA RESULTS DOCUMENT: HCPCS | Performed by: NURSE PRACTITIONER

## 2024-06-13 PROCEDURE — 1036F TOBACCO NON-USER: CPT | Performed by: NURSE PRACTITIONER

## 2024-06-13 PROCEDURE — G8427 DOCREV CUR MEDS BY ELIG CLIN: HCPCS | Performed by: NURSE PRACTITIONER

## 2024-06-13 PROCEDURE — 3074F SYST BP LT 130 MM HG: CPT | Performed by: NURSE PRACTITIONER

## 2024-06-13 PROCEDURE — 1090F PRES/ABSN URINE INCON ASSESS: CPT | Performed by: NURSE PRACTITIONER

## 2024-06-13 PROCEDURE — G8417 CALC BMI ABV UP PARAM F/U: HCPCS | Performed by: NURSE PRACTITIONER

## 2024-06-13 PROCEDURE — 3017F COLORECTAL CA SCREEN DOC REV: CPT | Performed by: NURSE PRACTITIONER

## 2024-06-13 ASSESSMENT — ENCOUNTER SYMPTOMS
SCLERAL ICTERUS: 0
BACK PAIN: 0
SHORTNESS OF BREATH: 0
SORE THROAT: 0
DIARRHEA: 0
VOMITING: 0
HEMOPTYSIS: 0
CONSTIPATION: 1
RECTAL PAIN: 0
ABDOMINAL PAIN: 0
ABDOMINAL DISTENTION: 0
VOICE CHANGE: 0
NAUSEA: 0
COUGH: 0
BLOOD IN STOOL: 1
WHEEZING: 0
EYE PROBLEMS: 0
TROUBLE SWALLOWING: 0
CHEST TIGHTNESS: 0

## 2024-06-14 NOTE — PROGRESS NOTES
Review of Systems   Constitutional:  Negative for appetite change, chills, diaphoresis, fatigue, fever and unexpected weight change.   HENT:   Positive for hearing loss (right ear issues-seeing ENT). Negative for lump/mass, mouth sores, nosebleeds, sore throat, tinnitus, trouble swallowing and voice change.    Eyes:  Negative for eye problems and icterus.   Respiratory:  Negative for chest tightness, cough, hemoptysis, shortness of breath and wheezing.    Cardiovascular:  Positive for leg swelling (ankles occasionally). Negative for chest pain and palpitations.   Gastrointestinal:  Positive for blood in stool (bright red blood with wiping after BM's sometimes - hx of hemorrhoids) and constipation. Negative for abdominal distention, abdominal pain, diarrhea, nausea, rectal pain and vomiting.   Endocrine: Negative for hot flashes.   Genitourinary:  Negative for bladder incontinence, difficulty urinating, dyspareunia, dysuria, frequency, hematuria, menstrual problem, nocturia, pelvic pain, vaginal bleeding and vaginal discharge.    Musculoskeletal:  Negative for arthralgias, back pain, flank pain, gait problem, myalgias, neck pain and neck stiffness.   Skin:  Negative for itching, rash and wound.   Neurological:  Positive for numbness (chronic numbness in fingers and neuropathy in feet sometimes). Negative for dizziness, extremity weakness, gait problem, headaches, light-headedness, seizures and speech difficulty.   Hematological:  Negative for adenopathy. Does not bruise/bleed easily.   Psychiatric/Behavioral:  Negative for confusion, decreased concentration, depression, sleep disturbance and suicidal ideas. The patient is not nervous/anxious.        
auscultate bilaterally to the bases    No CVA tenderness present bilaterally.    Heart: Auscultation reveals regular rate and rhythm. no murmurs or gallops appreciated.    Abdomen: soft, NT. No detectable organomegaly. No palpable masses or ascites. No inguinal lymphadenopathy bilaterally.    Extremities: No edema, calf tenderness or deformities bilaterally    Pelvic: The patient has normal female external genitalia including, vulva, urethra, vagina, perineum, Bartholin glands. Uterus, bilateral fallopian tubes and adnexae, and cervix are  surgically absent. Speculum examination reveals no blood or discharge in vaginal vault. Vaginal cuff well-intact with no signs of erythema, induration, separation, or granulation tissue.     Bimanual examination: Bladder is non-tender, without mass. There are no palpable vaginal nodules and no other pelvic masses, tenderness or pathology noted.         Assessment   1.Cancer Staging   Endometrial cancer determined by uterine biopsy ([facility])  Staging form: Corpus Uteri - Carcinoma, AJCC 7th Edition  - Clinical stage from 10/22/2016: FIGO Stage IA (T1a, N0, M0) - Signed by Abimbola Gonzalez MD on 10/22/2016  Staging comments: 90% serous, 10% clear cell carcinoma    2 Impression: no clinical evidence of recurrence or metastasis  3. osteopenia      PLAN:  Her Surveillance plan is as follows:    1.While on immunotherpay/chemo, then RTO every 3 months.  2. Dr. Bo orders her CA-125 and PET/CT scans  3. Get her mammogram results from [facility]  4. Get her May, 2024 PET/CT scan results.    I spent 35 minutes providing care to the patient, counseling and coordinating care, discussing the patient's current situation, reviewing her options, counseling and education her and answering her questions.  The patient's pertinent images, labs, and previous records and procedures were reviewed.    Electronically signed by GARTH Murray CNP on 6/14/24 at 12:18 PM EDT

## 2024-07-15 DIAGNOSIS — I10 PRIMARY HYPERTENSION: ICD-10-CM

## 2024-07-16 RX ORDER — AMLODIPINE BESYLATE 5 MG/1
5 TABLET ORAL DAILY
Qty: 90 TABLET | Refills: 0 | Status: SHIPPED | OUTPATIENT
Start: 2024-07-16

## 2024-07-23 ENCOUNTER — HOSPITAL ENCOUNTER (OUTPATIENT)
Dept: MAMMOGRAPHY | Age: 69
Discharge: HOME | End: 2024-07-25
Payer: MEDICARE

## 2024-07-23 VITALS — BODY MASS INDEX: 33.77 KG/M2 | WEIGHT: 172 LBS | HEIGHT: 60 IN

## 2024-07-23 DIAGNOSIS — Z12.31 VISIT FOR SCREENING MAMMOGRAM: ICD-10-CM

## 2024-07-23 PROCEDURE — 77063 BREAST TOMOSYNTHESIS BI: CPT

## 2024-08-26 ENCOUNTER — OFFICE VISIT (OUTPATIENT)
Dept: FAMILY MEDICINE CLINIC | Age: 69
End: 2024-08-26
Payer: MEDICARE

## 2024-08-26 VITALS
DIASTOLIC BLOOD PRESSURE: 74 MMHG | WEIGHT: 168.8 LBS | SYSTOLIC BLOOD PRESSURE: 128 MMHG | TEMPERATURE: 98 F | HEIGHT: 60 IN | BODY MASS INDEX: 33.14 KG/M2

## 2024-08-26 DIAGNOSIS — I10 PRIMARY HYPERTENSION: Primary | ICD-10-CM

## 2024-08-26 DIAGNOSIS — E05.90 HYPERTHYROIDISM: ICD-10-CM

## 2024-08-26 DIAGNOSIS — C55 METASTASIS FROM CANCER OF UTERUS (HCC): ICD-10-CM

## 2024-08-26 DIAGNOSIS — G62.0 DRUG-INDUCED POLYNEUROPATHY (HCC): ICD-10-CM

## 2024-08-26 DIAGNOSIS — C79.9 METASTASIS FROM CANCER OF UTERUS (HCC): ICD-10-CM

## 2024-08-26 PROCEDURE — 1036F TOBACCO NON-USER: CPT

## 2024-08-26 PROCEDURE — 3078F DIAST BP <80 MM HG: CPT

## 2024-08-26 PROCEDURE — G8399 PT W/DXA RESULTS DOCUMENT: HCPCS

## 2024-08-26 PROCEDURE — 1123F ACP DISCUSS/DSCN MKR DOCD: CPT

## 2024-08-26 PROCEDURE — G8427 DOCREV CUR MEDS BY ELIG CLIN: HCPCS

## 2024-08-26 PROCEDURE — 1090F PRES/ABSN URINE INCON ASSESS: CPT

## 2024-08-26 PROCEDURE — 3074F SYST BP LT 130 MM HG: CPT

## 2024-08-26 PROCEDURE — 99214 OFFICE O/P EST MOD 30 MIN: CPT

## 2024-08-26 PROCEDURE — G8417 CALC BMI ABV UP PARAM F/U: HCPCS

## 2024-08-26 PROCEDURE — 3017F COLORECTAL CA SCREEN DOC REV: CPT

## 2024-08-26 ASSESSMENT — PATIENT HEALTH QUESTIONNAIRE - PHQ9
1. LITTLE INTEREST OR PLEASURE IN DOING THINGS: NOT AT ALL
SUM OF ALL RESPONSES TO PHQ QUESTIONS 1-9: 0
SUM OF ALL RESPONSES TO PHQ QUESTIONS 1-9: 0
SUM OF ALL RESPONSES TO PHQ9 QUESTIONS 1 & 2: 0
2. FEELING DOWN, DEPRESSED OR HOPELESS: NOT AT ALL
SUM OF ALL RESPONSES TO PHQ QUESTIONS 1-9: 0
SUM OF ALL RESPONSES TO PHQ QUESTIONS 1-9: 0

## 2024-08-26 NOTE — PROGRESS NOTES
Keaton Wong (:  1955) is a 69 y.o. female,Established patient, here for evaluation of the following chief complaint(s):  6 Month Follow-Up and Follow-up Chronic Condition          Subjective   SUBJECTIVE/OBJECTIVE:  Pt here today for chronic condition f/u  VSS    No longer requiring medications for BP or thyroid dysfunction  These have both normalized w/ change in chemo  CA is continuing to trend down and stay low  She is feeling great, continues to work and remain active        Review of Systems       Objective   Physical Exam  Vitals and nursing note reviewed.   Constitutional:       General: She is not in acute distress.     Appearance: Normal appearance. She is not ill-appearing, toxic-appearing or diaphoretic.   Cardiovascular:      Rate and Rhythm: Normal rate and regular rhythm.      Pulses: Normal pulses.      Heart sounds: Normal heart sounds.   Pulmonary:      Effort: Pulmonary effort is normal.      Breath sounds: Normal breath sounds.   Neurological:      Mental Status: She is alert.              Assessment & Plan   ASSESSMENT/PLAN:  1. Primary hypertension  -stable off medications    2. Hyperthyroidism  -no longer requiring medication, not following w/ endo    3. Metastasis from cancer of uterus (HCC)  4. Drug-induced polyneuropathy (HCC)  -stable, following w/ oncology       Return in about 6 months (around 2025), or if symptoms worsen or fail to improve, for MARTI arellano/ DRU or Jerome.               An electronic signature was used to authenticate this note.    --GARTH Melissa - CNP

## 2024-09-05 NOTE — TELEPHONE ENCOUNTER
Keaton Wong is calling to request a refill on the following medication(s):    Last Visit Date (If Applicable):  8/26/2024    Next Visit Date:    Visit date not found    Medication Request:  Requested Prescriptions     Pending Prescriptions Disp Refills    omeprazole (PRILOSEC) 20 MG delayed release capsule [Pharmacy Med Name: OMEPRAZOLE DR 20 MG CAPSULE] 180 capsule 0     Sig: TAKE 1 CAPSULE BY MOUTH 2 TIMES A DAY BEFORE MEALS

## 2024-09-18 LAB
ALBUMIN: 3.5 G/DL
ALK PHOSPHATASE: 58 U/L
ALT SERPL-CCNC: 13 U/L
AST SERPL-CCNC: 24 U/L
BASOPHILS RELATIVE PERCENT: 0.03 X10^3UL
BILIRUB SERPL-MCNC: 0.3 MG/DL
BUN BLDV-MCNC: 19 MG/DL
CA 125: 9.3 U/ML
CALCIUM SERPL-MCNC: 8.8 MG/DL
CHLORIDE BLD-SCNC: 107 MMOL/L
CO2: 23 MMOL/L
CREAT SERPL-MCNC: 0.69 MG/DL
EOSINOPHIL # BLD: 0.12 X10^3UL
ERYTHROCYTE [DISTWIDTH] IN BLOOD BY AUTOMATED COUNT: 59.2 FL
GFR AFRICAN AMERICAN: 101.5 ML/M1.7
GFR NON-AFRICAN AMERICAN: 83.9 ML/M1.7
GLUCOSE: 116 MG/DL
HCT VFR BLD CALC: 33.7 %
HEMOGLOBIN: 10.4 G/DL
IMMATURE GRANULOCYTES %: 0.01 X10^3UL
LYMPHOCYTES # BLD: 1.56 X10^3UL
MCH RBC QN AUTO: 30.1 PG
MCHC RBC AUTO-ENTMCNC: 30.9 G/DL
MCV RBC AUTO: 97.7 FL
MONOCYTES RELATIVE PERCENT: 0.73 X10^3UL
NEUTROPHILS: 2.88 X10^3UL
NUCLEATED RED BLOOD CELLS: 0
PLATELET # BLD: 261 X10^3UL
POTASSIUM SERPL-SCNC: 3.9 MMOL/L
RBC # BLD: 3.45 X10^6UL
SODIUM BLD-SCNC: 140 MMOL/L
TOTAL PROTEIN: 5.7 G/DL
WBC # BLD: 5.33 X10^3UL

## 2024-10-02 DIAGNOSIS — E78.00 PURE HYPERCHOLESTEROLEMIA: ICD-10-CM

## 2024-10-02 RX ORDER — ATORVASTATIN CALCIUM 10 MG/1
10 TABLET, FILM COATED ORAL DAILY
Qty: 90 TABLET | Refills: 1 | Status: SHIPPED | OUTPATIENT
Start: 2024-10-02

## 2024-10-02 NOTE — TELEPHONE ENCOUNTER
Keaton Wong is calling to request a refill on the following medication(s):    Medication Request:  Requested Prescriptions     Pending Prescriptions Disp Refills    atorvastatin (LIPITOR) 10 MG tablet [Pharmacy Med Name: ATORVASTATIN 10 MG TABLET] 90 tablet 1     Sig: TAKE 1 TABLET BY MOUTH DAILY       Last Visit Date (If Applicable):  8/26/2024    Next Visit Date:    02/28/2025

## 2024-10-07 ENCOUNTER — OFFICE VISIT (OUTPATIENT)
Dept: GYNECOLOGIC ONCOLOGY | Age: 69
End: 2024-10-07
Payer: MEDICARE

## 2024-10-07 VITALS
OXYGEN SATURATION: 100 % | BODY MASS INDEX: 32.07 KG/M2 | DIASTOLIC BLOOD PRESSURE: 81 MMHG | SYSTOLIC BLOOD PRESSURE: 127 MMHG | TEMPERATURE: 97.9 F | WEIGHT: 164.2 LBS | HEART RATE: 85 BPM

## 2024-10-07 DIAGNOSIS — R19.7 DIARRHEA, UNSPECIFIED TYPE: ICD-10-CM

## 2024-10-07 DIAGNOSIS — G89.3 CANCER RELATED PAIN: ICD-10-CM

## 2024-10-07 DIAGNOSIS — C79.9 METASTASIS FROM CANCER OF UTERUS (HCC): Primary | ICD-10-CM

## 2024-10-07 DIAGNOSIS — E03.2 HYPOTHYROIDISM DUE TO MEDICATION: ICD-10-CM

## 2024-10-07 DIAGNOSIS — Z23 NEED FOR SHINGLES VACCINE: ICD-10-CM

## 2024-10-07 DIAGNOSIS — E66.811 OBESITY, CLASS I, BMI 30-34.9: ICD-10-CM

## 2024-10-07 DIAGNOSIS — C55 METASTASIS FROM CANCER OF UTERUS (HCC): Primary | ICD-10-CM

## 2024-10-07 PROCEDURE — G8427 DOCREV CUR MEDS BY ELIG CLIN: HCPCS | Performed by: OBSTETRICS & GYNECOLOGY

## 2024-10-07 PROCEDURE — 1123F ACP DISCUSS/DSCN MKR DOCD: CPT | Performed by: OBSTETRICS & GYNECOLOGY

## 2024-10-07 PROCEDURE — G8484 FLU IMMUNIZE NO ADMIN: HCPCS | Performed by: OBSTETRICS & GYNECOLOGY

## 2024-10-07 PROCEDURE — 3079F DIAST BP 80-89 MM HG: CPT | Performed by: OBSTETRICS & GYNECOLOGY

## 2024-10-07 PROCEDURE — 99215 OFFICE O/P EST HI 40 MIN: CPT | Performed by: OBSTETRICS & GYNECOLOGY

## 2024-10-07 PROCEDURE — G8399 PT W/DXA RESULTS DOCUMENT: HCPCS | Performed by: OBSTETRICS & GYNECOLOGY

## 2024-10-07 PROCEDURE — 3017F COLORECTAL CA SCREEN DOC REV: CPT | Performed by: OBSTETRICS & GYNECOLOGY

## 2024-10-07 PROCEDURE — G8417 CALC BMI ABV UP PARAM F/U: HCPCS | Performed by: OBSTETRICS & GYNECOLOGY

## 2024-10-07 PROCEDURE — 1090F PRES/ABSN URINE INCON ASSESS: CPT | Performed by: OBSTETRICS & GYNECOLOGY

## 2024-10-07 PROCEDURE — 1036F TOBACCO NON-USER: CPT | Performed by: OBSTETRICS & GYNECOLOGY

## 2024-10-07 PROCEDURE — 3074F SYST BP LT 130 MM HG: CPT | Performed by: OBSTETRICS & GYNECOLOGY

## 2024-10-07 ASSESSMENT — ENCOUNTER SYMPTOMS
HEMOPTYSIS: 0
SCLERAL ICTERUS: 0
VOMITING: 0
BLOOD IN STOOL: 0
NAUSEA: 0
BACK PAIN: 1
TROUBLE SWALLOWING: 0
SHORTNESS OF BREATH: 0
WHEEZING: 0
COUGH: 0
CONSTIPATION: 0
DIARRHEA: 1
VOICE CHANGE: 0
SORE THROAT: 0
ABDOMINAL PAIN: 0
ABDOMINAL DISTENTION: 0
CHEST TIGHTNESS: 0
EYE PROBLEMS: 0
RECTAL PAIN: 0

## 2024-10-07 NOTE — PROGRESS NOTES
Review of Systems   Constitutional:  Negative for appetite change, chills, diaphoresis, fatigue, fever and unexpected weight change.   HENT:   Positive for hearing loss (Right ear hearing loss, seeing ENT). Negative for lump/mass, mouth sores, nosebleeds, sore throat, tinnitus, trouble swallowing and voice change.    Eyes:  Negative for eye problems and icterus.   Respiratory:  Negative for chest tightness, cough, hemoptysis, shortness of breath and wheezing.    Cardiovascular:  Positive for leg swelling (bilat ankles swell occasionally). Negative for chest pain and palpitations.   Gastrointestinal:  Positive for diarrhea (loose but not watery). Negative for abdominal distention, abdominal pain, blood in stool, constipation, nausea, rectal pain and vomiting.   Endocrine: Negative for hot flashes.   Genitourinary:  Negative for bladder incontinence, difficulty urinating, dyspareunia, dysuria, frequency, hematuria, menstrual problem, nocturia, pelvic pain, vaginal bleeding and vaginal discharge.    Musculoskeletal:  Positive for back pain. Negative for arthralgias, flank pain, gait problem, myalgias, neck pain and neck stiffness.        RLE sciatica - seeing chiropractor   Skin:  Negative for itching, rash and wound.   Neurological:  Positive for numbness (neuropathy in fingers and rarely in left foot). Negative for dizziness, extremity weakness, gait problem, headaches, light-headedness, seizures and speech difficulty.   Hematological:  Negative for adenopathy. Bruises/bleeds easily (Bruises easily).   Psychiatric/Behavioral:  Negative for confusion, decreased concentration, depression, sleep disturbance and suicidal ideas. The patient is not nervous/anxious.        
is recommended.  Thyroid Biopsy (11/30/23): Benign follicular nodule  Keytruda discontinued after 11/03/23 due to autoimmune hepatitis, hyperthyroidism, skin rash and diarrhea  She was treated on high dose Prednisone after discontinuation of the Keytruda and Lenvema. She is weaning off the Prednisone now  Trastuzumab Deruxtecan (C3: 03/15/24. C4: 04/05/24). C1 and C2 details are unavailable for review    [employer] 360 Tissue next panel (10/13/23) (from specimen 10/19/16)  ERBB2 amplification  [id number]  TP53 [id number]  TMB stable = 3m/MB  MSI-High NOT detected  PD-L1 <1  COMMENT: An extended duration of time between the original specimen date of collection and performance of the TissueNext test is noted. If there has been interval treatment between these two time points, the results of this assay may not reflect the current biology of the tumor     [facility] (01/06/24) (specimen from 09/12/23)  Tumor Proportion Score, based PD-L1 IHC (Dako 22C3 pharmDx): 1%     [facility] (01/06/24) (specimen from 09/12/23)  Biomarker Findings  TMB stable = 4m/MB  MSI-High NOT detected  Genomic Findings (Panel of 324 genes tested)  [id number]  PTEN loss  [id number]   Diagnostic Claims: No reportable alterations were detected     !! NEED GENETIC TESTING RESULTS !!     3.   Cervix Dysplasia (Diagnosed 2016)  DIAGNOSIS ESTABLISHED:  Pap test (02/11/14): NILM. Endocervix component is present. HPV either not done or not available for review  Pap test (03/29/16): LSIL. Endocervix component is absent. HPV either not done or not available for review  Cervix pathology benign on hysterectomy specimen (10/19/16). No further pap testing indicated.     HEALTH MAINTENANCE:      See TREATMENT SUMMARY for details of cervix dysplasia history   Mammogram (07/13/23): BR1. Records have been reviewed.  Colorectal Cancer Screening (April 2021), by Dr. Theodore Castillo: Hyperplastic rectal polyp, Mild sigmoid diverticulosis, Internal

## 2024-10-09 LAB
ALBUMIN: 3.5 G/DL
ALK PHOSPHATASE: 51 U/L
ALT SERPL-CCNC: 10 U/L
AST SERPL-CCNC: 24 U/L
BASOPHILS RELATIVE PERCENT: 0.06 X10^3UL
BILIRUB SERPL-MCNC: 0.3 MG/DL
BUN BLDV-MCNC: 26 MG/DL
CALCIUM SERPL-MCNC: 9 MG/DL
CHLORIDE BLD-SCNC: 108 MMOL/L
CO2: 25 MMOL/L
CREAT SERPL-MCNC: 0.87 MG/DL
EOSINOPHIL # BLD: 0.11 X10^3UL
ERYTHROCYTE [DISTWIDTH] IN BLOOD BY AUTOMATED COUNT: 63.5 FL
GFR AFRICAN AMERICAN: 77.7 ML/M1.7
GFR NON-AFRICAN AMERICAN: 64.2 ML/M1.7
GLUCOSE: 122 MG/DL
HCT VFR BLD CALC: 34.5 %
HEMOGLOBIN: 10.6 G/DL
IMMATURE GRANULOCYTES %: 0.02 X10^3UL
LYMPHOCYTES # BLD: 1.48 X10^3UL
MCH RBC QN AUTO: 30.4 PG
MCHC RBC AUTO-ENTMCNC: 30.7 G/DL
MCV RBC AUTO: 98.9 FL
MONOCYTES RELATIVE PERCENT: 0.76 X10^3UL
NEUTROPHILS: 2.45 X10^3UL
NUCLEATED RED BLOOD CELLS: 0
PLATELET # BLD: 346 X10^3UL
POTASSIUM SERPL-SCNC: 4.3 MMOL/L
RBC # BLD: 3.49 X10^6UL
SODIUM BLD-SCNC: 140 MMOL/L
TOTAL PROTEIN: 5.9 G/DL
WBC # BLD: 4.88 X10^3UL

## 2024-10-30 LAB
ALBUMIN: 3.7 G/DL
ALK PHOSPHATASE: 59 U/L
ALT SERPL-CCNC: 10 U/L
AST SERPL-CCNC: 28 U/L
BASOPHILS RELATIVE PERCENT: 0.05 X10^3UL
BILIRUB SERPL-MCNC: 0.3 MG/DL
BUN BLDV-MCNC: 20 MG/DL
CA 125: 17.3 U/ML
CALCIUM SERPL-MCNC: 8.9 MG/DL
CHLORIDE BLD-SCNC: 107 MMOL/L
CO2: 27 MMOL/L
CREAT SERPL-MCNC: 0.83 MG/DL
EOSINOPHIL # BLD: 0.1 X10^3UL
ERYTHROCYTE [DISTWIDTH] IN BLOOD BY AUTOMATED COUNT: 68.5 FL
GFR AFRICAN AMERICAN: 82 ML/M1.7
GFR NON-AFRICAN AMERICAN: 67.8 ML/M1.7
GLUCOSE: 105 MG/DL
HCT VFR BLD CALC: 34 %
HEMOGLOBIN: 10.4 G/DL
IMMATURE GRANULOCYTES %: 0 X10^3UL
LYMPHOCYTES # BLD: 1.53 X10^3UL
MCH RBC QN AUTO: 30.4 PG
MCHC RBC AUTO-ENTMCNC: 30.6 G/DL
MCV RBC AUTO: 99.4 FL
MONOCYTES RELATIVE PERCENT: 0.77 X10^3UL
NEUTROPHILS: 2.72 X10^3UL
NUCLEATED RED BLOOD CELLS: 0
PLATELET # BLD: 349 X10^3UL
POTASSIUM SERPL-SCNC: 4.1 MMOL/L
RBC # BLD: 3.42 X10^6UL
SODIUM BLD-SCNC: 140 MMOL/L
TOTAL PROTEIN: 6.3 G/DL
WBC # BLD: 5.17 X10^3UL

## 2024-11-05 ENCOUNTER — APPOINTMENT (OUTPATIENT)
Dept: CT IMAGING | Age: 69
End: 2024-11-05
Payer: MEDICARE

## 2024-11-05 ENCOUNTER — HOSPITAL ENCOUNTER (EMERGENCY)
Age: 69
Discharge: HOME | End: 2024-11-05
Payer: MEDICARE

## 2024-11-05 VITALS
HEART RATE: 86 BPM | BODY MASS INDEX: 30.96 KG/M2 | RESPIRATION RATE: 18 BRPM | SYSTOLIC BLOOD PRESSURE: 153 MMHG | DIASTOLIC BLOOD PRESSURE: 96 MMHG | TEMPERATURE: 98.2 F | WEIGHT: 164 LBS | OXYGEN SATURATION: 100 % | HEIGHT: 61 IN

## 2024-11-05 DIAGNOSIS — M54.50 LOW BACK PAIN, UNSPECIFIED BACK PAIN LATERALITY, UNSPECIFIED CHRONICITY, UNSPECIFIED WHETHER SCIATICA PRESENT: Primary | ICD-10-CM

## 2024-11-05 LAB
ALBUMIN SERPL-MCNC: 3.7 G/DL (ref 3.5–5.2)
ALP SERPL-CCNC: 36 U/L (ref 35–104)
ALT SERPL-CCNC: 11 U/L (ref 5–33)
ANION GAP SERPL CALCULATED.3IONS-SCNC: 13 MMOL/L
AST SERPL-CCNC: 24 U/L
BASOPHILS # BLD: <0.03 K/UL (ref 0–0.2)
BASOPHILS NFR BLD: 0 % (ref 0–2)
BILIRUB DIRECT SERPL-MCNC: 0.2 MG/DL
BILIRUB INDIRECT SERPL-MCNC: 0.2 MG/DL (ref 0–1)
BILIRUB SERPL-MCNC: 0.4 MG/DL (ref 0.3–1.2)
BILIRUB UR QL STRIP: NEGATIVE
BUN SERPL-MCNC: 22 MG/DL (ref 8–23)
BUN/CREAT SERPL: 28 (ref 9–20)
CALCIUM SERPL-MCNC: 9.2 MG/DL (ref 8.8–10.2)
CHLORIDE SERPL-SCNC: 103 MMOL/L (ref 98–107)
CLARITY UR: ABNORMAL
CO2 SERPL-SCNC: 26 MMOL/L (ref 20–31)
COLOR UR: YELLOW
CREAT SERPL-MCNC: 0.8 MG/DL (ref 0.5–0.9)
EOSINOPHIL # BLD: 0.1 K/UL (ref 0–0.44)
EOSINOPHILS RELATIVE PERCENT: 2 % (ref 1–4)
EPI CELLS #/AREA URNS HPF: NORMAL /HPF (ref 0–5)
ERYTHROCYTE [DISTWIDTH] IN BLOOD BY AUTOMATED COUNT: 18.5 % (ref 11.8–14.4)
GFR, ESTIMATED: 79 ML/MIN/1.73M2
GLUCOSE SERPL-MCNC: 80 MG/DL (ref 82–115)
GLUCOSE UR STRIP-MCNC: NEGATIVE MG/DL
HCT VFR BLD AUTO: 36.6 % (ref 36.3–47.1)
HGB BLD-MCNC: 11.4 G/DL (ref 11.9–15.1)
HGB UR QL STRIP.AUTO: NEGATIVE
IMM GRANULOCYTES # BLD AUTO: 0.01 K/UL (ref 0–0.3)
IMM GRANULOCYTES NFR BLD: 0 %
KETONES UR STRIP-MCNC: NEGATIVE MG/DL
LEUKOCYTE ESTERASE UR QL STRIP: ABNORMAL
LYMPHOCYTES NFR BLD: 1.88 K/UL (ref 1.1–3.7)
LYMPHOCYTES RELATIVE PERCENT: 32 % (ref 24–43)
MCH RBC QN AUTO: 31.1 PG (ref 25.2–33.5)
MCHC RBC AUTO-ENTMCNC: 31.1 G/DL (ref 28.4–34.8)
MCV RBC AUTO: 99.7 FL (ref 82.6–102.9)
MONOCYTES NFR BLD: 0.62 K/UL (ref 0.1–1.2)
MONOCYTES NFR BLD: 11 % (ref 3–12)
NEUTROPHILS NFR BLD: 55 % (ref 36–65)
NEUTS SEG NFR BLD: 3.17 K/UL (ref 1.5–8.1)
NITRITE UR QL STRIP: NEGATIVE
NRBC BLD-RTO: 0 PER 100 WBC
PH UR STRIP: 5.5 [PH] (ref 5–8)
PLATELET # BLD AUTO: 324 K/UL (ref 138–453)
PMV BLD AUTO: 10.7 FL (ref 8.1–13.5)
POTASSIUM SERPL-SCNC: 3.9 MMOL/L (ref 3.7–5.3)
PROT SERPL-MCNC: 6.3 G/DL (ref 6.6–8.7)
PROT UR STRIP-MCNC: NEGATIVE MG/DL
RBC # BLD AUTO: 3.67 M/UL (ref 3.95–5.11)
RBC # BLD: ABNORMAL 10*6/UL
RBC #/AREA URNS HPF: NORMAL /HPF (ref 0–2)
SODIUM SERPL-SCNC: 142 MMOL/L (ref 136–145)
SP GR UR STRIP: 1.04 (ref 1–1.03)
UROBILINOGEN UR STRIP-ACNC: NORMAL EU/DL (ref 0–1)
WBC #/AREA URNS HPF: NORMAL /HPF (ref 0–5)
WBC OTHER # BLD: 5.8 K/UL (ref 3.5–11.3)

## 2024-11-05 PROCEDURE — 81001 URINALYSIS AUTO W/SCOPE: CPT

## 2024-11-05 PROCEDURE — 6360000004 HC RX CONTRAST MEDICATION

## 2024-11-05 PROCEDURE — 6370000000 HC RX 637 (ALT 250 FOR IP)

## 2024-11-05 PROCEDURE — 96374 THER/PROPH/DIAG INJ IV PUSH: CPT

## 2024-11-05 PROCEDURE — 80048 BASIC METABOLIC PNL TOTAL CA: CPT

## 2024-11-05 PROCEDURE — 85025 COMPLETE CBC W/AUTO DIFF WBC: CPT

## 2024-11-05 PROCEDURE — 80076 HEPATIC FUNCTION PANEL: CPT

## 2024-11-05 PROCEDURE — 74177 CT ABD & PELVIS W/CONTRAST: CPT

## 2024-11-05 PROCEDURE — 6360000002 HC RX W HCPCS

## 2024-11-05 PROCEDURE — 99285 EMERGENCY DEPT VISIT HI MDM: CPT

## 2024-11-05 PROCEDURE — 72131 CT LUMBAR SPINE W/O DYE: CPT

## 2024-11-05 PROCEDURE — 2580000003 HC RX 258

## 2024-11-05 RX ORDER — METHYLPREDNISOLONE 4 MG/1
TABLET ORAL
Qty: 1 KIT | Refills: 0 | Status: SHIPPED | OUTPATIENT
Start: 2024-11-05

## 2024-11-05 RX ORDER — IOPAMIDOL 755 MG/ML
75 INJECTION, SOLUTION INTRAVASCULAR
Status: COMPLETED | OUTPATIENT
Start: 2024-11-05 | End: 2024-11-05

## 2024-11-05 RX ORDER — SODIUM CHLORIDE 0.9 % (FLUSH) 0.9 %
10 SYRINGE (ML) INJECTION ONCE
Status: COMPLETED | OUTPATIENT
Start: 2024-11-05 | End: 2024-11-05

## 2024-11-05 RX ORDER — LIDOCAINE 4 G/G
1 PATCH TOPICAL ONCE
Status: DISCONTINUED | OUTPATIENT
Start: 2024-11-05 | End: 2024-11-05 | Stop reason: HOSPADM

## 2024-11-05 RX ORDER — LIDOCAINE 50 MG/G
1 PATCH TOPICAL DAILY
Qty: 10 PATCH | Refills: 0 | Status: SHIPPED | OUTPATIENT
Start: 2024-11-05 | End: 2024-11-15

## 2024-11-05 RX ORDER — MORPHINE SULFATE 4 MG/ML
4 INJECTION, SOLUTION INTRAMUSCULAR; INTRAVENOUS ONCE
Status: COMPLETED | OUTPATIENT
Start: 2024-11-05 | End: 2024-11-05

## 2024-11-05 RX ORDER — 0.9 % SODIUM CHLORIDE 0.9 %
80 INTRAVENOUS SOLUTION INTRAVENOUS ONCE
Status: COMPLETED | OUTPATIENT
Start: 2024-11-05 | End: 2024-11-05

## 2024-11-05 RX ADMIN — MORPHINE SULFATE 4 MG: 4 INJECTION, SOLUTION INTRAMUSCULAR; INTRAVENOUS at 15:15

## 2024-11-05 RX ADMIN — IOPAMIDOL 75 ML: 755 INJECTION, SOLUTION INTRAVENOUS at 16:12

## 2024-11-05 RX ADMIN — SODIUM CHLORIDE 80 ML: 9 INJECTION, SOLUTION INTRAVENOUS at 16:20

## 2024-11-05 RX ADMIN — SODIUM CHLORIDE, PRESERVATIVE FREE 10 ML: 5 INJECTION INTRAVENOUS at 16:20

## 2024-11-05 ASSESSMENT — ENCOUNTER SYMPTOMS
NAUSEA: 0
ABDOMINAL PAIN: 0
COLOR CHANGE: 0
SHORTNESS OF BREATH: 0
VOMITING: 0
CONSTIPATION: 1
BACK PAIN: 1

## 2024-11-05 ASSESSMENT — PAIN DESCRIPTION - ORIENTATION
ORIENTATION: LOWER
ORIENTATION: RIGHT;LEFT;MID

## 2024-11-05 ASSESSMENT — PAIN DESCRIPTION - LOCATION
LOCATION: BACK
LOCATION: BACK

## 2024-11-05 ASSESSMENT — PAIN SCALES - GENERAL
PAINLEVEL_OUTOF10: 8
PAINLEVEL_OUTOF10: 9

## 2024-11-05 ASSESSMENT — LIFESTYLE VARIABLES
HOW OFTEN DO YOU HAVE A DRINK CONTAINING ALCOHOL: NEVER
HOW MANY STANDARD DRINKS CONTAINING ALCOHOL DO YOU HAVE ON A TYPICAL DAY: PATIENT DOES NOT DRINK

## 2024-11-05 ASSESSMENT — PAIN - FUNCTIONAL ASSESSMENT: PAIN_FUNCTIONAL_ASSESSMENT: 0-10

## 2024-11-05 NOTE — ED PROVIDER NOTES
ADDENDUM:        Care of this patient was assumed from Dr. Mcneal    at    1730.  The patient was seen for Back Pain (Pt reports lower back pain intermittent x 1 month.  Pt reports that she is currently being treated for uterine cancer.  Pt states that she has followed up with her oncologist d/t this pain.  Pt states that she went to the [facility] for outpatient xray yesterday but they did not have the order.  Pt reports pain radiates down back of the right leg.  Pt reports that she has been having difficulty walking d/t pain.  Pt denies falls, known trauma.  Pt reports that she has been taking Norco x 3 days)  .  The patient's initial evaluation and plan have been discussed with the prior provider who initially evaluated the patient.  Nursing Notes, Past Medical Hx, Past Surgical Hx, Social Hx, Allergies, and Family Hx were all reviewed.      I performed a repeat evaluation of the patient and reviewed tests completed so far.    CT lumbar spine and abdomen pelvis concerning for lytic and sclerotic changes of T11 although this is less likely to be causing the patient's radicular pain.  Patient was given pain medication and is feeling better.  Patient has Norco at home.  Patient also given Medrol Dosepak and Lidoderm patch prescriptions.  Patient be discharged home and follow-up with PCP/oncology.  Results and plan discussed with patient and family who verbalized understanding and agreement.    ED Course     The patient was given the following medications:  Orders Placed This Encounter   Medications    morphine sulfate (PF) injection 4 mg    lidocaine 4 % external patch 1 patch    sodium chloride 0.9 % bolus 80 mL    iopamidol (ISOVUE-370) 76 % injection 75 mL    sodium chloride flush 0.9 % injection 10 mL    methylPREDNISolone (MEDROL, NOE,) 4 MG tablet     Sig: Take by mouth.     Dispense:  1 kit     Refill:  0    lidocaine (LIDODERM) 5 %     Sig: Place 1 patch onto the skin daily for 10 days 12 hours on, 12

## 2024-11-05 NOTE — ED PROVIDER NOTES
[facility] ED  Emergency Department Encounter  Emergency Medicine Attending     Pt Name:Keaton Wong  MRN: [id number]  Birthdate 1955  Date of evaluation: 11/5/24  PCP:  Verena Quispe APRN - CNP  Note Started: 2:31 PM EST      CHIEF COMPLAINT       Chief Complaint   Patient presents with    Back Pain     Pt reports lower back pain intermittent x 1 month.  Pt reports that she is currently being treated for uterine cancer.  Pt states that she has followed up with her oncologist d/t this pain.  Pt states that she went to the [facility] for outpatient xray yesterday but they did not have the order.  Pt reports pain radiates down back of the right leg.  Pt reports that she has been having difficulty walking d/t pain.  Pt denies falls, known trauma.  Pt reports that she has been taking Norco x 3 days       HISTORY OF PRESENT ILLNESS  (Location/Symptom, Timing/Onset, Context/Setting, Quality, Duration, Modifying Factors, Severity.)      69-year-old female presenting for evaluation of back pain.  Patient has history of uterine cancer and is currently on immunotherapy that she takes every 3 weeks.  She states for approximately the last 3 weeks she has been having worsening back pain that is located in her low back.  She does experience intermittent sciatica down her right leg.  Denies any trauma or injuries or falls.  She is having difficulty ambulating secondary to her hip pain.  No fevers or chills.  Denies any dysuria.  Denies any nausea or vomiting or abdominal pain.  Patient has been constipated over the last few days and has not experienced a bowel movement.  She typically takes Tylenol and gabapentin for pain but a few days ago she was advised by her physician to return to using ibuprofen but over the last day or so her pain became severe so she took Norco which did help her pain.  Denies any numbness.  Denies any saddle anesthesia.            PAST MEDICAL / SURGICAL / SOCIAL / FAMILY HISTORY      has a

## 2024-12-05 ENCOUNTER — TELEPHONE (OUTPATIENT)
Dept: FAMILY MEDICINE CLINIC | Age: 69
End: 2024-12-05

## 2025-01-31 ENCOUNTER — TELEPHONE (OUTPATIENT)
Dept: GYNECOLOGIC ONCOLOGY | Age: 70
End: 2025-01-31

## 2025-01-31 NOTE — TELEPHONE ENCOUNTER
Patient called in to notify office that she recently had a hip replacement surgery, so she wants to hold off on appointments.

## 2025-04-22 ENCOUNTER — HOSPITAL ENCOUNTER (EMERGENCY)
Age: 70
Discharge: HOME | End: 2025-04-22
Attending: EMERGENCY MEDICINE
Payer: MEDICARE

## 2025-04-22 ENCOUNTER — APPOINTMENT (OUTPATIENT)
Dept: GENERAL RADIOLOGY | Age: 70
End: 2025-04-22
Payer: MEDICARE

## 2025-04-22 VITALS
BODY MASS INDEX: 30.96 KG/M2 | DIASTOLIC BLOOD PRESSURE: 75 MMHG | RESPIRATION RATE: 16 BRPM | HEART RATE: 92 BPM | WEIGHT: 164 LBS | OXYGEN SATURATION: 98 % | TEMPERATURE: 97.4 F | HEIGHT: 61 IN | SYSTOLIC BLOOD PRESSURE: 106 MMHG

## 2025-04-22 DIAGNOSIS — K59.03 DRUG-INDUCED CONSTIPATION: Primary | ICD-10-CM

## 2025-04-22 PROCEDURE — 6370000000 HC RX 637 (ALT 250 FOR IP): Performed by: PHYSICIAN ASSISTANT

## 2025-04-22 PROCEDURE — 99283 EMERGENCY DEPT VISIT LOW MDM: CPT

## 2025-04-22 PROCEDURE — 74022 RADEX COMPL AQT ABD SERIES: CPT

## 2025-04-22 RX ADMIN — NALOXEGOL OXALATE 25 MG: 25 TABLET, FILM COATED ORAL at 15:28

## 2025-04-22 NOTE — ED PROVIDER NOTES
Count includes the [facility] EMERGENCY DEPARTMENT  eMERGENCY dEPARTMENT eNCOUnter      Pt Name: Keaton Wong  MRN: [id number]  Birthdate 1955  Date of evaluation: 4/22/2025  Provider: Joellen Steel PA-C    CHIEF COMPLAINT       Chief Complaint   Patient presents with    Constipation     Pt states its been a few days since last BM. Pt states she has tried laxatives and suppositories with no relief.      HISTORY OF PRESENT ILLNESS  (Location/Symptom, Timing/Onset, Context/Setting, Quality, Duration, Modifying Factors, Severity.)   Keaton Wong is a 70 y.o. female who presents to the emergency department.  Patient is dates that she recently has been developing worsening constipation.  She currently takes 2 senna at night and MiraLAX as needed.  She is currently receiving treatment for endometrial cancer and had an infusion last Friday.  She states that she believes her last bowel movement was prior to her infusion.  She is scheduled to start iron supplementation.  She denies any other new concerns or issues.  She states that she believes that she needs an enema.  She does not have abdominal pain just cramping.  Tried taking suppository at home with no relief    Nursing Notes were reviewed.    ALLERGIES     Patient has no known allergies.    CURRENT MEDICATIONS       Previous Medications    ACETAMINOPHEN (TYLENOL) 500 MG TABLET    Take 2 tablets by mouth 3 times daily    ATORVASTATIN (LIPITOR) 10 MG TABLET    TAKE 1 TABLET BY MOUTH DAILY    CHOLECALCIFEROL (VITAMIN D3) 50 MCG (2000 UT) CAPS    Take 1 capsule by mouth daily    FIBER COMPLETE PO    Take by mouth    FLUTICASONE (FLONASE) 50 MCG/ACT NASAL SPRAY    1 spray by Each Nostril route daily    GABAPENTIN (NEURONTIN) 100 MG CAPSULE    Take 1 capsule by mouth in the morning and at bedtime.    HYDROCODONE-ACETAMINOPHEN (NORCO) 5-325 MG PER TABLET    1 tablet daily as needed for Pain.    LORATADINE (CLARITIN) 10 MG TABLET    Take 1 tablet by mouth daily

## 2025-04-22 NOTE — ED NOTES
Pt states fleets enema helped a little and is requesting another. OK to give another fleets enema per verbal order from HUMBERTO RATLIFF. Another Fleets enema administered.

## 2025-04-22 NOTE — ED NOTES
Pt given milk of molasses enema with no success. HUMBERTO RATLIFF notified. Per HUMBERTO Steel give pt fleets enema. Fleets enema administered. Pt tolerated.

## 2025-04-22 NOTE — ED NOTES
Pt had some success with fleets enema. Pt requesting soap suds enema. Ok to give soap suds enema per verbal order from HUMBERTO RATLIFF. Soap suds enema administered.

## 2025-04-22 NOTE — DISCHARGE INSTRUCTIONS
Take medication as directed.  Continue to increase fluids and fiber in your diet.  Follow-up with your family doctor.  Please take medication every morning until you desire normal bowel movements.

## 2025-04-22 NOTE — ED PROVIDER NOTES
eMERGENCY dEPARTMENT  Attending Physician Attestation     Pt Name: Keaton Wong  MRN: [id number]  Birthdate 1955  Date of evaluation: 4/22/25     Keaton Wong is a 70 y.o. female with CC: Constipation (Pt states its been a few days since last BM. Pt states she has tried laxatives and suppositories with no relief. )      Based on the medical record the care appears appropriate.  I was personally available for consultation in the Emergency Department.    Isrrael Sargent DO  Attending Emergency Physician                  Isrrael Sargent DO  04/22/25 [id number]

## 2025-04-22 NOTE — ED NOTES
Pt to the ED with complaints of constipation. Pt is unsure of when she last had a BM but knows that she has not had one since her last infusion, on Friday. Pt has tried multiple things at home including suppositories, laxitives, and stool softeners without any relief. Pt has a hx of active cancer and has a history of constipation. Pt is in no distress at this time.

## 2025-05-05 ENCOUNTER — TELEPHONE (OUTPATIENT)
Dept: GYNECOLOGIC ONCOLOGY | Age: 70
End: 2025-05-05

## 2025-05-05 NOTE — TELEPHONE ENCOUNTER
Called med onc office and requested most recent progress notes.  Last office visit 4/25/25, notes to be faxed.

## 2025-05-07 ENCOUNTER — OFFICE VISIT (OUTPATIENT)
Dept: GYNECOLOGIC ONCOLOGY | Age: 70
End: 2025-05-07
Payer: MEDICARE

## 2025-05-07 VITALS
DIASTOLIC BLOOD PRESSURE: 71 MMHG | SYSTOLIC BLOOD PRESSURE: 135 MMHG | TEMPERATURE: 98.6 F | OXYGEN SATURATION: 100 % | WEIGHT: 145 LBS | HEART RATE: 78 BPM | BODY MASS INDEX: 27.4 KG/M2

## 2025-05-07 DIAGNOSIS — K59.00 CONSTIPATION, UNSPECIFIED CONSTIPATION TYPE: ICD-10-CM

## 2025-05-07 DIAGNOSIS — Z90.710 ACQUIRED ABSENCE OF BOTH CERVIX AND UTERUS: ICD-10-CM

## 2025-05-07 DIAGNOSIS — R63.4 WEIGHT LOSS: ICD-10-CM

## 2025-05-07 DIAGNOSIS — R19.7 DIARRHEA, UNSPECIFIED TYPE: ICD-10-CM

## 2025-05-07 DIAGNOSIS — Z12.31 ENCOUNTER FOR SCREENING MAMMOGRAM FOR MALIGNANT NEOPLASM OF BREAST: ICD-10-CM

## 2025-05-07 DIAGNOSIS — Z23 NEED FOR SHINGLES VACCINE: ICD-10-CM

## 2025-05-07 DIAGNOSIS — G47.9 SLEEP DISTURBANCE: ICD-10-CM

## 2025-05-07 DIAGNOSIS — C79.9 METASTASIS FROM CANCER OF UTERUS (HCC): Primary | ICD-10-CM

## 2025-05-07 DIAGNOSIS — C55 METASTASIS FROM CANCER OF UTERUS (HCC): Primary | ICD-10-CM

## 2025-05-07 DIAGNOSIS — Z90.722 ACQUIRED ABSENCE OF OVARIES, BILATERAL: ICD-10-CM

## 2025-05-07 PROCEDURE — G8399 PT W/DXA RESULTS DOCUMENT: HCPCS | Performed by: OBSTETRICS & GYNECOLOGY

## 2025-05-07 PROCEDURE — G8417 CALC BMI ABV UP PARAM F/U: HCPCS | Performed by: OBSTETRICS & GYNECOLOGY

## 2025-05-07 PROCEDURE — 1123F ACP DISCUSS/DSCN MKR DOCD: CPT | Performed by: OBSTETRICS & GYNECOLOGY

## 2025-05-07 PROCEDURE — 1125F AMNT PAIN NOTED PAIN PRSNT: CPT | Performed by: OBSTETRICS & GYNECOLOGY

## 2025-05-07 PROCEDURE — 99215 OFFICE O/P EST HI 40 MIN: CPT | Performed by: OBSTETRICS & GYNECOLOGY

## 2025-05-07 PROCEDURE — 1036F TOBACCO NON-USER: CPT | Performed by: OBSTETRICS & GYNECOLOGY

## 2025-05-07 PROCEDURE — 3075F SYST BP GE 130 - 139MM HG: CPT | Performed by: OBSTETRICS & GYNECOLOGY

## 2025-05-07 PROCEDURE — 1159F MED LIST DOCD IN RCRD: CPT | Performed by: OBSTETRICS & GYNECOLOGY

## 2025-05-07 PROCEDURE — 3017F COLORECTAL CA SCREEN DOC REV: CPT | Performed by: OBSTETRICS & GYNECOLOGY

## 2025-05-07 PROCEDURE — 1090F PRES/ABSN URINE INCON ASSESS: CPT | Performed by: OBSTETRICS & GYNECOLOGY

## 2025-05-07 PROCEDURE — G8427 DOCREV CUR MEDS BY ELIG CLIN: HCPCS | Performed by: OBSTETRICS & GYNECOLOGY

## 2025-05-07 PROCEDURE — 3078F DIAST BP <80 MM HG: CPT | Performed by: OBSTETRICS & GYNECOLOGY

## 2025-05-07 RX ORDER — POLYETHYLENE GLYCOL 3350 17 G/17G
17 POWDER, FOR SOLUTION ORAL DAILY
COMMUNITY

## 2025-05-07 RX ORDER — IBUPROFEN 800 MG/1
800 TABLET, FILM COATED ORAL 2 TIMES DAILY
COMMUNITY
Start: 2025-02-06

## 2025-05-07 NOTE — PROGRESS NOTES
Review of Systems   Constitutional:  Positive for fatigue. Negative for appetite change (Early satiety), chills, diaphoresis, fever and unexpected weight change.   HENT:   Negative for hearing loss, lump/mass, mouth sores, nosebleeds, sore throat, tinnitus, trouble swallowing and voice change.    Eyes:  Negative for eye problems and icterus.   Respiratory:  Negative for chest tightness, cough, hemoptysis, shortness of breath and wheezing.    Cardiovascular:  Negative for chest pain, leg swelling and palpitations.   Gastrointestinal:  Positive for diarrhea (keeping it loose due to recent history of bowel blockage). Negative for abdominal distention, abdominal pain, blood in stool, constipation, nausea, rectal pain and vomiting.   Endocrine: Negative for hot flashes.   Genitourinary:  Negative for bladder incontinence, difficulty urinating, dyspareunia, dysuria, frequency, hematuria, menstrual problem, nocturia, pelvic pain, vaginal bleeding and vaginal discharge.    Musculoskeletal:  Positive for back pain (lower back). Negative for arthralgias, flank pain, gait problem, myalgias, neck pain and neck stiffness.        Recent hip replacement   Skin:  Negative for itching, rash and wound.   Neurological:  Positive for numbness (left thumb). Negative for dizziness, extremity weakness, gait problem, headaches, light-headedness, seizures and speech difficulty.   Hematological:  Negative for adenopathy. Does not bruise/bleed easily.   Psychiatric/Behavioral:  Negative for confusion, decreased concentration, depression, sleep disturbance and suicidal ideas. The patient is not nervous/anxious.

## 2025-05-07 NOTE — PROGRESS NOTES
[facility] Gynecologic Oncology  [facility] [facility] [address]  [place] [address]    GYNECOLOGIC ONCOLOGY   FOLLOW UP NOTE     PATIENT NAME:     Keaton Wong  MRN:                       [id number]  DATE: 05/07/2025     REASON FOR VISIT:      Recurrent and Progressive Stage IA, grade 3, Mixed carcinoma of the UTERUS  On treatment follow up visit     HISTORY OF PRESENT ILLNESS:      Keaton Wong is a 71yo with a Stage IA, grade 3, Mixed carcinoma of the UTERUS (Diagnosed 2016). She has been treated with a DAWSON, BSO, LND, Omentectomy, Appendectomy, Partial small bowel resection (10/19/16), under the care of Dr. Abimbola Henderson and Dr. Jm Peres. She completed 6 cycles of Carboplatin + Taxol (C6: 03/18/17), under the care of Dr. Helga Clark. Recurrence established biopsy of LEFT abdominal wall mass (05/13/22). She completed a 2 month treatment with Arimidex, followed by radiation therapy to the left mid-lateral abdominal wall mass and left lower posterior chest wall / paraspinal mass (COMPLETION DATE: 06/24/22), under the care of Dr. Miguel Vidal, followed by 6 cycles of Carboplatin + Taxol (C6: 10/24/22), under the care of Dr. Helga Clark. She then restarted Arimidex (START DATE: November 2022 to the presents). She then demonstrated findings of recurrence in a a hyperavid 1cm left external iliac lymph node, based on PET/CT-scan (03/28/23). She completed treatment with 3000cGy EBRT to the LEFT pelvic LN (COMPLETION DATE: 05/17/23), by Dr. Miguel Vidal. She has completed 2 cycles of Lenvema + Keytruda (START DATE: 08/29/23. C2: Unknown. Records are not available for review). This regimen has been since discontinued due to adverse side effects to include, severe diarrhea, hyperthyroidism and hypertensive urgency. She has completed 4 cycles of Trastuzumab Deruxtecan (C4: 04/05/24).     She presents, unaccompanied, to the office today.      Held off on visits while recovering from hip replacement

## 2025-06-20 ENCOUNTER — TELEPHONE (OUTPATIENT)
Dept: GYNECOLOGIC ONCOLOGY | Age: 70
End: 2025-06-20

## 2025-06-20 NOTE — TELEPHONE ENCOUNTER
Called Dr. Bo's office and requested a list of chemo treatments with date, medication and doses.  Message to be sent to nurse for information.

## 2025-06-26 ENCOUNTER — OFFICE VISIT (OUTPATIENT)
Dept: GYNECOLOGIC ONCOLOGY | Age: 70
End: 2025-06-26
Payer: MEDICARE

## 2025-06-26 VITALS
TEMPERATURE: 98.4 F | WEIGHT: 138.4 LBS | HEART RATE: 89 BPM | SYSTOLIC BLOOD PRESSURE: 136 MMHG | OXYGEN SATURATION: 100 % | BODY MASS INDEX: 26.15 KG/M2 | DIASTOLIC BLOOD PRESSURE: 79 MMHG

## 2025-06-26 DIAGNOSIS — Z90.710 ACQUIRED ABSENCE OF BOTH CERVIX AND UTERUS: ICD-10-CM

## 2025-06-26 DIAGNOSIS — G47.9 SLEEP DISTURBANCE: ICD-10-CM

## 2025-06-26 DIAGNOSIS — R63.4 WEIGHT LOSS: ICD-10-CM

## 2025-06-26 DIAGNOSIS — G89.3 CANCER RELATED PAIN: ICD-10-CM

## 2025-06-26 DIAGNOSIS — Z90.722 ACQUIRED ABSENCE OF OVARIES, BILATERAL: ICD-10-CM

## 2025-06-26 DIAGNOSIS — C55 METASTASIS FROM CANCER OF UTERUS (HCC): Primary | ICD-10-CM

## 2025-06-26 DIAGNOSIS — K59.00 CONSTIPATION, UNSPECIFIED CONSTIPATION TYPE: ICD-10-CM

## 2025-06-26 DIAGNOSIS — C79.9 METASTASIS FROM CANCER OF UTERUS (HCC): Primary | ICD-10-CM

## 2025-06-26 DIAGNOSIS — E03.2 HYPOTHYROIDISM DUE TO MEDICATION: ICD-10-CM

## 2025-06-26 DIAGNOSIS — Z23 NEED FOR SHINGLES VACCINE: ICD-10-CM

## 2025-06-26 PROCEDURE — 3075F SYST BP GE 130 - 139MM HG: CPT | Performed by: OBSTETRICS & GYNECOLOGY

## 2025-06-26 PROCEDURE — 1123F ACP DISCUSS/DSCN MKR DOCD: CPT | Performed by: OBSTETRICS & GYNECOLOGY

## 2025-06-26 PROCEDURE — 99215 OFFICE O/P EST HI 40 MIN: CPT | Performed by: OBSTETRICS & GYNECOLOGY

## 2025-06-26 PROCEDURE — 3017F COLORECTAL CA SCREEN DOC REV: CPT | Performed by: OBSTETRICS & GYNECOLOGY

## 2025-06-26 PROCEDURE — 1090F PRES/ABSN URINE INCON ASSESS: CPT | Performed by: OBSTETRICS & GYNECOLOGY

## 2025-06-26 PROCEDURE — G8399 PT W/DXA RESULTS DOCUMENT: HCPCS | Performed by: OBSTETRICS & GYNECOLOGY

## 2025-06-26 PROCEDURE — 1036F TOBACCO NON-USER: CPT | Performed by: OBSTETRICS & GYNECOLOGY

## 2025-06-26 PROCEDURE — G8417 CALC BMI ABV UP PARAM F/U: HCPCS | Performed by: OBSTETRICS & GYNECOLOGY

## 2025-06-26 PROCEDURE — 3078F DIAST BP <80 MM HG: CPT | Performed by: OBSTETRICS & GYNECOLOGY

## 2025-06-26 PROCEDURE — G8427 DOCREV CUR MEDS BY ELIG CLIN: HCPCS | Performed by: OBSTETRICS & GYNECOLOGY

## 2025-06-26 PROCEDURE — 1159F MED LIST DOCD IN RCRD: CPT | Performed by: OBSTETRICS & GYNECOLOGY

## 2025-06-26 PROCEDURE — 1125F AMNT PAIN NOTED PAIN PRSNT: CPT | Performed by: OBSTETRICS & GYNECOLOGY

## 2025-06-26 RX ORDER — MELOXICAM 7.5 MG/1
7.5 TABLET ORAL EVERY MORNING
COMMUNITY
Start: 2025-03-03

## 2025-06-26 NOTE — PROGRESS NOTES
[facility] Gynecologic Oncology  [facility] [facility], [facility] [address]  [place] [address]    GYNECOLOGIC ONCOLOGY   FOLLOW UP NOTE     PATIENT NAME:     Keaton Wong  MRN:                       [id number]  DATE: 06/26/2025     REASON FOR VISIT:      Recurrent and Progressive Stage IA, grade 3, Mixed carcinoma of the UTERUS  On treatment follow up visit     HISTORY OF PRESENT ILLNESS:      Keaton Wong is a 69yo with a Stage IA, grade 3, Mixed carcinoma of the UTERUS (Diagnosed 2016). She has been treated with a DAWSON, BSO, LND, Omentectomy, Appendectomy, Partial small bowel resection (10/19/16), under the care of Dr. Abimbola Henderson and Dr. Jm Peres. She completed 6 cycles of Carboplatin + Taxol (C6: 03/18/17), under the care of Dr. Helga Clark. Recurrence established biopsy of LEFT abdominal wall mass (05/13/22). She completed a 2 month treatment with Arimidex, followed by radiation therapy to the left mid-lateral abdominal wall mass and left lower posterior chest wall / paraspinal mass (COMPLETION DATE: 06/24/22), under the care of Dr. Miguel Vidal, followed by 6 cycles of Carboplatin + Taxol (C6: 10/24/22), under the care of Dr. Helga Clark. She then restarted Arimidex (START DATE: November 2022 to the presents). She then demonstrated findings of recurrence in a a hyperavid 1cm left external iliac lymph node, based on PET/CT-scan (03/28/23). She completed treatment with 3000cGy EBRT to the LEFT pelvic LN (COMPLETION DATE: 05/17/23), by Dr. Miguel Vidal. She has completed 2 cycles of Lenvema + Keytruda (START DATE: 08/29/23. C2: Unknown. Records are not available for review). This regimen has been since discontinued due to adverse side effects to include, severe diarrhea, hyperthyroidism and hypertensive urgency. She has completed ? 6 ? cycles of Trastuzumab Deruxtecan (C6: 05/30/24). She presents, unaccompanied, to the office today.    Pain 2/10 LEFT knee    Excruciating pain in back    Intermittent

## 2025-06-26 NOTE — PROGRESS NOTES
Review of Systems   Constitutional:  Negative for appetite change, chills, diaphoresis, fatigue, fever and unexpected weight change.   HENT:   Negative for hearing loss, lump/mass, mouth sores, nosebleeds, sore throat, tinnitus, trouble swallowing and voice change.    Eyes:  Negative for eye problems and icterus.   Respiratory:  Negative for chest tightness, cough, hemoptysis, shortness of breath and wheezing.    Cardiovascular:  Negative for chest pain, leg swelling and palpitations.   Gastrointestinal:  Positive for abdominal pain (occasionally). Negative for abdominal distention, blood in stool, constipation, diarrhea, nausea, rectal pain and vomiting.   Endocrine: Negative for hot flashes.   Genitourinary:  Negative for bladder incontinence, difficulty urinating, dyspareunia, dysuria, frequency, hematuria, menstrual problem, nocturia, pelvic pain, vaginal bleeding and vaginal discharge.    Musculoskeletal:  Positive for back pain (T11 PET scan avidity). Negative for arthralgias, flank pain, gait problem, myalgias, neck pain and neck stiffness.   Skin:  Negative for itching, rash and wound.   Neurological:  Negative for dizziness, extremity weakness, gait problem, headaches, light-headedness, numbness, seizures and speech difficulty.   Hematological:  Negative for adenopathy. Does not bruise/bleed easily.   Psychiatric/Behavioral:  Negative for confusion, decreased concentration, depression, sleep disturbance and suicidal ideas. The patient is not nervous/anxious.

## 2025-07-24 ENCOUNTER — HOSPITAL ENCOUNTER (OUTPATIENT)
Dept: MAMMOGRAPHY | Age: 70
Discharge: HOME | End: 2025-07-26
Payer: MEDICARE

## 2025-07-24 DIAGNOSIS — Z12.31 ENCOUNTER FOR SCREENING MAMMOGRAM FOR MALIGNANT NEOPLASM OF BREAST: ICD-10-CM

## 2025-07-24 PROCEDURE — 77063 BREAST TOMOSYNTHESIS BI: CPT

## 2025-08-19 ENCOUNTER — HOSPITAL ENCOUNTER (OUTPATIENT)
Dept: ULTRASOUND IMAGING | Age: 70
Discharge: HOME | End: 2025-08-21
Payer: MEDICARE

## 2025-08-19 ENCOUNTER — HOSPITAL ENCOUNTER (OUTPATIENT)
Dept: MAMMOGRAPHY | Age: 70
Discharge: HOME | End: 2025-08-21
Payer: MEDICARE

## 2025-08-19 DIAGNOSIS — R92.8 ABNORMAL MAMMOGRAM OF LEFT BREAST: ICD-10-CM

## 2025-08-19 DIAGNOSIS — N64.89 BREAST ASYMMETRY: ICD-10-CM

## 2025-08-19 PROCEDURE — G0279 TOMOSYNTHESIS, MAMMO: HCPCS

## (undated) DEVICE — Device: Brand: DEFENDO VALVE AND CONNECTOR KIT

## (undated) DEVICE — CONNECTOR TBNG AUX H2O JET DISP FOR OLY 160/180 SER

## (undated) DEVICE — ERBE NESSY®PLATE 170 SPLIT; 168CM²; CABLE 3M: Brand: ERBE

## (undated) DEVICE — SOLUTION IV IRRIG 1000ML POUR BTL 2F7114

## (undated) DEVICE — EVACUATOR URO BLDR W/ ADPT UROVAC

## (undated) DEVICE — GARMENT,MEDLINE,DVT,INT,CALF,MED, GEN2: Brand: MEDLINE

## (undated) DEVICE — GLOVE ORANGE PI 7   MSG9070

## (undated) DEVICE — ADAPTER,CATHETER/SYRINGE/LUER,STERILE: Brand: MEDLINE

## (undated) DEVICE — SPONGE GZ W4XL4IN RAYON POLY FILL CVR W/ NONWOVEN FAB

## (undated) DEVICE — ELECTRODE PT RET AD L9FT HI MOIST COND ADH HYDRGEL CORDED

## (undated) DEVICE — SYRINGE MED 50ML LUERLOCK TIP

## (undated) DEVICE — JELLY,LUBE,STERILE,FLIP TOP,TUBE,2-OZ: Brand: MEDLINE

## (undated) DEVICE — DRAPE,REIN 53X77,STERILE: Brand: MEDLINE

## (undated) DEVICE — Z DISCONTINUED USE 2751540 TUBING IRRIG L10IN DISP PMP ENDOGATOR

## (undated) DEVICE — TUBING INSUFFLATION CAP W/ EXT CARBON DIOX ENDO SMARTCAP

## (undated) DEVICE — 4-PORT MANIFOLD: Brand: NEPTUNE 2

## (undated) DEVICE — BASIN EMSIS 700ML GRAPHITE PLAS KID SHP GRAD

## (undated) DEVICE — TUBING, SUCTION, 3/16" X 10', STRAIGHT: Brand: MEDLINE

## (undated) DEVICE — POLYP TRAP: Brand: TRAPEASE®

## (undated) DEVICE — DRAINBAG,ANTI-REFLUX TOWER,L/F,2000ML,LL: Brand: MEDLINE

## (undated) DEVICE — GOWN,AURORA,NONRNF,XL,30/CS: Brand: MEDLINE

## (undated) DEVICE — HF-RESECTION ELECTRODE PLASMALOOP LOOP, MEDIUM, 24 FR., 12°/16°, ESG TURIS: Brand: OLYMPUS

## (undated) DEVICE — PACK PROCEDURE SURG CYSTO SVMMC LF

## (undated) DEVICE — SNARE ENDOSCP POLYP MED STD AD 2.4X27X240 CM 2.8 MM OVL SENS

## (undated) DEVICE — FLUFF UNDERPAD,MODERATE: Brand: WINGS

## (undated) DEVICE — Z INACTIVE USE 2635503 SOLUTION IRRIG 3000ML ST H2O USP UROMATIC PLAS CONT